# Patient Record
Sex: MALE | Race: WHITE | Employment: OTHER | URBAN - NONMETROPOLITAN AREA
[De-identification: names, ages, dates, MRNs, and addresses within clinical notes are randomized per-mention and may not be internally consistent; named-entity substitution may affect disease eponyms.]

---

## 2017-12-05 LAB — PAIN MANAGEMENT DRUG PANEL INTERP, URINE: NORMAL

## 2018-08-01 ENCOUNTER — APPOINTMENT (OUTPATIENT)
Dept: MRI IMAGING | Age: 63
DRG: 518 | End: 2018-08-01
Payer: MEDICARE

## 2018-08-01 ENCOUNTER — HOSPITAL ENCOUNTER (INPATIENT)
Age: 63
LOS: 21 days | Discharge: NURSING, REHAB OR LONG-TERM CARE | DRG: 518 | End: 2018-08-22
Attending: EMERGENCY MEDICINE | Admitting: INTERNAL MEDICINE
Payer: MEDICARE

## 2018-08-01 ENCOUNTER — HOSPITAL ENCOUNTER (OUTPATIENT)
Dept: CT IMAGING | Age: 63
Discharge: HOME | End: 2018-08-01
Payer: MEDICARE

## 2018-08-01 ENCOUNTER — HOSPITAL ENCOUNTER (OUTPATIENT)
Dept: MRI IMAGING | Age: 63
Discharge: HOME | End: 2018-08-01
Payer: MEDICARE

## 2018-08-01 ENCOUNTER — HOSPITAL ENCOUNTER (OUTPATIENT)
Dept: NUCLEAR MEDICINE | Age: 63
Discharge: HOME | End: 2018-08-01
Payer: MEDICARE

## 2018-08-01 ENCOUNTER — APPOINTMENT (OUTPATIENT)
Dept: CT IMAGING | Age: 63
DRG: 518 | End: 2018-08-01
Payer: MEDICARE

## 2018-08-01 DIAGNOSIS — G95.20 CORD COMPRESSION (HCC): ICD-10-CM

## 2018-08-01 DIAGNOSIS — Z00.6 EXAMINATION FOR NORMAL COMPARISON FOR CLINICAL RESEARCH: ICD-10-CM

## 2018-08-01 DIAGNOSIS — G95.9 ACUTE MYELOPATHY (HCC): Primary | ICD-10-CM

## 2018-08-01 DIAGNOSIS — C61 PROSTATE CANCER, PRIMARY, WITH METASTASIS FROM PROSTATE TO OTHER SITE (HCC): ICD-10-CM

## 2018-08-01 LAB
ALBUMIN SERPL-MCNC: 4.1 G/DL (ref 3.5–5.1)
ALP BLD-CCNC: 205 U/L (ref 38–126)
ALT SERPL-CCNC: 31 U/L (ref 11–66)
ANION GAP SERPL CALCULATED.3IONS-SCNC: 14 MEQ/L (ref 8–16)
APTT: 29 SECONDS (ref 22–38)
AST SERPL-CCNC: 45 U/L (ref 5–40)
BASOPHILS # BLD: 0.9 %
BASOPHILS ABSOLUTE: 0.1 THOU/MM3 (ref 0–0.1)
BILIRUB SERPL-MCNC: 0.3 MG/DL (ref 0.3–1.2)
BUN BLDV-MCNC: 11 MG/DL (ref 7–22)
CALCIUM SERPL-MCNC: 9.4 MG/DL (ref 8.5–10.5)
CHLORIDE BLD-SCNC: 101 MEQ/L (ref 98–111)
CO2: 28 MEQ/L (ref 23–33)
CREAT SERPL-MCNC: 0.8 MG/DL (ref 0.4–1.2)
EOSINOPHIL # BLD: 3.5 %
EOSINOPHILS ABSOLUTE: 0.3 THOU/MM3 (ref 0–0.4)
ERYTHROCYTE [DISTWIDTH] IN BLOOD BY AUTOMATED COUNT: 13.4 % (ref 11.5–14.5)
ERYTHROCYTE [DISTWIDTH] IN BLOOD BY AUTOMATED COUNT: 46.5 FL (ref 35–45)
GFR SERPL CREATININE-BSD FRML MDRD: > 90 ML/MIN/1.73M2
GLUCOSE BLD-MCNC: 120 MG/DL (ref 70–108)
HCT VFR BLD CALC: 44.8 % (ref 42–52)
HEMOGLOBIN: 14.2 GM/DL (ref 14–18)
IMMATURE GRANS (ABS): 0.03 THOU/MM3 (ref 0–0.07)
IMMATURE GRANULOCYTES: 0.4 %
INR BLD: 0.96 (ref 0.85–1.13)
LYMPHOCYTES # BLD: 24.3 %
LYMPHOCYTES ABSOLUTE: 1.8 THOU/MM3 (ref 1–4.8)
MCH RBC QN AUTO: 29.9 PG (ref 26–33)
MCHC RBC AUTO-ENTMCNC: 31.7 GM/DL (ref 32.2–35.5)
MCV RBC AUTO: 94.3 FL (ref 80–94)
MONOCYTES # BLD: 8.8 %
MONOCYTES ABSOLUTE: 0.7 THOU/MM3 (ref 0.4–1.3)
NUCLEATED RED BLOOD CELLS: 0 /100 WBC
OSMOLALITY CALCULATION: 285.6 MOSMOL/KG (ref 275–300)
PLATELET # BLD: 298 THOU/MM3 (ref 130–400)
PMV BLD AUTO: 9.8 FL (ref 9.4–12.4)
POTASSIUM SERPL-SCNC: 3.9 MEQ/L (ref 3.5–5.2)
PROSTATE SPECIFIC ANTIGEN: 289.9 NG/ML (ref 0–1)
RBC # BLD: 4.75 MILL/MM3 (ref 4.7–6.1)
SEG NEUTROPHILS: 62.1 %
SEGMENTED NEUTROPHILS ABSOLUTE COUNT: 4.6 THOU/MM3 (ref 1.8–7.7)
SODIUM BLD-SCNC: 143 MEQ/L (ref 135–145)
TOTAL CK: 45 U/L (ref 55–170)
TOTAL PROTEIN: 7.5 G/DL (ref 6.1–8)
WBC # BLD: 7.4 THOU/MM3 (ref 4.8–10.8)

## 2018-08-01 PROCEDURE — 74178 CT ABD&PLV WO CNTR FLWD CNTR: CPT

## 2018-08-01 PROCEDURE — 3209999900 NM COMPARISON OF OUTSIDE FILMS

## 2018-08-01 PROCEDURE — 96375 TX/PRO/DX INJ NEW DRUG ADDON: CPT

## 2018-08-01 PROCEDURE — 3209999900 MRI COMPARISON OF OUTSIDE FILMS

## 2018-08-01 PROCEDURE — 36415 COLL VENOUS BLD VENIPUNCTURE: CPT

## 2018-08-01 PROCEDURE — 70553 MRI BRAIN STEM W/O & W/DYE: CPT

## 2018-08-01 PROCEDURE — 82550 ASSAY OF CK (CPK): CPT

## 2018-08-01 PROCEDURE — 6360000002 HC RX W HCPCS

## 2018-08-01 PROCEDURE — 2060000000 HC ICU INTERMEDIATE R&B

## 2018-08-01 PROCEDURE — 3209999900 CT COMPARISON OF OUTSIDE FILMS

## 2018-08-01 PROCEDURE — 6370000000 HC RX 637 (ALT 250 FOR IP): Performed by: INTERNAL MEDICINE

## 2018-08-01 PROCEDURE — 85730 THROMBOPLASTIN TIME PARTIAL: CPT

## 2018-08-01 PROCEDURE — 85025 COMPLETE CBC W/AUTO DIFF WBC: CPT

## 2018-08-01 PROCEDURE — 84484 ASSAY OF TROPONIN QUANT: CPT

## 2018-08-01 PROCEDURE — 72158 MRI LUMBAR SPINE W/O & W/DYE: CPT

## 2018-08-01 PROCEDURE — 71270 CT THORAX DX C-/C+: CPT

## 2018-08-01 PROCEDURE — 99231 SBSQ HOSP IP/OBS SF/LOW 25: CPT | Performed by: NEUROLOGICAL SURGERY

## 2018-08-01 PROCEDURE — 6360000004 HC RX CONTRAST MEDICATION: Performed by: EMERGENCY MEDICINE

## 2018-08-01 PROCEDURE — 84153 ASSAY OF PSA TOTAL: CPT

## 2018-08-01 PROCEDURE — 72156 MRI NECK SPINE W/O & W/DYE: CPT

## 2018-08-01 PROCEDURE — 72157 MRI CHEST SPINE W/O & W/DYE: CPT

## 2018-08-01 PROCEDURE — A6250 SKIN SEAL PROTECT MOISTURIZR: HCPCS

## 2018-08-01 PROCEDURE — 6360000004 HC RX CONTRAST MEDICATION: Performed by: INTERNAL MEDICINE

## 2018-08-01 PROCEDURE — 2709999900 HC NON-CHARGEABLE SUPPLY

## 2018-08-01 PROCEDURE — 96374 THER/PROPH/DIAG INJ IV PUSH: CPT

## 2018-08-01 PROCEDURE — 2580000003 HC RX 258: Performed by: NEUROLOGICAL SURGERY

## 2018-08-01 PROCEDURE — 99285 EMERGENCY DEPT VISIT HI MDM: CPT

## 2018-08-01 PROCEDURE — 6360000002 HC RX W HCPCS: Performed by: NEUROLOGICAL SURGERY

## 2018-08-01 PROCEDURE — 85610 PROTHROMBIN TIME: CPT

## 2018-08-01 PROCEDURE — 6360000002 HC RX W HCPCS: Performed by: EMERGENCY MEDICINE

## 2018-08-01 PROCEDURE — 80053 COMPREHEN METABOLIC PANEL: CPT

## 2018-08-01 PROCEDURE — A9579 GAD-BASE MR CONTRAST NOS,1ML: HCPCS | Performed by: EMERGENCY MEDICINE

## 2018-08-01 RX ORDER — DULOXETIN HYDROCHLORIDE 20 MG/1
60 CAPSULE, DELAYED RELEASE ORAL DAILY
Status: ON HOLD | COMMUNITY
End: 2018-08-07

## 2018-08-01 RX ORDER — DEXAMETHASONE SODIUM PHOSPHATE 4 MG/ML
10 INJECTION, SOLUTION INTRA-ARTICULAR; INTRALESIONAL; INTRAMUSCULAR; INTRAVENOUS; SOFT TISSUE EVERY 6 HOURS
Status: DISCONTINUED | OUTPATIENT
Start: 2018-08-02 | End: 2018-08-04

## 2018-08-01 RX ORDER — OXYCODONE HYDROCHLORIDE AND ACETAMINOPHEN 5; 325 MG/1; MG/1
1-2 TABLET ORAL EVERY 8 HOURS PRN
Status: ON HOLD | COMMUNITY
End: 2018-08-21 | Stop reason: HOSPADM

## 2018-08-01 RX ORDER — DEXAMETHASONE SODIUM PHOSPHATE 4 MG/ML
10 INJECTION, SOLUTION INTRA-ARTICULAR; INTRALESIONAL; INTRAMUSCULAR; INTRAVENOUS; SOFT TISSUE ONCE
Status: COMPLETED | OUTPATIENT
Start: 2018-08-01 | End: 2018-08-01

## 2018-08-01 RX ORDER — HYDROXYZINE PAMOATE 25 MG/1
25 CAPSULE ORAL NIGHTLY
Status: ON HOLD | COMMUNITY
End: 2018-08-21 | Stop reason: HOSPADM

## 2018-08-01 RX ORDER — GABAPENTIN 600 MG/1
1200 TABLET ORAL 2 TIMES DAILY
Status: ON HOLD | COMMUNITY
End: 2018-08-21 | Stop reason: HOSPADM

## 2018-08-01 RX ORDER — HYDROCODONE BITARTRATE AND ACETAMINOPHEN 10; 325 MG/1; MG/1
1 TABLET ORAL EVERY 6 HOURS PRN
Status: DISCONTINUED | OUTPATIENT
Start: 2018-08-01 | End: 2018-08-16

## 2018-08-01 RX ORDER — MORPHINE SULFATE 2 MG/ML
2 INJECTION, SOLUTION INTRAMUSCULAR; INTRAVENOUS ONCE
Status: COMPLETED | OUTPATIENT
Start: 2018-08-01 | End: 2018-08-01

## 2018-08-01 RX ORDER — FLUTICASONE FUROATE AND VILANTEROL 200; 25 UG/1; UG/1
1 POWDER RESPIRATORY (INHALATION) DAILY
Status: DISCONTINUED | OUTPATIENT
Start: 2018-08-02 | End: 2018-08-18 | Stop reason: ALTCHOICE

## 2018-08-01 RX ORDER — METHYLPREDNISOLONE SODIUM SUCCINATE 125 MG/2ML
125 INJECTION, POWDER, LYOPHILIZED, FOR SOLUTION INTRAMUSCULAR; INTRAVENOUS ONCE
Status: COMPLETED | OUTPATIENT
Start: 2018-08-01 | End: 2018-08-01

## 2018-08-01 RX ORDER — PANTOPRAZOLE SODIUM 40 MG/1
40 TABLET, DELAYED RELEASE ORAL
Status: DISCONTINUED | OUTPATIENT
Start: 2018-08-02 | End: 2018-08-04

## 2018-08-01 RX ORDER — MORPHINE SULFATE 2 MG/ML
2 INJECTION, SOLUTION INTRAMUSCULAR; INTRAVENOUS EVERY 4 HOURS PRN
Status: DISCONTINUED | OUTPATIENT
Start: 2018-08-01 | End: 2018-08-04 | Stop reason: SDUPTHER

## 2018-08-01 RX ORDER — MORPHINE SULFATE 2 MG/ML
INJECTION, SOLUTION INTRAMUSCULAR; INTRAVENOUS
Status: COMPLETED
Start: 2018-08-01 | End: 2018-08-01

## 2018-08-01 RX ORDER — DULOXETIN HYDROCHLORIDE 20 MG/1
40 CAPSULE, DELAYED RELEASE ORAL DAILY
Status: DISCONTINUED | OUTPATIENT
Start: 2018-08-02 | End: 2018-08-02

## 2018-08-01 RX ORDER — LEVOTHYROXINE SODIUM 0.05 MG/1
50 TABLET ORAL DAILY
Status: DISCONTINUED | OUTPATIENT
Start: 2018-08-02 | End: 2018-08-18 | Stop reason: ALTCHOICE

## 2018-08-01 RX ORDER — ALBUTEROL SULFATE 2.5 MG/3ML
2.5 SOLUTION RESPIRATORY (INHALATION) 4 TIMES DAILY
Status: ON HOLD | COMMUNITY
End: 2018-08-21 | Stop reason: HOSPADM

## 2018-08-01 RX ORDER — GABAPENTIN 600 MG/1
600 TABLET ORAL 3 TIMES DAILY
Status: DISCONTINUED | OUTPATIENT
Start: 2018-08-01 | End: 2018-08-07

## 2018-08-01 RX ORDER — OMEPRAZOLE 20 MG/1
40 CAPSULE, DELAYED RELEASE ORAL DAILY
Status: ON HOLD | COMMUNITY
End: 2018-08-07

## 2018-08-01 RX ORDER — ROPINIROLE 0.5 MG/1
0.5 TABLET, FILM COATED ORAL 2 TIMES DAILY
Status: ON HOLD | COMMUNITY
End: 2018-08-07 | Stop reason: CLARIF

## 2018-08-01 RX ORDER — LEVOTHYROXINE SODIUM 0.05 MG/1
50 TABLET ORAL DAILY
Status: ON HOLD | COMMUNITY
End: 2018-08-21 | Stop reason: HOSPADM

## 2018-08-01 RX ORDER — HYDROCODONE BITARTRATE AND ACETAMINOPHEN 10; 325 MG/1; MG/1
1 TABLET ORAL EVERY 6 HOURS PRN
Status: ON HOLD | COMMUNITY
End: 2018-08-01 | Stop reason: CLARIF

## 2018-08-01 RX ORDER — FLUTICASONE FUROATE AND VILANTEROL 200; 25 UG/1; UG/1
1 POWDER RESPIRATORY (INHALATION) DAILY
Status: ON HOLD | COMMUNITY
End: 2018-08-21 | Stop reason: HOSPADM

## 2018-08-01 RX ORDER — INSULIN GLARGINE 100 [IU]/ML
10 INJECTION, SOLUTION SUBCUTANEOUS DAILY
Status: DISCONTINUED | OUTPATIENT
Start: 2018-08-02 | End: 2018-08-04

## 2018-08-01 RX ADMIN — DEXAMETHASONE SODIUM PHOSPHATE 40 MG: 4 INJECTION, SOLUTION INTRA-ARTICULAR; INTRALESIONAL; INTRAMUSCULAR; INTRAVENOUS; SOFT TISSUE at 23:36

## 2018-08-01 RX ADMIN — DEXAMETHASONE SODIUM PHOSPHATE 10 MG: 4 INJECTION, SOLUTION INTRA-ARTICULAR; INTRALESIONAL; INTRAMUSCULAR; INTRAVENOUS; SOFT TISSUE at 18:37

## 2018-08-01 RX ADMIN — MORPHINE SULFATE 2 MG: 2 INJECTION, SOLUTION INTRAMUSCULAR; INTRAVENOUS at 18:36

## 2018-08-01 RX ADMIN — IOPAMIDOL 85 ML: 755 INJECTION, SOLUTION INTRAVENOUS at 21:24

## 2018-08-01 RX ADMIN — GADOTERIDOL 20 ML: 279.3 INJECTION, SOLUTION INTRAVENOUS at 21:30

## 2018-08-01 RX ADMIN — HYDROCODONE BITARTRATE AND ACETAMINOPHEN 1 TABLET: 10; 325 TABLET ORAL at 23:50

## 2018-08-01 RX ADMIN — METHYLPREDNISOLONE SODIUM SUCCINATE 125 MG: 125 INJECTION, POWDER, FOR SOLUTION INTRAMUSCULAR; INTRAVENOUS at 17:39

## 2018-08-01 ASSESSMENT — PAIN DESCRIPTION - DESCRIPTORS
DESCRIPTORS: SHARP
DESCRIPTORS: SHARP

## 2018-08-01 ASSESSMENT — ENCOUNTER SYMPTOMS
ABDOMINAL PAIN: 0
COUGH: 0
SHORTNESS OF BREATH: 0
NAUSEA: 0
WHEEZING: 0
BACK PAIN: 1
DIARRHEA: 0
VOMITING: 0

## 2018-08-01 ASSESSMENT — PAIN DESCRIPTION - ORIENTATION
ORIENTATION: MID
ORIENTATION: MID

## 2018-08-01 ASSESSMENT — PAIN SCALES - GENERAL
PAINLEVEL_OUTOF10: 8
PAINLEVEL_OUTOF10: 8
PAINLEVEL_OUTOF10: 7
PAINLEVEL_OUTOF10: 7

## 2018-08-01 ASSESSMENT — PAIN DESCRIPTION - FREQUENCY
FREQUENCY: CONTINUOUS
FREQUENCY: CONTINUOUS

## 2018-08-01 ASSESSMENT — PAIN DESCRIPTION - PAIN TYPE
TYPE: ACUTE PAIN
TYPE: ACUTE PAIN

## 2018-08-01 ASSESSMENT — PAIN DESCRIPTION - LOCATION
LOCATION: BACK
LOCATION: BACK

## 2018-08-01 NOTE — ED PROVIDER NOTES
Conjunctivae and EOM are normal.   Neck: Normal range of motion. Neck supple. Cardiovascular: Normal rate, regular rhythm, normal heart sounds, intact distal pulses and normal pulses. Exam reveals no gallop and no friction rub. No murmur heard. Pulmonary/Chest: Effort normal and breath sounds normal. No respiratory distress. He has no decreased breath sounds. He has no wheezes. He has no rhonchi. He has no rales. Abdominal: Soft. Bowel sounds are normal. He exhibits no distension. There is no tenderness. There is no rebound, no guarding and no CVA tenderness. Patient had decreased sensation in mid-abdomen corresponding to the T7 and T8    Genitourinary: Rectum normal.   Genitourinary Comments: Patient has decreased sensation in genitals    Musculoskeletal: Normal range of motion. He exhibits no edema. Patient has symmetric active muscle stress bilaterally of lower extremities with a muscle grade 2 (not able to against gravity). Patient had right sided saddle sensation intact but a decreased left sided saddle sensation. Neurological: He is alert and oriented to person, place, and time. He displays abnormal reflex. He exhibits abnormal muscle tone. Coordination normal.   Patient has symmetric active muscle stress bilaterally of lower extremities with a muscle grade of 1. Patient had right sided saddle sensation intact but a decreased left sided saddle sensation. Decreased sensation started from T7 dermatome at mid upper abdomen. Skin: Skin is warm and dry. No rash noted. He is not diaphoretic. Nursing note and vitals reviewed.         DIFFERENTIAL DIAGNOSIS:   Metastatic prostate cancer, acute myelopathy, paraplegia, acute pathology     DIAGNOSTIC RESULTS     EKG: All EKG's are interpreted by the Emergency Department Physician who either signs or Co-signs this chart in the absence of a cardiologist.  None    RADIOLOGY: non-plain film images(s) such as CT, Ultrasound and MRI are read by the radiologist.  Nm Comparison Of Outside Films    Result Date: 8/1/2018  Radiology exam is complete. No Radiologist dictation. Please follow up with ordering provider. Nm Comparison Of Outside Films    Result Date: 8/1/2018  Radiology exam is complete. No Radiologist dictation. Please follow up with ordering provider. Ct Comparison Of Outside Films    Result Date: 8/1/2018  Radiology exam is complete. No Radiologist dictation. Please follow up with ordering provider. Ct Comparison Of Outside Films    Result Date: 8/1/2018  Radiology exam is complete. No Radiologist dictation. Please follow up with ordering provider.            CT ABDOMEN PELVIS W WO CONTRAST Additional Contrast? Radiologist Recommendation    (Results Pending)   CT CHEST W WO CONTRAST    (Results Pending)   MRI BRAIN W WO CONTRAST    (Results Pending)   MRI CERVICAL SPINE W WO CONTRAST    (Results Pending)   MRI LUMBAR SPINE W WO CONTRAST    (Results Pending)   MRI THORACIC SPINE W WO CONTRAST    (Results Pending)     [] Visualized and interpreted by me   [] Radiologist's Wet Read Report Reviewed   [] Discussed with Radiologist.    Leigha Lozano:   Results for orders placed or performed during the hospital encounter of 08/01/18   CBC Auto Differential   Result Value Ref Range    WBC 7.4 4.8 - 10.8 thou/mm3    RBC 4.75 4.70 - 6.10 mill/mm3    Hemoglobin 14.2 14.0 - 18.0 gm/dl    Hematocrit 44.8 42.0 - 52.0 %    MCV 94.3 (H) 80.0 - 94.0 fL    MCH 29.9 26.0 - 33.0 pg    MCHC 31.7 (L) 32.2 - 35.5 gm/dl    RDW-CV 13.4 11.5 - 14.5 %    RDW-SD 46.5 (H) 35.0 - 45.0 fL    Platelets 144 210 - 565 thou/mm3    MPV 9.8 9.4 - 12.4 fL    Seg Neutrophils 62.1 %    Lymphocytes 24.3 %    Monocytes 8.8 %    Eosinophils 3.5 %    Basophils 0.9 %    Immature Granulocytes 0.4 %    Segs Absolute 4.6 1.8 - 7.7 thou/mm3    Lymphocytes # 1.8 1.0 - 4.8 thou/mm3    Monocytes # 0.7 0.4 - 1.3 thou/mm3    Eosinophils # 0.3 0.0 - 0.4 thou/mm3    Basophils # 0.1 0.0 - 0.1 thou/mm3

## 2018-08-02 ENCOUNTER — APPOINTMENT (OUTPATIENT)
Dept: GENERAL RADIOLOGY | Age: 63
DRG: 518 | End: 2018-08-02
Payer: MEDICARE

## 2018-08-02 ENCOUNTER — ANESTHESIA (OUTPATIENT)
Dept: OPERATING ROOM | Age: 63
DRG: 518 | End: 2018-08-02
Payer: MEDICARE

## 2018-08-02 ENCOUNTER — APPOINTMENT (OUTPATIENT)
Dept: CT IMAGING | Age: 63
DRG: 518 | End: 2018-08-02
Payer: MEDICARE

## 2018-08-02 ENCOUNTER — ANESTHESIA EVENT (OUTPATIENT)
Dept: OPERATING ROOM | Age: 63
DRG: 518 | End: 2018-08-02
Payer: MEDICARE

## 2018-08-02 LAB
ABO: NORMAL
ALBUMIN SERPL-MCNC: 4 G/DL (ref 3.5–5.1)
ALP BLD-CCNC: 197 U/L (ref 38–126)
ALT SERPL-CCNC: 27 U/L (ref 11–66)
ANION GAP SERPL CALCULATED.3IONS-SCNC: 12 MEQ/L (ref 8–16)
ANTIBODY SCREEN: NORMAL
AST SERPL-CCNC: 33 U/L (ref 5–40)
BASOPHILS # BLD: 0.1 %
BASOPHILS ABSOLUTE: 0 THOU/MM3 (ref 0–0.1)
BILIRUB SERPL-MCNC: 0.4 MG/DL (ref 0.3–1.2)
BUN BLDV-MCNC: 12 MG/DL (ref 7–22)
CALCIUM SERPL-MCNC: 9.7 MG/DL (ref 8.5–10.5)
CHLORIDE BLD-SCNC: 102 MEQ/L (ref 98–111)
CHOLESTEROL, TOTAL: 219 MG/DL (ref 100–199)
CO2: 25 MEQ/L (ref 23–33)
CREAT SERPL-MCNC: 0.6 MG/DL (ref 0.4–1.2)
EKG ATRIAL RATE: 94 BPM
EKG P AXIS: 35 DEGREES
EKG P-R INTERVAL: 120 MS
EKG Q-T INTERVAL: 364 MS
EKG QRS DURATION: 92 MS
EKG QTC CALCULATION (BAZETT): 455 MS
EKG R AXIS: 34 DEGREES
EKG T AXIS: 55 DEGREES
EKG VENTRICULAR RATE: 94 BPM
EOSINOPHIL # BLD: 0 %
EOSINOPHILS ABSOLUTE: 0 THOU/MM3 (ref 0–0.4)
ERYTHROCYTE [DISTWIDTH] IN BLOOD BY AUTOMATED COUNT: 13.4 % (ref 11.5–14.5)
ERYTHROCYTE [DISTWIDTH] IN BLOOD BY AUTOMATED COUNT: 45.8 FL (ref 35–45)
GFR SERPL CREATININE-BSD FRML MDRD: > 90 ML/MIN/1.73M2
GLUCOSE BLD-MCNC: 139 MG/DL (ref 70–108)
GLUCOSE BLD-MCNC: 163 MG/DL (ref 70–108)
GLUCOSE BLD-MCNC: 171 MG/DL (ref 70–108)
GLUCOSE BLD-MCNC: 179 MG/DL (ref 70–108)
GLUCOSE BLD-MCNC: 200 MG/DL (ref 70–108)
HCT VFR BLD CALC: 45 % (ref 42–52)
HDLC SERPL-MCNC: 61 MG/DL
HEMOGLOBIN: 14.7 GM/DL (ref 14–18)
IMMATURE GRANS (ABS): 0.08 THOU/MM3 (ref 0–0.07)
IMMATURE GRANULOCYTES: 0.7 %
LDL CHOLESTEROL CALCULATED: 144 MG/DL
LYMPHOCYTES # BLD: 8.2 %
LYMPHOCYTES ABSOLUTE: 1 THOU/MM3 (ref 1–4.8)
MCH RBC QN AUTO: 30.1 PG (ref 26–33)
MCHC RBC AUTO-ENTMCNC: 32.7 GM/DL (ref 32.2–35.5)
MCV RBC AUTO: 92.2 FL (ref 80–94)
MONOCYTES # BLD: 0.5 %
MONOCYTES ABSOLUTE: 0.1 THOU/MM3 (ref 0.4–1.3)
NUCLEATED RED BLOOD CELLS: 0 /100 WBC
OSMOLALITY CALCULATION: 281.8 MOSMOL/KG (ref 275–300)
PLATELET # BLD: 309 THOU/MM3 (ref 130–400)
PMV BLD AUTO: 10 FL (ref 9.4–12.4)
POTASSIUM SERPL-SCNC: 4.2 MEQ/L (ref 3.5–5.2)
RBC # BLD: 4.88 MILL/MM3 (ref 4.7–6.1)
RH FACTOR: NORMAL
SEG NEUTROPHILS: 90.5 %
SEGMENTED NEUTROPHILS ABSOLUTE COUNT: 10.8 THOU/MM3 (ref 1.8–7.7)
SODIUM BLD-SCNC: 139 MEQ/L (ref 135–145)
TOTAL CK: 39 U/L (ref 55–170)
TOTAL CK: 41 U/L (ref 55–170)
TOTAL PROTEIN: 7.4 G/DL (ref 6.1–8)
TRIGL SERPL-MCNC: 71 MG/DL (ref 0–199)
TROPONIN T: < 0.01 NG/ML
TROPONIN T: < 0.01 NG/ML
TSH SERPL DL<=0.05 MIU/L-ACNC: 0.83 UIU/ML (ref 0.4–4.2)
WBC # BLD: 11.9 THOU/MM3 (ref 4.8–10.8)

## 2018-08-02 PROCEDURE — 72125 CT NECK SPINE W/O DYE: CPT

## 2018-08-02 PROCEDURE — 93005 ELECTROCARDIOGRAM TRACING: CPT | Performed by: INTERNAL MEDICINE

## 2018-08-02 PROCEDURE — 2709999900 HC NON-CHARGEABLE SUPPLY

## 2018-08-02 PROCEDURE — 2709999900 HC NON-CHARGEABLE SUPPLY: Performed by: NEUROLOGICAL SURGERY

## 2018-08-02 PROCEDURE — 86923 COMPATIBILITY TEST ELECTRIC: CPT

## 2018-08-02 PROCEDURE — 36415 COLL VENOUS BLD VENIPUNCTURE: CPT

## 2018-08-02 PROCEDURE — 7100000001 HC PACU RECOVERY - ADDTL 15 MIN: Performed by: NEUROLOGICAL SURGERY

## 2018-08-02 PROCEDURE — 72128 CT CHEST SPINE W/O DYE: CPT

## 2018-08-02 PROCEDURE — 95940 IONM IN OPERATNG ROOM 15 MIN: CPT | Performed by: NEUROLOGICAL SURGERY

## 2018-08-02 PROCEDURE — 84443 ASSAY THYROID STIM HORMONE: CPT

## 2018-08-02 PROCEDURE — 2060000000 HC ICU INTERMEDIATE R&B

## 2018-08-02 PROCEDURE — 85025 COMPLETE CBC W/AUTO DIFF WBC: CPT

## 2018-08-02 PROCEDURE — 2780000010 HC IMPLANT OTHER: Performed by: NEUROLOGICAL SURGERY

## 2018-08-02 PROCEDURE — 86900 BLOOD TYPING SEROLOGIC ABO: CPT

## 2018-08-02 PROCEDURE — 82948 REAGENT STRIP/BLOOD GLUCOSE: CPT

## 2018-08-02 PROCEDURE — 6370000000 HC RX 637 (ALT 250 FOR IP): Performed by: INTERNAL MEDICINE

## 2018-08-02 PROCEDURE — 6360000002 HC RX W HCPCS: Performed by: INTERNAL MEDICINE

## 2018-08-02 PROCEDURE — 6370000000 HC RX 637 (ALT 250 FOR IP): Performed by: PHYSICIAN ASSISTANT

## 2018-08-02 PROCEDURE — P9016 RBC LEUKOCYTES REDUCED: HCPCS

## 2018-08-02 PROCEDURE — 3600000015 HC SURGERY LEVEL 5 ADDTL 15MIN: Performed by: NEUROLOGICAL SURGERY

## 2018-08-02 PROCEDURE — 82550 ASSAY OF CK (CPK): CPT

## 2018-08-02 PROCEDURE — C1713 ANCHOR/SCREW BN/BN,TIS/BN: HCPCS | Performed by: NEUROLOGICAL SURGERY

## 2018-08-02 PROCEDURE — 84484 ASSAY OF TROPONIN QUANT: CPT

## 2018-08-02 PROCEDURE — 00B20ZZ EXCISION OF DURA MATER, OPEN APPROACH: ICD-10-PCS | Performed by: NEUROLOGICAL SURGERY

## 2018-08-02 PROCEDURE — 7100000000 HC PACU RECOVERY - FIRST 15 MIN: Performed by: NEUROLOGICAL SURGERY

## 2018-08-02 PROCEDURE — 00NX0ZZ RELEASE THORACIC SPINAL CORD, OPEN APPROACH: ICD-10-PCS | Performed by: NEUROLOGICAL SURGERY

## 2018-08-02 PROCEDURE — 3700000001 HC ADD 15 MINUTES (ANESTHESIA): Performed by: NEUROLOGICAL SURGERY

## 2018-08-02 PROCEDURE — 86901 BLOOD TYPING SEROLOGIC RH(D): CPT

## 2018-08-02 PROCEDURE — 0RB90ZZ EXCISION OF THORACIC VERTEBRAL DISC, OPEN APPROACH: ICD-10-PCS | Performed by: NEUROLOGICAL SURGERY

## 2018-08-02 PROCEDURE — 80053 COMPREHEN METABOLIC PANEL: CPT

## 2018-08-02 PROCEDURE — 86850 RBC ANTIBODY SCREEN: CPT

## 2018-08-02 PROCEDURE — 3600000005 HC SURGERY LEVEL 5 BASE: Performed by: NEUROLOGICAL SURGERY

## 2018-08-02 PROCEDURE — 99222 1ST HOSP IP/OBS MODERATE 55: CPT | Performed by: PHYSICIAN ASSISTANT

## 2018-08-02 PROCEDURE — 2720000010 HC SURG SUPPLY STERILE: Performed by: NEUROLOGICAL SURGERY

## 2018-08-02 PROCEDURE — 3700000000 HC ANESTHESIA ATTENDED CARE: Performed by: NEUROLOGICAL SURGERY

## 2018-08-02 PROCEDURE — C9362 IMPLNT,BON VOID FILLER-STRIP: HCPCS | Performed by: NEUROLOGICAL SURGERY

## 2018-08-02 PROCEDURE — 72131 CT LUMBAR SPINE W/O DYE: CPT

## 2018-08-02 PROCEDURE — 80061 LIPID PANEL: CPT

## 2018-08-02 PROCEDURE — 6360000002 HC RX W HCPCS: Performed by: ANESTHESIOLOGY

## 2018-08-02 PROCEDURE — 0RHA04Z INSERTION OF INTERNAL FIXATION DEVICE INTO THORACOLUMBAR VERTEBRAL JOINT, OPEN APPROACH: ICD-10-PCS | Performed by: NEUROLOGICAL SURGERY

## 2018-08-02 PROCEDURE — 5A1945Z RESPIRATORY VENTILATION, 24-96 CONSECUTIVE HOURS: ICD-10-PCS | Performed by: INTERNAL MEDICINE

## 2018-08-02 DEVICE — TI ALLOY ROD
Type: IMPLANTABLE DEVICE | Status: FUNCTIONAL
Brand: XIA 3

## 2018-08-02 DEVICE — POLYAXIAL SCREW
Type: IMPLANTABLE DEVICE | Status: FUNCTIONAL
Brand: XIA 3 SYSTEM - SERRATO

## 2018-08-02 DEVICE — FLOSEAL HEMOSTATIC MATRIX, 5 ML
Type: IMPLANTABLE DEVICE | Status: FUNCTIONAL
Brand: FLOSEAL

## 2018-08-02 DEVICE — IMPLANTABLE DEVICE
Type: IMPLANTABLE DEVICE | Site: SPINE THORACIC | Status: FUNCTIONAL
Brand: RINGLOC + HIP SYSTEM

## 2018-08-02 DEVICE — POLYAXIAL SCREW
Type: IMPLANTABLE DEVICE | Status: FUNCTIONAL
Brand: XIA 3

## 2018-08-02 DEVICE — SEALANT HEMSTAT 5ML HUM FIBRIN THROM 2 VI APPL DEV EVICEL: Type: IMPLANTABLE DEVICE | Site: SPINE THORACIC | Status: FUNCTIONAL

## 2018-08-02 DEVICE — FOAM STRIP
Type: IMPLANTABLE DEVICE | Site: SPINE THORACIC | Status: FUNCTIONAL
Brand: VITOSS BIMODAL

## 2018-08-02 DEVICE — 30-35 MM MULTI AXIAL CROSS CONNECTOR
Type: IMPLANTABLE DEVICE | Status: FUNCTIONAL
Brand: XIA 3

## 2018-08-02 DEVICE — BLOCKER
Type: IMPLANTABLE DEVICE | Status: FUNCTIONAL
Brand: XIA 3

## 2018-08-02 RX ORDER — HYDRALAZINE HYDROCHLORIDE 20 MG/ML
5 INJECTION INTRAMUSCULAR; INTRAVENOUS EVERY 10 MIN PRN
Status: DISCONTINUED | OUTPATIENT
Start: 2018-08-02 | End: 2018-08-03 | Stop reason: HOSPADM

## 2018-08-02 RX ORDER — DEXTROSE MONOHYDRATE 50 MG/ML
100 INJECTION, SOLUTION INTRAVENOUS PRN
Status: DISCONTINUED | OUTPATIENT
Start: 2018-08-02 | End: 2018-08-18 | Stop reason: ALTCHOICE

## 2018-08-02 RX ORDER — DULOXETIN HYDROCHLORIDE 60 MG/1
60 CAPSULE, DELAYED RELEASE ORAL DAILY
Status: DISCONTINUED | OUTPATIENT
Start: 2018-08-02 | End: 2018-08-18 | Stop reason: ALTCHOICE

## 2018-08-02 RX ORDER — DEXTROSE MONOHYDRATE 25 G/50ML
12.5 INJECTION, SOLUTION INTRAVENOUS PRN
Status: DISCONTINUED | OUTPATIENT
Start: 2018-08-02 | End: 2018-08-18 | Stop reason: ALTCHOICE

## 2018-08-02 RX ORDER — SODIUM CHLORIDE 9 MG/ML
INJECTION, SOLUTION INTRAVENOUS CONTINUOUS
Status: DISCONTINUED | OUTPATIENT
Start: 2018-08-02 | End: 2018-08-03

## 2018-08-02 RX ORDER — BICALUTAMIDE 50 MG/1
50 TABLET, FILM COATED ORAL DAILY
Status: DISCONTINUED | OUTPATIENT
Start: 2018-08-02 | End: 2018-08-18 | Stop reason: ALTCHOICE

## 2018-08-02 RX ORDER — NICOTINE POLACRILEX 4 MG
15 LOZENGE BUCCAL PRN
Status: DISCONTINUED | OUTPATIENT
Start: 2018-08-02 | End: 2018-08-18 | Stop reason: ALTCHOICE

## 2018-08-02 RX ORDER — ONDANSETRON 2 MG/ML
4 INJECTION INTRAMUSCULAR; INTRAVENOUS EVERY 6 HOURS PRN
Status: DISCONTINUED | OUTPATIENT
Start: 2018-08-02 | End: 2018-08-22 | Stop reason: HOSPADM

## 2018-08-02 RX ORDER — FENTANYL CITRATE 50 UG/ML
50 INJECTION, SOLUTION INTRAMUSCULAR; INTRAVENOUS EVERY 5 MIN PRN
Status: DISCONTINUED | OUTPATIENT
Start: 2018-08-02 | End: 2018-08-03 | Stop reason: HOSPADM

## 2018-08-02 RX ORDER — MORPHINE SULFATE 2 MG/ML
2 INJECTION, SOLUTION INTRAMUSCULAR; INTRAVENOUS EVERY 5 MIN PRN
Status: DISCONTINUED | OUTPATIENT
Start: 2018-08-02 | End: 2018-08-03 | Stop reason: HOSPADM

## 2018-08-02 RX ORDER — LABETALOL HYDROCHLORIDE 5 MG/ML
5 INJECTION, SOLUTION INTRAVENOUS EVERY 5 MIN PRN
Status: DISCONTINUED | OUTPATIENT
Start: 2018-08-02 | End: 2018-08-03 | Stop reason: HOSPADM

## 2018-08-02 RX ORDER — MEPERIDINE HYDROCHLORIDE 25 MG/ML
12.5 INJECTION INTRAMUSCULAR; INTRAVENOUS; SUBCUTANEOUS EVERY 5 MIN PRN
Status: DISCONTINUED | OUTPATIENT
Start: 2018-08-02 | End: 2018-08-03 | Stop reason: HOSPADM

## 2018-08-02 RX ORDER — ONDANSETRON 2 MG/ML
4 INJECTION INTRAMUSCULAR; INTRAVENOUS
Status: ACTIVE | OUTPATIENT
Start: 2018-08-02 | End: 2018-08-02

## 2018-08-02 RX ORDER — DIPHENHYDRAMINE HYDROCHLORIDE 50 MG/ML
12.5 INJECTION INTRAMUSCULAR; INTRAVENOUS
Status: ACTIVE | OUTPATIENT
Start: 2018-08-02 | End: 2018-08-02

## 2018-08-02 RX ADMIN — PHENYLEPHRINE HYDROCHLORIDE 200 MCG: 10 INJECTION INTRAVENOUS at 23:45

## 2018-08-02 RX ADMIN — Medication 140 MG: at 23:31

## 2018-08-02 RX ADMIN — GABAPENTIN 600 MG: 600 TABLET, FILM COATED ORAL at 00:56

## 2018-08-02 RX ADMIN — FENTANYL CITRATE 100 MCG: 50 INJECTION INTRAMUSCULAR; INTRAVENOUS at 23:31

## 2018-08-02 RX ADMIN — DEXAMETHASONE SODIUM PHOSPHATE 10 MG: 4 INJECTION, SOLUTION INTRA-ARTICULAR; INTRALESIONAL; INTRAMUSCULAR; INTRAVENOUS; SOFT TISSUE at 18:00

## 2018-08-02 RX ADMIN — PHENYLEPHRINE HYDROCHLORIDE 200 MCG: 10 INJECTION INTRAVENOUS at 23:55

## 2018-08-02 RX ADMIN — PHENYLEPHRINE HYDROCHLORIDE 200 MCG: 10 INJECTION INTRAVENOUS at 23:35

## 2018-08-02 RX ADMIN — DEXAMETHASONE SODIUM PHOSPHATE 10 MG: 4 INJECTION, SOLUTION INTRA-ARTICULAR; INTRALESIONAL; INTRAMUSCULAR; INTRAVENOUS; SOFT TISSUE at 06:38

## 2018-08-02 RX ADMIN — PROPOFOL 150 MG: 10 INJECTION, EMULSION INTRAVENOUS at 23:36

## 2018-08-02 RX ADMIN — GABAPENTIN 600 MG: 600 TABLET, FILM COATED ORAL at 14:02

## 2018-08-02 RX ADMIN — Medication 3 UNITS: at 00:45

## 2018-08-02 RX ADMIN — PANTOPRAZOLE SODIUM 40 MG: 40 TABLET, DELAYED RELEASE ORAL at 08:58

## 2018-08-02 RX ADMIN — INSULIN LISPRO 1 UNITS: 100 INJECTION, SOLUTION INTRAVENOUS; SUBCUTANEOUS at 16:20

## 2018-08-02 RX ADMIN — DEXAMETHASONE SODIUM PHOSPHATE 10 MG: 4 INJECTION, SOLUTION INTRA-ARTICULAR; INTRALESIONAL; INTRAMUSCULAR; INTRAVENOUS; SOFT TISSUE at 12:57

## 2018-08-02 RX ADMIN — HYDROCODONE BITARTRATE AND ACETAMINOPHEN 1 TABLET: 10; 325 TABLET ORAL at 14:02

## 2018-08-02 RX ADMIN — NYSTATIN 500000 UNITS: 100000 SUSPENSION ORAL at 12:55

## 2018-08-02 RX ADMIN — PROPOFOL 150 MCG/KG/MIN: 10 INJECTION, EMULSION INTRAVENOUS at 23:35

## 2018-08-02 RX ADMIN — EPHEDRINE SULFATE 30 MG: 50 INJECTION, SOLUTION INTRAVENOUS at 23:45

## 2018-08-02 RX ADMIN — LEVOTHYROXINE SODIUM 50 MCG: 50 TABLET ORAL at 06:37

## 2018-08-02 RX ADMIN — GABAPENTIN 600 MG: 600 TABLET, FILM COATED ORAL at 08:56

## 2018-08-02 RX ADMIN — MORPHINE SULFATE 2 MG: 2 INJECTION, SOLUTION INTRAMUSCULAR; INTRAVENOUS at 11:02

## 2018-08-02 RX ADMIN — DULOXETINE HYDROCHLORIDE 60 MG: 60 CAPSULE, DELAYED RELEASE ORAL at 08:56

## 2018-08-02 RX ADMIN — HYDROCODONE BITARTRATE AND ACETAMINOPHEN 1 TABLET: 10; 325 TABLET ORAL at 06:39

## 2018-08-02 RX ADMIN — FENTANYL CITRATE 50 MCG: 50 INJECTION INTRAMUSCULAR; INTRAVENOUS at 22:40

## 2018-08-02 RX ADMIN — NYSTATIN 500000 UNITS: 100000 SUSPENSION ORAL at 16:15

## 2018-08-02 RX ADMIN — MORPHINE SULFATE 2 MG: 2 INJECTION, SOLUTION INTRAMUSCULAR; INTRAVENOUS at 18:04

## 2018-08-02 RX ADMIN — MIDAZOLAM HYDROCHLORIDE 2 MG: 1 INJECTION, SOLUTION INTRAMUSCULAR; INTRAVENOUS at 22:40

## 2018-08-02 RX ADMIN — PANTOPRAZOLE SODIUM 40 MG: 40 TABLET, DELAYED RELEASE ORAL at 16:15

## 2018-08-02 RX ADMIN — SODIUM CHLORIDE: 9 INJECTION, SOLUTION INTRAVENOUS at 23:20

## 2018-08-02 RX ADMIN — MORPHINE SULFATE 2 MG: 2 INJECTION, SOLUTION INTRAMUSCULAR; INTRAVENOUS at 01:52

## 2018-08-02 ASSESSMENT — PAIN DESCRIPTION - LOCATION
LOCATION: BACK

## 2018-08-02 ASSESSMENT — PULMONARY FUNCTION TESTS
PIF_VALUE: 27
PIF_VALUE: 33
PIF_VALUE: 26
PIF_VALUE: 17
PIF_VALUE: 26
PIF_VALUE: 22
PIF_VALUE: 5
PIF_VALUE: 25
PIF_VALUE: 25
PIF_VALUE: 26
PIF_VALUE: 32
PIF_VALUE: 24
PIF_VALUE: 28
PIF_VALUE: 22
PIF_VALUE: 26
PIF_VALUE: 22
PIF_VALUE: 22
PIF_VALUE: 27
PIF_VALUE: 29
PIF_VALUE: 22
PIF_VALUE: 33
PIF_VALUE: 28
PIF_VALUE: 33
PIF_VALUE: 23
PIF_VALUE: 22
PIF_VALUE: 25
PIF_VALUE: 23
PIF_VALUE: 22
PIF_VALUE: 27

## 2018-08-02 ASSESSMENT — PAIN DESCRIPTION - FREQUENCY
FREQUENCY: CONTINUOUS

## 2018-08-02 ASSESSMENT — PAIN SCALES - GENERAL
PAINLEVEL_OUTOF10: 8
PAINLEVEL_OUTOF10: 6
PAINLEVEL_OUTOF10: 7
PAINLEVEL_OUTOF10: 7
PAINLEVEL_OUTOF10: 5
PAINLEVEL_OUTOF10: 6
PAINLEVEL_OUTOF10: 5
PAINLEVEL_OUTOF10: 3
PAINLEVEL_OUTOF10: 7
PAINLEVEL_OUTOF10: 7
PAINLEVEL_OUTOF10: 9

## 2018-08-02 ASSESSMENT — PAIN DESCRIPTION - ORIENTATION
ORIENTATION: MID

## 2018-08-02 ASSESSMENT — PAIN DESCRIPTION - DESCRIPTORS
DESCRIPTORS: SHARP

## 2018-08-02 ASSESSMENT — PAIN DESCRIPTION - PAIN TYPE
TYPE: ACUTE PAIN

## 2018-08-02 NOTE — CONSULTS
[facility]    Patient Sandip Mcmahon   MRN -  [id number]   [facility]   - 1955      Date of Admission -  2018  5:04 PM  Date of evaluation -  2018  Room - 4A--A   Hospital Day - 8235 Rosenda MEADOWS MD Primary Care Physician - No primary care provider on file. Reason for Consult    Prostate Cancer   Acute cord compression   Active Hospital Problem List      Active Hospital Problems    Diagnosis Date Noted    Cord compression [facility]) [G95.20] 2018     TY Butler is a 61 y.o. male admitted for severe back pain, acute cord compression. The patient has a history of prostate cancer with prostatectomy completed in  by urology in [place], [place]. He developed back pain several months ago that had been followed and work-up completed by his PCP revealed spinal metastasis. He was referred to Radiation Oncology. The patient was sent to the ED  on 18 from Radiation Oncology, Dr. Vanesa Cedillo, for concern for new leg weakness, severe back pain. He had MRI of the brain, cervical, thoracic and lumbar spine completed in the ED. He had findings of metastasis of the C7 vertebra, multiple thoracic vertebra and lumbar vertebra metastasis. He had diffuse replacement by metastasis of T8 with associated pathologic fracture with abnormal soft tissue density causing mass effect upon the thoracic spinal cord. The patient was admitted for further evaluation. Neurosurgery was consulted and the patient was started on high dose steroids. Oncology consult was requested to follow up on above. The patient is currently resting in bed, his wife and sister-in-law are at the bedside. The patient reports last evening he developed loss of sensation in bilateral lower extremities. He has been able to wiggle toes this am but is unable to perform this during examination.  The patient states last evening while having MRI completed he had urinary incontinence. He has been able to urinate on his own since. He has not had a bowel movement but is passing flatus. He denies bowel incontinence. The patient denies fever, chest pain, shortness of breath, abdominal pain, vomiting. He had one episode of nausea this am that resolved with medications. Patient reports in the last several months he intentionally lost 10 pounds. He affirms intermittent poor appetite. He had never been treated with hormone therapy.      Meds    Current Medications    DULoxetine  60 mg Oral Daily    insulin lispro  0-6 Units Subcutaneous TID WC    insulin lispro  0-3 Units Subcutaneous Nightly    Fluticasone Furoate-Vilanterol  1 applicator Inhalation Daily    gabapentin  600 mg Oral TID    levothyroxine  50 mcg Oral Daily    pantoprazole  40 mg Oral BID AC    insulin lispro  0-9 Units Subcutaneous Nightly    insulin glargine  10 Units Subcutaneous Daily    dexamethasone  10 mg Intravenous Q6H     ondansetron, glucose, dextrose, glucagon (rDNA), dextrose, HYDROcodone-acetaminophen, morphine  IV Drips/Infusions   dextrose      sodium chloride       Past Medical History         Diagnosis Date    Anxiety     Arthritis     Asthma     Cancer ([facility]     prostate    COPD (chronic obstructive pulmonary disease) ([facility]     Depression     Diabetes mellitus ([facility]     GERD (gastroesophageal reflux disease)     Neuropathy (HCC)     Pneumonia       Past Surgical History           Procedure Laterality Date    CARPAL TUNNEL RELEASE Bilateral     CERVICAL DISCECTOMY      COLONOSCOPY      ENDOSCOPY, COLON, DIAGNOSTIC      EYE SURGERY      HERNIA REPAIR      LUMBAR SPINE SURGERY      NASAL SEPTUM SURGERY      PROSTATECTOMY       Diet    Diet NPO Effective Now  Allergies    Crestor [rosuvastatin calcium] and Toradol [ketorolac tromethamine]  Social History     Social History     Social History    Marital status:      Spouse name: N/A    Number of children: N/A    fibrolinear scarring the left lower lobe seen. Calcified granuloma changes are present. There is no acute venous congestion or pneumothorax. Degenerative skeletal changes are present with sclerosis of  the mid lower thoracic vertebral bodies near the T8 level. Sclerotic appearance of C7 and portions of T10 and T11 are also noted correlation with metastatic changes the etiologies such as prostate or renal are There is no aneurysm present. The mediastinum is negative for pathologic adenopathy. There is no pneumothorax seen. 1. Minimal fibrolinear scarring lung bases. 2. Vertebral deformity at T8 with pathologic-appearing compression with superimposed sclerosis, very minimal narrowing of the central canal is not excluded. 3. Additional thoracic and lower cervical vertebra with sclerotic changes concerning for metastatic change to the skeleton. **This report has been created using voice recognition software. It may contain minor errors which are inherent in voice recognition technology. ** Final report electronically signed by Dr. Lula Frank on 8/1/2018 11:33 PM    Ct Cervical Spine Wo Contrast    Result Date: 8/2/2018  PROCEDURE: CT CERVICAL SPINE WO CONTRAST CLINICAL INFORMATION: METASTASES TO SPINE, prostate cancer r/o mets. Metastases. History of prostate cancer. Worsening back pain. COMPARISON: Cervical spine MRI 8/1/2018. TECHNIQUE: 3 mm noncontrast axial images were obtained through the cervical spine with sagittal and coronal reconstructions. All CT scans at this facility use dose modulation, iterative reconstruction, and/or weight-based dosing when appropriate to reduce radiation dose to as low as reasonably achievable. FINDINGS: There is a sclerotic metastasis in the inferior left aspect of C7. No other metastases are noted in the cervical spine. The patient has anterior hardware in the C3-4 level. There is a solid fusion across the discs at C4-C7. The cervical vertebral bodies are normally aligned. pathologic fracture of T8  - Neurosurgery following. Continue high dose dexamethasone, PPI. Will add prophylactic Nystatin. Follow neurosurgery plans. 3. Leukocytosis - WBC 11.9 on 8/2/18, likely steroid induced. Afebrile, no specific infectious symptoms. Other comorbidites: DM-2, hypothyroidism, obesity    Update:  Discussed with pharmacy, Lupron is unavailable as an inpatient. Continue with Casodex, Casodex will be started tomorrow on 8/3/18 as neurosurgery, Dr. Jazzmine Woods, planning surgical intervention today. Case discussed with nurse and patient/family. Questions and concerns addressed.   Plan made in collaboration with Dr. Bill Mcneil    Electronically signed by   Joshua Davis PA-C on 8/2/2018 at 10:33 AM

## 2018-08-02 NOTE — PROGRESS NOTES
Discharge instructions reviewed with patient and patient verbalizes understanding. Telemetry monitor and wires removed from patient and cleansed and placed in room. Patient discharged with personal belongings. Reviewed and educated patient on follow up appointment, current medications and new medications. LACP here to discharge patient.

## 2018-08-02 NOTE — PROGRESS NOTES
Dr. Stephanie Kearns in to see patient. Dr. Stephanie Kearns discussed with patient about having surgery and discussed with him about possible complications and risks that could occur with having surgery. Patient and family agreed to proceed with surgery. Plan is to take patient to surgery later this afternoon. Consent obtained. Patient had bath with chlorhexidine soap today.

## 2018-08-02 NOTE — PROGRESS NOTES
[facility]  Progress Note Dr Tramaine Montgomery covering for Dr Kingsley Ortiz           Patient:  Karna Simmonds  YOB: 1955  Date of Service: 8/2/2018  MRN: [id number]   Acct:  [de-identified]   Primary Care Physician: No primary care provider on file. 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 Home Medications:   No current facility-administered medications on file prior to encounter. No current outpatient prescriptions on file prior to encounter. Scheduled Meds:   DULoxetine  60 mg Oral Daily    Fluticasone Furoate-Vilanterol  1 applicator Inhalation Daily    gabapentin  600 mg Oral TID    levothyroxine  50 mcg Oral Daily    pantoprazole  40 mg Oral BID AC    insulin lispro  0-18 Units Subcutaneous TID WC    insulin lispro  0-9 Units Subcutaneous Nightly    insulin glargine  10 Units Subcutaneous Daily    dexamethasone  10 mg Intravenous Q6H     Continuous Infusions:  PRN Meds:ondansetron, HYDROcodone-acetaminophen, morphine        Allergies:  Crestor [rosuvastatin calcium] and Toradol [ketorolac tromethamine]    OBJECTIVE:    Vitals:   Vitals:    08/02/18 0834   BP: 119/64   Pulse: 92   Resp: 18   Temp: 98.1 °F (36.7 °C)   SpO2: 92%      BMI: Body mass index is 38.91 kg/m². PHYSICAL EXAMINATION:          HEENT:  Pupils are reacting to light. Tongue is moist.  Buccal mucosa is  moist.  NECK:  Thick and supple. HEART:  S1 and S2 heard with a regular rhythm. LUNGS:  Air exchange is diminished bilaterally. ABDOMEN:  Significantly distended, but bowel sounds are still appreciated  with no guarding or tenderness. EXTREMITIES:  No edema noted. Dorsalis pedis and posterior tibial pulses  +1. NEUROLOGIC:  He is oriented to person, place, and time. Hand  is equal  bilaterally. He can now move his feet and raise the right leg off the bed 3/5, left leg is 2/5 motor strength.   Review of Labs and Diagnostic Testing:    Recent Results (from the past 24 hour(s))   CBC Auto Differential    Collection Time: 08/01/18  5:27 PM   Result Value Ref Range    WBC 7.4 4.8 - 10.8 thou/mm3    RBC 4.75 4.70 - 6.10 mill/mm3    Hemoglobin 14.2 14.0 - 18.0 gm/dl    Hematocrit 44.8 42.0 - 52.0 %    MCV 94.3 (H) 80.0 - 94.0 fL    MCH 29.9 26.0 - 33.0 pg    MCHC 31.7 (L) 32.2 - 35.5 gm/dl    RDW-CV 13.4 11.5 - 14.5 %    RDW-SD 46.5 (H) 35.0 - 45.0 fL    Platelets 089 878 - 209 thou/mm3    MPV 9.8 9.4 - 12.4 fL    Seg Neutrophils 62.1 %    Lymphocytes 24.3 %    Monocytes 8.8 %    Eosinophils 3.5 %    Basophils 0.9 %    Immature Granulocytes 0.4 %    Segs Absolute 4.6 1.8 - 7.7 thou/mm3    Lymphocytes # 1.8 1.0 - 4.8 thou/mm3    Monocytes # 0.7 0.4 - 1.3 thou/mm3    Eosinophils # 0.3 0.0 - 0.4 thou/mm3    Basophils # 0.1 0.0 - 0.1 thou/mm3    Immature Grans (Abs) 0.03 0.00 - 0.07 thou/mm3    nRBC 0 /100 wbc   Comprehensive Metabolic Panel    Collection Time: 08/01/18  5:27 PM   Result Value Ref Range    Glucose 120 (H) 70 - 108 mg/dL    CREATININE 0.8 0.4 - 1.2 mg/dL    BUN 11 7 - 22 mg/dL    Sodium 143 135 - 145 meq/L    Potassium 3.9 3.5 - 5.2 meq/L    Chloride 101 98 - 111 meq/L    CO2 28 23 - 33 meq/L    Calcium 9.4 8.5 - 10.5 mg/dL    AST 45 (H) 5 - 40 U/L    Alkaline Phosphatase 205 (H) 38 - 126 U/L    Total Protein 7.5 6.1 - 8.0 g/dL    Alb 4.1 3.5 - 5.1 g/dL    Total Bilirubin 0.3 0.3 - 1.2 mg/dL    ALT 31 11 - 66 U/L   APTT    Collection Time: 08/01/18  5:27 PM   Result Value Ref Range    aPTT 29.0 22.0 - 38.0 seconds   Protime-INR    Collection Time: 08/01/18  5:27 PM   Result Value Ref Range    INR 0.96 0.85 - 1.13   Anion Gap    Collection Time: 08/01/18  5:27 PM   Result Value Ref Range    Anion Gap 14.0 8.0 - 16.0 meq/L   Glomerular Filtration Rate, Estimated Collection Time: 08/01/18  5:27 PM   Result Value Ref Range    Est, Glom Filt Rate >90 ml/min/1.73m2   Osmolality    Collection Time: 08/01/18  5:27 PM   Result Value Ref Range    Osmolality Calc 285.6 275.0 - 300 mOsmol/kg   CK    Collection Time: 08/01/18  5:27 PM   Result Value Ref Range    Total CK 45 (L) 55 - 170 U/L   PSA, prostatic specific antigen    Collection Time: 08/01/18  5:27 PM   Result Value Ref Range    .90 (H) 0.00 - 1.00 ng/mL   Troponin    Collection Time: 08/01/18 11:58 PM   Result Value Ref Range    Troponin T < 0.010 ng/ml   CK    Collection Time: 08/01/18 11:58 PM   Result Value Ref Range    Total CK 41 (L) 55 - 170 U/L   POCT Glucose    Collection Time: 08/02/18 12:03 AM   Result Value Ref Range    POC Glucose 200 (H) 70 - 108 mg/dl   Comprehensive Metabolic Panel    Collection Time: 08/02/18  7:05 AM   Result Value Ref Range    Glucose 179 (H) 70 - 108 mg/dL    CREATININE 0.6 0.4 - 1.2 mg/dL    BUN 12 7 - 22 mg/dL    Sodium 139 135 - 145 meq/L    Potassium 4.2 3.5 - 5.2 meq/L    Chloride 102 98 - 111 meq/L    CO2 25 23 - 33 meq/L    Calcium 9.7 8.5 - 10.5 mg/dL    AST 33 5 - 40 U/L    Alkaline Phosphatase 197 (H) 38 - 126 U/L    Total Protein 7.4 6.1 - 8.0 g/dL    Alb 4.0 3.5 - 5.1 g/dL    Total Bilirubin 0.4 0.3 - 1.2 mg/dL    ALT 27 11 - 66 U/L   CBC Auto Differential    Collection Time: 08/02/18  7:05 AM   Result Value Ref Range    WBC 11.9 (H) 4.8 - 10.8 thou/mm3    RBC 4.88 4.70 - 6.10 mill/mm3    Hemoglobin 14.7 14.0 - 18.0 gm/dl    Hematocrit 45.0 42.0 - 52.0 %    MCV 92.2 80.0 - 94.0 fL    MCH 30.1 26.0 - 33.0 pg    MCHC 32.7 32.2 - 35.5 gm/dl    RDW-CV 13.4 11.5 - 14.5 %    RDW-SD 45.8 (H) 35.0 - 45.0 fL    Platelets 072 534 - 090 thou/mm3    MPV 10.0 9.4 - 12.4 fL    Seg Neutrophils 90.5 %    Lymphocytes 8.2 %    Monocytes 0.5 %    Eosinophils 0.0 %    Basophils 0.1 %    Immature Granulocytes 0.7 %    Segs Absolute 10.8 (H) 1.8 - 7.7 thou/mm3    Lymphocytes # 1.0 1.0 - 4.8 spaces: C2-3: The disc space, central canal and foramen are grossly normal. C3-4: There is postsurgical changes of the C4 level. The disc space shows a 3 mm posterior disc bulge without acute central canal impingement. Foraminal stenosis is moderate severe bilaterally. C4-5, C5-6, C6-7: The disc spaces are fused limiting detail. The central canal remains patent to the cervical segment of surgery. At C4-5 there is bilateral right foramen narrowing. C5-6 mild bilateral foramen narrowing present. C6-7: The foramen are patent. C7-T1: The disc space, central canal and foramen are grossly normal. There is a dominant left vertebral arteries system. 1. Postsurgical changes of the mid lower cervical spine with anterior interbody fusion. 2. Multilevel degenerative changes described above at individual levels. **This report has been created using voice recognition software. It may contain minor errors which are inherent in voice recognition technology. ** Final report electronically signed by Dr. Reina Hilario on 8/2/2018 1:09 AM    Mri Thoracic Spine W Wo Contrast    Result Date: 8/2/2018  PROCEDURE: MRI THORACIC SPINE W WO CONTRAST CLINICAL INFORMATION: prostate cancer vertebral metastases. COMPARISON: No prior study. TECHNIQUE: Sagittal T1, T2 and STIR sequences were obtained through the thoracic spine. Axial T2-weighted images were obtained through the discs. Postcontrast axial and sagittal T1-weighted images were obtained. FINDINGS:  There is mid thoracic infiltrative changes of several vertebra with mild retropulsion of the dorsal endplate combining with pedicular infiltration producing encroachment upon the central canal these changes are seen near T11 T10 and minimally at T12. Cord signal is grossly intact with no acute high-grade impingement. The conus appears intact visualized near L1-2. Infiltrative changes of multiple thoracic vertebra consistent with metastatic change per history.  Mild central canal correlate with chronicity or acuity of recent surgery. 2. Skeletal marrow signal changes concerning for metastases at L2 and T12 partially visualized. 3. Degenerative changes of the lumbar spine segments described above at individual levels without acute high-grade impingement of the central canal structures **This report has been created using voice recognition software. It may contain minor errors which are inherent in voice recognition technology. ** Final report electronically signed by Dr. Fahad Estes on 8/2/2018 1:41 AM    Mri Brain W Wo Contrast    Result Date: 8/2/2018  PROCEDURE: MRI BRAIN W WO CONTRAST CLINICAL INFORMATIONprostate cancer vertebral metastases. COMPARISON: No prior study. TECHNIQUE: Multiplanar and multiple spin echo T1 and T2-weighted images were obtained through the brain before and after the administration of intravenous contrast. FINDINGS: The diffusion-weighted images are normal. The brain volume is compatible with age-related changes. There are no intra-or extra-axial collections. There is no hydrocephalus, midline shift or mass effect. On the FLAIR and T2-weighted sequences, there is normal signal intensity in the brain. On the gradient echo T2-weighted images, there is mineralization in the medial aspects of the basal ganglia. No other areas of susceptibility artifact are present. There is no abnormal enhancement in the brain. The major intracranial vascular flow voids are present. The midline craniocervical junction structures are normal.  The brainstem and pituitary gland are normal.     1. Generalized mild cerebral atrophy. No active pathology present. **This report has been created using voice recognition software. It may contain minor errors which are inherent in voice recognition technology. ** Final report electronically signed by Dr. Fahad Estes on 8/2/2018 12:15 AM    CT C-Spine:      Impression       1. Sclerotic metastasis in the inferior anterior aspect of C7. 2. Solid osseous fusion of C4-C7.            CT - THORACIC SPINE      Impression       1. Pathologic fracture of T8. This is pathologic secondary to underlying sclerotic metastasis. There is known extent in the posterior elements and spinal canal. This is better demonstrated on MR.       2. Additional sclerotic metastases are also present in T7 and T9. Additional metastases are present in the lower thoracic and lumbar lumbar bodies. The metastases are better seen on MRI.       CT LUMBAR SPINE :      Sclerotic metastases are present in T11, T12, L2, L4 and L5 vertebral bodies. No pathologic fractures are noted.         EKG NSR, Low voltage QRS, Non specific ST abnormality. ASSESMENT:      Active Problems:    Cord compression [facility])  Resolved Problems:    * No resolved hospital problems. *     IMPRESSION:  1. Acute spinal cord compression , with paraplegia  2. 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 3.  Insulin-requiring diabetes mellitus. 4.  Hypothyroidism. 5.  COPD  6. History of obstructive sleep apnea. 7.  Obesity. 8.  History of nicotine use.     RECOMMENDATIONS:  MRI and CAT scan findings as noted above. Neurosurgery &  Oncology services following . Functional capacity > 4 METS prior to presentation. Surgery planned for today. EKG as noted above. Patients surgery is emergent post op risk stratification. Continue on High-dose steroids    Monitor his chems with insulin coverage. Resume home medications as appropriate.   Pain control with both morphine and oral medications.         DVT prophylaxis: [] Lovenox                                 [x] SCDs                                 [] SQ Heparin                                 [] Encourage ambulation, low risk for DVT, no chemical or mechanical prophylaxis necessary              [] Already on Anticoagulation                Anticipated Disposition upon discharge: [] Home                                                                         [] Home with

## 2018-08-02 NOTE — H&P
[address] [place] [address]                               HISTORY AND PHYSICAL    PATIENT NAME: Marquise Harvey                 :        1955  MED REC NO:   [id number]                           ROOM:       0008  ACCOUNT NO:   [de-identified]                           ADMIT DATE: 2018  PROVIDER:     Nilam Fu M.D. Seen for the Hospitalist Service. CHIEF COMPLAINT:  Severe back pain, referred from Radiation Oncology for  acute evaluation. HISTORY OF PRESENT ILLNESS:  This is a 80-year-old male with past medical  history of prostate cancer which was initially diagnosed in , underwent  prostatectomy and was followed until . Per notes, apparently, he was  lost in followup from . He had also had back pain with back surgeries  and did have worsening of his back pain. Hence, an MRI in May was ordered,  which showed degenerative disc disease. The patient continued to have  significant pain and did have repeat CT in 2018, which showed some  sclerotic lesions that ended up doing a repeat MRI scan on , which did  show extensive vertebral body T7 triple-enhancing expansile mass lesion  causing mild canal stenosis and the lesions were extending into the  bilateral pedicles and also caused C7-C8 neuroforaminal stenosis with _____  compression of bilateral exiting T7 nerve roots and multiple enhancing  lesions involving T6, T8 through T12. The patient was referred to  Radiation Oncology for palliative symptomatic treatment. He also had a PSA  level that was 292. The patient was apparently referred from Radiation  Oncology office as he was noticed to have increased weakness in his lower  extremities, to the ER. I received a call from Dr. Sofie Link and I felt that  the patient will need to be referred to intensive care and also to call  Neurology and Neurosurgery immediately.   Dr. Antonio Anguiano was also here in the Neurology. In the  meantime, neurologist was also contacted over the phone by Dr. Marko Marques who  recommended steroids and surgical consult. Dr. Homa Valero was also called by  Dr. Marko Marques and informed about the patient. I was later informed that the  patient was not a candidate for an ICU admission and hence, I went ahead  and admitted the patient. Discussing with the patient, he was apparently  walking intil this afternoon, he woke up as usual today and walked to the bathroom. He also  went outside by himself. He went with his wife to Radiation  Oncology and there he did require assistance getting in and out of the car, but later  he had progressive weakness that he  required wheelchair assistance to get out of the car and into the ER. He has received Solu-Medrol earlier  and after discussion with Dr. Cecilia Sherman, he is  receiving a total of 50 mg IV Decadron. The patient now has no mobility at  all in his lower extremities, he is not even able to wiggle his toes. He  has no sensation in his feet to pinprick, to touch. He is able to feel  only above the nipple level. He was able to urinate after he got to the  ER. He did have a bowel movement today. His abdomen is still distended  though. There is no nausea or vomiting. No chest pain or palpitations. He has been diabetic for many years. He denies any complications from  diabetes. He did have renal issues in the past, but not now. His renal  issues were attributed to Toradol. He also is sensitive to Crestor. There  is no trauma. PAST MEDICAL HISTORY:  Significant for history of diabetes, history of  prostate cancer and status post prostatectomy, history of benign prostatic  hypertrophy, chronic back pain, asthma, COPD mentioned, but he denies any  history of hypothyroidism. Also with history of sleep apnea. PAST SURGICAL HISTORY:  He had back surgery twice. He had back surgery  before and radical prostatectomy.     ALLERGIES:  Listed

## 2018-08-03 ENCOUNTER — APPOINTMENT (OUTPATIENT)
Dept: GENERAL RADIOLOGY | Age: 63
DRG: 518 | End: 2018-08-03
Payer: MEDICARE

## 2018-08-03 VITALS
OXYGEN SATURATION: 100 % | DIASTOLIC BLOOD PRESSURE: 54 MMHG | RESPIRATION RATE: 2 BRPM | TEMPERATURE: 97.5 F | SYSTOLIC BLOOD PRESSURE: 98 MMHG

## 2018-08-03 LAB
ALLEN TEST: ABNORMAL
ANION GAP SERPL CALCULATED.3IONS-SCNC: 14 MEQ/L (ref 8–16)
APTT: 20.6 SECONDS (ref 22–38)
BASE EXCESS (CALCULATED): -2.4 MMOL/L (ref -2.5–2.5)
BASE EXCESS (CALCULATED): -2.9 MMOL/L (ref -2.5–2.5)
BASE EXCESS (CALCULATED): -6.1 MMOL/L (ref -2.5–2.5)
BASE EXCESS (CALCULATED): -6.7 MMOL/L (ref -2.5–2.5)
BASE EXCESS (CALCULATED): -7.3 MMOL/L (ref -2.5–2.5)
BASE EXCESS (CALCULATED): -7.9 MMOL/L (ref -2.5–2.5)
BASOPHILS # BLD: 0.1 %
BASOPHILS ABSOLUTE: 0 THOU/MM3 (ref 0–0.1)
BUN BLDV-MCNC: 23 MG/DL (ref 7–22)
CALCIUM IONIZED SERUM: 1.08 MMOL/L (ref 1.12–1.32)
CALCIUM IONIZED SERUM: 1.13 MMOL/L (ref 1.12–1.32)
CALCIUM IONIZED SERUM: 1.15 MMOL/L (ref 1.12–1.32)
CALCIUM IONIZED SERUM: 1.23 MMOL/L (ref 1.12–1.32)
CALCIUM IONIZED: 1.19 MMOL/L (ref 1.12–1.32)
CALCIUM SERPL-MCNC: 8 MG/DL (ref 8.5–10.5)
CHLORIDE BLD-SCNC: 108 MEQ/L (ref 98–111)
CO2: 18 MEQ/L (ref 23–33)
COLLECTED BY:: ABNORMAL
CREAT SERPL-MCNC: 1 MG/DL (ref 0.4–1.2)
DEVICE: ABNORMAL
DEVICE: ABNORMAL
EOSINOPHIL # BLD: 0 %
EOSINOPHILS ABSOLUTE: 0 THOU/MM3 (ref 0–0.4)
ERYTHROCYTE [DISTWIDTH] IN BLOOD BY AUTOMATED COUNT: 13.4 % (ref 11.5–14.5)
ERYTHROCYTE [DISTWIDTH] IN BLOOD BY AUTOMATED COUNT: 45.7 FL (ref 35–45)
GFR SERPL CREATININE-BSD FRML MDRD: 75 ML/MIN/1.73M2
GLUCOSE BLD-MCNC: 157 MG/DL (ref 70–108)
GLUCOSE BLD-MCNC: 182 MG/DL (ref 70–108)
GLUCOSE BLD-MCNC: 187 MG/DL (ref 70–108)
GLUCOSE BLD-MCNC: 198 MG/DL (ref 70–108)
GLUCOSE, WHOLE BLOOD: 212 MG/DL (ref 70–108)
GLUCOSE, WHOLE BLOOD: 215 MG/DL (ref 70–108)
GLUCOSE, WHOLE BLOOD: 219 MG/DL (ref 70–108)
GLUCOSE, WHOLE BLOOD: 232 MG/DL (ref 70–108)
GLUCOSE, WHOLE BLOOD: 234 MG/DL (ref 70–108)
HCO3: 17 MMOL/L (ref 23–28)
HCO3: 17 MMOL/L (ref 23–28)
HCO3: 21 MMOL/L (ref 23–28)
HCO3: 21 MMOL/L (ref 23–28)
HCO3: 22 MMOL/L (ref 23–28)
HCO3: 23 MMOL/L (ref 23–28)
HCT VFR BLD CALC: 37.1 % (ref 42–52)
HEMOGLOBIN FINGERSTICK, POC: 9 G/DL (ref 14–18)
HEMOGLOBIN FINGERSTICK, POC: 9.9 G/DL (ref 14–18)
HEMOGLOBIN: 12.1 GM/DL (ref 14–18)
IFIO2: 15
IFIO2: 50
IMMATURE GRANS (ABS): 0.15 THOU/MM3 (ref 0–0.07)
IMMATURE GRANULOCYTES: 0.8 %
INR BLD: 1.01 (ref 0.85–1.13)
LYMPHOCYTES # BLD: 4.8 %
LYMPHOCYTES ABSOLUTE: 0.9 THOU/MM3 (ref 1–4.8)
MCH RBC QN AUTO: 30.4 PG (ref 26–33)
MCHC RBC AUTO-ENTMCNC: 32.6 GM/DL (ref 32.2–35.5)
MCV RBC AUTO: 93.2 FL (ref 80–94)
MODE: AC
MONOCYTES # BLD: 5.5 %
MONOCYTES ABSOLUTE: 1.1 THOU/MM3 (ref 0.4–1.3)
MRSA SCREEN RT-PCR: NEGATIVE
NUCLEATED RED BLOOD CELLS: 0 /100 WBC
O2 SATURATION: 100 %
O2 SATURATION: 99 %
O2 SATURATION: 99 %
PCO2: 30 MMHG (ref 35–45)
PCO2: 31 MMHG (ref 35–45)
PCO2: 39 MMHG (ref 35–45)
PCO2: 43 MMHG (ref 35–45)
PCO2: 48 MMHG (ref 35–45)
PCO2: 51 MMHG (ref 35–45)
PH BLOOD GAS: 7.22 (ref 7.35–7.45)
PH BLOOD GAS: 7.25 (ref 7.35–7.45)
PH BLOOD GAS: 7.35 (ref 7.35–7.45)
PH BLOOD GAS: 7.35 (ref 7.35–7.45)
PH BLOOD GAS: 7.36 (ref 7.35–7.45)
PH BLOOD GAS: 7.37 (ref 7.35–7.45)
PLATELET # BLD: 309 THOU/MM3 (ref 130–400)
PMV BLD AUTO: 9.9 FL (ref 9.4–12.4)
PO2: 121 MMHG (ref 71–104)
PO2: 137 MMHG (ref 71–104)
PO2: 178 MMHG (ref 71–104)
PO2: 213 MMHG (ref 71–104)
PO2: 237 MMHG (ref 71–104)
PO2: 260 MMHG (ref 71–104)
POTASSIUM SERPL-SCNC: 4.6 MEQ/L (ref 3.5–5.2)
POTASSIUM, WHOLE BLOOD: 3.9 MEQ/L (ref 3.5–4.9)
POTASSIUM, WHOLE BLOOD: 4 MEQ/L (ref 3.5–4.9)
POTASSIUM, WHOLE BLOOD: 4.3 MEQ/L (ref 3.5–4.9)
POTASSIUM, WHOLE BLOOD: 4.4 MEQ/L (ref 3.5–4.9)
RBC # BLD: 3.98 MILL/MM3 (ref 4.7–6.1)
SEG NEUTROPHILS: 88.8 %
SEGMENTED NEUTROPHILS ABSOLUTE COUNT: 17.2 THOU/MM3 (ref 1.8–7.7)
SET PEEP: 5 MMHG
SET RESPIRATORY RATE: 16 BPM
SET TIDAL VOLUME: 700 ML
SODIUM BLD-SCNC: 140 MEQ/L (ref 135–145)
SODIUM, WHOLE BLOOD: 140 MEQ/L (ref 138–146)
SODIUM, WHOLE BLOOD: 141 MEQ/L (ref 138–146)
SODIUM, WHOLE BLOOD: 141 MEQ/L (ref 138–146)
SODIUM, WHOLE BLOOD: 143 MEQ/L (ref 138–146)
SOURCE, BLOOD GAS: ABNORMAL
TOTAL CK: 1188 U/L (ref 55–170)
WBC # BLD: 19.4 THOU/MM3 (ref 4.8–10.8)

## 2018-08-03 PROCEDURE — 82948 REAGENT STRIP/BLOOD GLUCOSE: CPT

## 2018-08-03 PROCEDURE — 88307 TISSUE EXAM BY PATHOLOGIST: CPT

## 2018-08-03 PROCEDURE — 22610 ARTHRD PST TQ 1NTRSPC THRC: CPT | Performed by: NEUROLOGICAL SURGERY

## 2018-08-03 PROCEDURE — 85730 THROMBOPLASTIN TIME PARTIAL: CPT

## 2018-08-03 PROCEDURE — 85025 COMPLETE CBC W/AUTO DIFF WBC: CPT

## 2018-08-03 PROCEDURE — 3209999900 FLUORO FOR SURGICAL PROCEDURES

## 2018-08-03 PROCEDURE — 84295 ASSAY OF SERUM SODIUM: CPT

## 2018-08-03 PROCEDURE — 93010 ELECTROCARDIOGRAM REPORT: CPT | Performed by: INTERNAL MEDICINE

## 2018-08-03 PROCEDURE — 87086 URINE CULTURE/COLONY COUNT: CPT

## 2018-08-03 PROCEDURE — 2500000003 HC RX 250 WO HCPCS: Performed by: NURSE ANESTHETIST, CERTIFIED REGISTERED

## 2018-08-03 PROCEDURE — 82330 ASSAY OF CALCIUM: CPT

## 2018-08-03 PROCEDURE — 2709999900 HC NON-CHARGEABLE SUPPLY

## 2018-08-03 PROCEDURE — 99291 CRITICAL CARE FIRST HOUR: CPT | Performed by: INTERNAL MEDICINE

## 2018-08-03 PROCEDURE — 80048 BASIC METABOLIC PNL TOTAL CA: CPT

## 2018-08-03 PROCEDURE — 87641 MR-STAPH DNA AMP PROBE: CPT

## 2018-08-03 PROCEDURE — 2500000003 HC RX 250 WO HCPCS: Performed by: NEUROLOGICAL SURGERY

## 2018-08-03 PROCEDURE — 2580000003 HC RX 258: Performed by: ANESTHESIOLOGY

## 2018-08-03 PROCEDURE — 6360000002 HC RX W HCPCS

## 2018-08-03 PROCEDURE — 2500000003 HC RX 250 WO HCPCS: Performed by: ANESTHESIOLOGY

## 2018-08-03 PROCEDURE — 63276 BX/EXC XDRL SPINE LESN THRC: CPT | Performed by: NEUROLOGICAL SURGERY

## 2018-08-03 PROCEDURE — 2580000003 HC RX 258: Performed by: INTERNAL MEDICINE

## 2018-08-03 PROCEDURE — 82550 ASSAY OF CK (CPK): CPT

## 2018-08-03 PROCEDURE — 6370000000 HC RX 637 (ALT 250 FOR IP): Performed by: NURSE ANESTHETIST, CERTIFIED REGISTERED

## 2018-08-03 PROCEDURE — 88342 IMHCHEM/IMCYTCHM 1ST ANTB: CPT

## 2018-08-03 PROCEDURE — 22843 INSERT SPINE FIXATION DEVICE: CPT | Performed by: NEUROLOGICAL SURGERY

## 2018-08-03 PROCEDURE — 6360000002 HC RX W HCPCS: Performed by: INTERNAL MEDICINE

## 2018-08-03 PROCEDURE — 6360000002 HC RX W HCPCS: Performed by: NURSE ANESTHETIST, CERTIFIED REGISTERED

## 2018-08-03 PROCEDURE — 61783 SCAN PROC SPINAL: CPT | Performed by: NEUROLOGICAL SURGERY

## 2018-08-03 PROCEDURE — 6370000000 HC RX 637 (ALT 250 FOR IP): Performed by: PHYSICIAN ASSISTANT

## 2018-08-03 PROCEDURE — 71045 X-RAY EXAM CHEST 1 VIEW: CPT

## 2018-08-03 PROCEDURE — 88341 IMHCHEM/IMCYTCHM EA ADD ANTB: CPT

## 2018-08-03 PROCEDURE — 85018 HEMOGLOBIN: CPT

## 2018-08-03 PROCEDURE — 2580000003 HC RX 258: Performed by: NEUROLOGICAL SURGERY

## 2018-08-03 PROCEDURE — 2000000000 HC ICU R&B

## 2018-08-03 PROCEDURE — 6370000000 HC RX 637 (ALT 250 FOR IP): Performed by: NEUROLOGICAL SURGERY

## 2018-08-03 PROCEDURE — 6370000000 HC RX 637 (ALT 250 FOR IP): Performed by: INTERNAL MEDICINE

## 2018-08-03 PROCEDURE — 85610 PROTHROMBIN TIME: CPT

## 2018-08-03 PROCEDURE — 87081 CULTURE SCREEN ONLY: CPT

## 2018-08-03 PROCEDURE — 63101 REMOVE VERT BODY DCMPRN THRC: CPT | Performed by: NEUROLOGICAL SURGERY

## 2018-08-03 PROCEDURE — P9045 ALBUMIN (HUMAN), 5%, 250 ML: HCPCS | Performed by: ANESTHESIOLOGY

## 2018-08-03 PROCEDURE — 84132 ASSAY OF SERUM POTASSIUM: CPT

## 2018-08-03 PROCEDURE — 94002 VENT MGMT INPAT INIT DAY: CPT

## 2018-08-03 PROCEDURE — 2500000003 HC RX 250 WO HCPCS

## 2018-08-03 PROCEDURE — 82947 ASSAY GLUCOSE BLOOD QUANT: CPT

## 2018-08-03 PROCEDURE — 72020 X-RAY EXAM OF SPINE 1 VIEW: CPT

## 2018-08-03 PROCEDURE — 36415 COLL VENOUS BLD VENIPUNCTURE: CPT

## 2018-08-03 PROCEDURE — 6360000002 HC RX W HCPCS: Performed by: ANESTHESIOLOGY

## 2018-08-03 PROCEDURE — 82803 BLOOD GASES ANY COMBINATION: CPT

## 2018-08-03 PROCEDURE — 2580000003 HC RX 258: Performed by: NURSE ANESTHETIST, CERTIFIED REGISTERED

## 2018-08-03 PROCEDURE — 22614 ARTHRD PST TQ 1NTRSPC EA ADD: CPT | Performed by: NEUROLOGICAL SURGERY

## 2018-08-03 PROCEDURE — 37799 UNLISTED PX VASCULAR SURGERY: CPT

## 2018-08-03 DEVICE — IMPLANTABLE DEVICE: Type: IMPLANTABLE DEVICE | Site: BRAIN | Status: FUNCTIONAL

## 2018-08-03 RX ORDER — CEFAZOLIN SODIUM 1 G/3ML
INJECTION, POWDER, FOR SOLUTION INTRAMUSCULAR; INTRAVENOUS PRN
Status: DISCONTINUED | OUTPATIENT
Start: 2018-08-03 | End: 2018-08-03 | Stop reason: SDUPTHER

## 2018-08-03 RX ORDER — ALBUMIN, HUMAN INJ 5% 5 %
SOLUTION INTRAVENOUS PRN
Status: DISCONTINUED | OUTPATIENT
Start: 2018-08-03 | End: 2018-08-03 | Stop reason: SDUPTHER

## 2018-08-03 RX ORDER — PROPOFOL 10 MG/ML
INJECTION, EMULSION INTRAVENOUS PRN
Status: DISCONTINUED | OUTPATIENT
Start: 2018-08-02 | End: 2018-08-03 | Stop reason: SDUPTHER

## 2018-08-03 RX ORDER — EPHEDRINE SULFATE 50 MG/ML
INJECTION INTRAVENOUS PRN
Status: DISCONTINUED | OUTPATIENT
Start: 2018-08-02 | End: 2018-08-03 | Stop reason: SDUPTHER

## 2018-08-03 RX ORDER — SODIUM CHLORIDE 9 MG/ML
INJECTION, SOLUTION INTRAVENOUS CONTINUOUS PRN
Status: DISCONTINUED | OUTPATIENT
Start: 2018-08-02 | End: 2018-08-03 | Stop reason: SDUPTHER

## 2018-08-03 RX ORDER — MIDAZOLAM HYDROCHLORIDE 1 MG/ML
INJECTION INTRAMUSCULAR; INTRAVENOUS PRN
Status: DISCONTINUED | OUTPATIENT
Start: 2018-08-02 | End: 2018-08-03 | Stop reason: SDUPTHER

## 2018-08-03 RX ORDER — SODIUM CHLORIDE, SODIUM LACTATE, POTASSIUM CHLORIDE, CALCIUM CHLORIDE 600; 310; 30; 20 MG/100ML; MG/100ML; MG/100ML; MG/100ML
INJECTION, SOLUTION INTRAVENOUS CONTINUOUS
Status: DISCONTINUED | OUTPATIENT
Start: 2018-08-03 | End: 2018-08-05 | Stop reason: ALTCHOICE

## 2018-08-03 RX ORDER — FENTANYL CITRATE 50 UG/ML
INJECTION, SOLUTION INTRAMUSCULAR; INTRAVENOUS PRN
Status: DISCONTINUED | OUTPATIENT
Start: 2018-08-02 | End: 2018-08-03 | Stop reason: SDUPTHER

## 2018-08-03 RX ORDER — METOPROLOL TARTRATE 5 MG/5ML
2 INJECTION INTRAVENOUS ONCE
Status: COMPLETED | OUTPATIENT
Start: 2018-08-03 | End: 2018-08-03

## 2018-08-03 RX ORDER — MIDAZOLAM HYDROCHLORIDE 5 MG/ML
INJECTION INTRAMUSCULAR; INTRAVENOUS
Status: COMPLETED
Start: 2018-08-03 | End: 2018-08-03

## 2018-08-03 RX ORDER — MIDAZOLAM HYDROCHLORIDE 1 MG/ML
5 INJECTION INTRAMUSCULAR; INTRAVENOUS ONCE
Status: DISCONTINUED | OUTPATIENT
Start: 2018-08-03 | End: 2018-08-03 | Stop reason: HOSPADM

## 2018-08-03 RX ORDER — HYDROMORPHONE HCL 110MG/55ML
PATIENT CONTROLLED ANALGESIA SYRINGE INTRAVENOUS PRN
Status: DISCONTINUED | OUTPATIENT
Start: 2018-08-03 | End: 2018-08-03 | Stop reason: SDUPTHER

## 2018-08-03 RX ORDER — SUCCINYLCHOLINE CHLORIDE 20 MG/ML
INJECTION INTRAMUSCULAR; INTRAVENOUS PRN
Status: DISCONTINUED | OUTPATIENT
Start: 2018-08-02 | End: 2018-08-03 | Stop reason: SDUPTHER

## 2018-08-03 RX ORDER — METOPROLOL TARTRATE 5 MG/5ML
INJECTION INTRAVENOUS
Status: COMPLETED
Start: 2018-08-03 | End: 2018-08-03

## 2018-08-03 RX ORDER — PROPOFOL 10 MG/ML
INJECTION, EMULSION INTRAVENOUS CONTINUOUS PRN
Status: DISCONTINUED | OUTPATIENT
Start: 2018-08-02 | End: 2018-08-03 | Stop reason: SDUPTHER

## 2018-08-03 RX ORDER — GINSENG 100 MG
CAPSULE ORAL PRN
Status: DISCONTINUED | OUTPATIENT
Start: 2018-08-03 | End: 2018-08-03 | Stop reason: HOSPADM

## 2018-08-03 RX ORDER — ROCURONIUM BROMIDE 10 MG/ML
INJECTION, SOLUTION INTRAVENOUS PRN
Status: DISCONTINUED | OUTPATIENT
Start: 2018-08-03 | End: 2018-08-03 | Stop reason: SDUPTHER

## 2018-08-03 RX ORDER — ROCURONIUM BROMIDE 10 MG/ML
30 INJECTION, SOLUTION INTRAVENOUS ONCE
Status: COMPLETED | OUTPATIENT
Start: 2018-08-03 | End: 2018-08-03

## 2018-08-03 RX ORDER — CALCIUM CHLORIDE 100 MG/ML
INJECTION INTRAVENOUS; INTRAVENTRICULAR PRN
Status: DISCONTINUED | OUTPATIENT
Start: 2018-08-03 | End: 2018-08-03 | Stop reason: SDUPTHER

## 2018-08-03 RX ADMIN — CALCIUM CHLORIDE 0.2 G: 100 INJECTION INTRAVENOUS; INTRAVENTRICULAR at 03:47

## 2018-08-03 RX ADMIN — ALBUMIN (HUMAN) 12.5 G: 12.5 INJECTION, SOLUTION INTRAVENOUS at 06:30

## 2018-08-03 RX ADMIN — SODIUM CHLORIDE: 9 INJECTION, SOLUTION INTRAVENOUS at 13:30

## 2018-08-03 RX ADMIN — SUGAMMADEX 253 MG: 100 INJECTION, SOLUTION INTRAVENOUS at 05:45

## 2018-08-03 RX ADMIN — MIDAZOLAM HYDROCHLORIDE 1 MG/HR: 5 INJECTION, SOLUTION INTRAMUSCULAR; INTRAVENOUS at 18:26

## 2018-08-03 RX ADMIN — PHENYLEPHRINE HYDROCHLORIDE 100 MCG: 10 INJECTION INTRAVENOUS at 13:20

## 2018-08-03 RX ADMIN — SODIUM BICARBONATE 50 MEQ: 84 INJECTION, SOLUTION INTRAVENOUS at 10:33

## 2018-08-03 RX ADMIN — SODIUM CHLORIDE, POTASSIUM CHLORIDE, SODIUM LACTATE AND CALCIUM CHLORIDE: 600; 310; 30; 20 INJECTION, SOLUTION INTRAVENOUS at 18:26

## 2018-08-03 RX ADMIN — METOPROLOL TARTRATE 2 MG: 5 INJECTION INTRAVENOUS at 14:54

## 2018-08-03 RX ADMIN — PHENYLEPHRINE HYDROCHLORIDE 200 MCG: 10 INJECTION INTRAVENOUS at 00:15

## 2018-08-03 RX ADMIN — CALCIUM CHLORIDE 0.5 G: 100 INJECTION INTRAVENOUS; INTRAVENTRICULAR at 13:23

## 2018-08-03 RX ADMIN — CALCIUM CHLORIDE 0.2 G: 100 INJECTION INTRAVENOUS; INTRAVENTRICULAR at 04:34

## 2018-08-03 RX ADMIN — Medication 2 UNITS: at 22:36

## 2018-08-03 RX ADMIN — PHENYLEPHRINE HYDROCHLORIDE 200 MCG: 10 INJECTION INTRAVENOUS at 00:50

## 2018-08-03 RX ADMIN — PHENYLEPHRINE HYDROCHLORIDE 300 MCG: 10 INJECTION INTRAVENOUS at 00:30

## 2018-08-03 RX ADMIN — HYDROMORPHONE HYDROCHLORIDE 0.25 MG: 2 INJECTION INTRAMUSCULAR; INTRAVENOUS; SUBCUTANEOUS at 03:08

## 2018-08-03 RX ADMIN — ROCURONIUM BROMIDE 30 MG: 10 INJECTION INTRAVENOUS at 04:12

## 2018-08-03 RX ADMIN — PHENYLEPHRINE HYDROCHLORIDE 20 MCG/MIN: 10 INJECTION INTRAVENOUS at 05:15

## 2018-08-03 RX ADMIN — ONDANSETRON 4 MG: 2 INJECTION INTRAMUSCULAR; INTRAVENOUS at 17:40

## 2018-08-03 RX ADMIN — FENTANYL CITRATE 50 MCG: 50 INJECTION INTRAMUSCULAR; INTRAVENOUS at 01:07

## 2018-08-03 RX ADMIN — HYDROMORPHONE HYDROCHLORIDE 0.25 MG: 2 INJECTION INTRAMUSCULAR; INTRAVENOUS; SUBCUTANEOUS at 05:46

## 2018-08-03 RX ADMIN — HYDROMORPHONE HYDROCHLORIDE 0.25 MG: 2 INJECTION INTRAMUSCULAR; INTRAVENOUS; SUBCUTANEOUS at 09:34

## 2018-08-03 RX ADMIN — ROCURONIUM BROMIDE 20 MG: 10 INJECTION INTRAVENOUS at 05:06

## 2018-08-03 RX ADMIN — PHENYLEPHRINE HYDROCHLORIDE 200 MCG: 10 INJECTION INTRAVENOUS at 00:20

## 2018-08-03 RX ADMIN — MIDAZOLAM 5 MG: 5 INJECTION INTRAMUSCULAR; INTRAVENOUS at 15:48

## 2018-08-03 RX ADMIN — CALCIUM CHLORIDE 0.2 G: 100 INJECTION INTRAVENOUS; INTRAVENTRICULAR at 03:50

## 2018-08-03 RX ADMIN — PHENYLEPHRINE HYDROCHLORIDE 200 MCG: 10 INJECTION INTRAVENOUS at 01:00

## 2018-08-03 RX ADMIN — HYDROMORPHONE HYDROCHLORIDE 0.25 MG: 2 INJECTION INTRAMUSCULAR; INTRAVENOUS; SUBCUTANEOUS at 01:31

## 2018-08-03 RX ADMIN — Medication 6 UNITS: at 09:10

## 2018-08-03 RX ADMIN — SODIUM CHLORIDE: 9 INJECTION, SOLUTION INTRAVENOUS at 07:57

## 2018-08-03 RX ADMIN — CEFAZOLIN 2000 MG: 330 INJECTION, POWDER, FOR SOLUTION INTRAMUSCULAR; INTRAVENOUS at 08:16

## 2018-08-03 RX ADMIN — ROCURONIUM BROMIDE 50 MG: 10 INJECTION INTRAVENOUS at 02:31

## 2018-08-03 RX ADMIN — SODIUM BICARBONATE 25 MEQ: 84 INJECTION, SOLUTION INTRAVENOUS at 13:25

## 2018-08-03 RX ADMIN — HYDROMORPHONE HYDROCHLORIDE 0.25 MG: 2 INJECTION INTRAMUSCULAR; INTRAVENOUS; SUBCUTANEOUS at 11:24

## 2018-08-03 RX ADMIN — SODIUM CHLORIDE: 9 INJECTION, SOLUTION INTRAVENOUS at 12:35

## 2018-08-03 RX ADMIN — HYDROMORPHONE HYDROCHLORIDE 0.5 MG: 2 INJECTION INTRAMUSCULAR; INTRAVENOUS; SUBCUTANEOUS at 07:11

## 2018-08-03 RX ADMIN — ROCURONIUM BROMIDE 20 MG: 10 INJECTION INTRAVENOUS at 03:37

## 2018-08-03 RX ADMIN — ROCURONIUM BROMIDE 30 MG: 10 INJECTION INTRAVENOUS at 15:52

## 2018-08-03 RX ADMIN — METOPROLOL TARTRATE 2 MG: 1 INJECTION, SOLUTION INTRAVENOUS at 14:54

## 2018-08-03 RX ADMIN — CALCIUM CHLORIDE 0.2 G: 100 INJECTION INTRAVENOUS; INTRAVENTRICULAR at 04:02

## 2018-08-03 RX ADMIN — MIDAZOLAM HYDROCHLORIDE 2 MG: 1 INJECTION, SOLUTION INTRAMUSCULAR; INTRAVENOUS at 08:14

## 2018-08-03 RX ADMIN — FENTANYL CITRATE 25 MCG: 50 INJECTION INTRAMUSCULAR; INTRAVENOUS at 02:07

## 2018-08-03 RX ADMIN — PHENYLEPHRINE HYDROCHLORIDE 200 MCG: 10 INJECTION INTRAVENOUS at 00:05

## 2018-08-03 RX ADMIN — FENTANYL CITRATE 25 MCG: 50 INJECTION INTRAMUSCULAR; INTRAVENOUS at 05:49

## 2018-08-03 RX ADMIN — HYDROMORPHONE HYDROCHLORIDE 0.5 MG: 2 INJECTION INTRAMUSCULAR; INTRAVENOUS; SUBCUTANEOUS at 07:58

## 2018-08-03 RX ADMIN — PHENYLEPHRINE HYDROCHLORIDE 300 MCG: 10 INJECTION INTRAVENOUS at 00:40

## 2018-08-03 RX ADMIN — PHENYLEPHRINE HYDROCHLORIDE 200 MCG: 10 INJECTION INTRAVENOUS at 05:20

## 2018-08-03 RX ADMIN — NYSTATIN 500000 UNITS: 100000 SUSPENSION ORAL at 17:14

## 2018-08-03 RX ADMIN — CEFAZOLIN 2000 MG: 330 INJECTION, POWDER, FOR SOLUTION INTRAMUSCULAR; INTRAVENOUS at 04:16

## 2018-08-03 RX ADMIN — SODIUM CHLORIDE: 9 INJECTION, SOLUTION INTRAVENOUS at 09:45

## 2018-08-03 RX ADMIN — HYDROMORPHONE HYDROCHLORIDE 0.25 MG: 2 INJECTION INTRAMUSCULAR; INTRAVENOUS; SUBCUTANEOUS at 06:06

## 2018-08-03 RX ADMIN — EPHEDRINE SULFATE 10 MG: 50 INJECTION, SOLUTION INTRAVENOUS at 03:20

## 2018-08-03 RX ADMIN — NYSTATIN 500000 UNITS: 100000 SUSPENSION ORAL at 22:36

## 2018-08-03 RX ADMIN — HYDROMORPHONE HYDROCHLORIDE 0.25 MG: 2 INJECTION INTRAMUSCULAR; INTRAVENOUS; SUBCUTANEOUS at 09:55

## 2018-08-03 RX ADMIN — CALCIUM CHLORIDE 0.5 G: 100 INJECTION INTRAVENOUS; INTRAVENTRICULAR at 10:23

## 2018-08-03 RX ADMIN — MORPHINE SULFATE 2 MG: 2 INJECTION, SOLUTION INTRAMUSCULAR; INTRAVENOUS at 16:49

## 2018-08-03 RX ADMIN — INSULIN LISPRO 1 UNITS: 100 INJECTION, SOLUTION INTRAVENOUS; SUBCUTANEOUS at 17:15

## 2018-08-03 RX ADMIN — HYDROMORPHONE HYDROCHLORIDE 0.25 MG: 2 INJECTION INTRAMUSCULAR; INTRAVENOUS; SUBCUTANEOUS at 11:21

## 2018-08-03 RX ADMIN — GABAPENTIN 600 MG: 600 TABLET, FILM COATED ORAL at 22:36

## 2018-08-03 RX ADMIN — PHENYLEPHRINE HYDROCHLORIDE 200 MCG: 10 INJECTION INTRAVENOUS at 02:30

## 2018-08-03 RX ADMIN — PHENYLEPHRINE HYDROCHLORIDE 200 MCG: 10 INJECTION INTRAVENOUS at 00:00

## 2018-08-03 RX ADMIN — DEXAMETHASONE SODIUM PHOSPHATE 10 MG: 4 INJECTION, SOLUTION INTRA-ARTICULAR; INTRALESIONAL; INTRAMUSCULAR; INTRAVENOUS; SOFT TISSUE at 17:14

## 2018-08-03 RX ADMIN — CEFAZOLIN 2000 MG: 330 INJECTION, POWDER, FOR SOLUTION INTRAMUSCULAR; INTRAVENOUS at 12:20

## 2018-08-03 RX ADMIN — CEFAZOLIN 2000 MG: 330 INJECTION, POWDER, FOR SOLUTION INTRAMUSCULAR; INTRAVENOUS at 00:13

## 2018-08-03 ASSESSMENT — PULMONARY FUNCTION TESTS
PIF_VALUE: 34
PIF_VALUE: 30
PIF_VALUE: 26
PIF_VALUE: 28
PIF_VALUE: 26
PIF_VALUE: 25
PIF_VALUE: 26
PIF_VALUE: 23
PIF_VALUE: 30
PIF_VALUE: 34
PIF_VALUE: 31
PIF_VALUE: 33
PIF_VALUE: 29
PIF_VALUE: 27
PIF_VALUE: 32
PIF_VALUE: 26
PIF_VALUE: 26
PIF_VALUE: 27
PIF_VALUE: 26
PIF_VALUE: 30
PIF_VALUE: 35
PIF_VALUE: 27
PIF_VALUE: 28
PIF_VALUE: 26
PIF_VALUE: 26
PIF_VALUE: 30
PIF_VALUE: 28
PIF_VALUE: 26
PIF_VALUE: 26
PIF_VALUE: 31
PIF_VALUE: 31
PIF_VALUE: 20
PIF_VALUE: 27
PIF_VALUE: 27
PIF_VALUE: 31
PIF_VALUE: 28
PIF_VALUE: 30
PIF_VALUE: 32
PIF_VALUE: 33
PIF_VALUE: 25
PIF_VALUE: 33
PIF_VALUE: 29
PIF_VALUE: 26
PIF_VALUE: 26
PIF_VALUE: 28
PIF_VALUE: 30
PIF_VALUE: 29
PIF_VALUE: 26
PIF_VALUE: 26
PIF_VALUE: 34
PIF_VALUE: 30
PIF_VALUE: 26
PIF_VALUE: 26
PIF_VALUE: 27
PIF_VALUE: 26
PIF_VALUE: 33
PIF_VALUE: 28
PIF_VALUE: 26
PIF_VALUE: 25
PIF_VALUE: 25
PIF_VALUE: 26
PIF_VALUE: 32
PIF_VALUE: 26
PIF_VALUE: 21
PIF_VALUE: 28
PIF_VALUE: 27
PIF_VALUE: 37
PIF_VALUE: 35
PIF_VALUE: 26
PIF_VALUE: 32
PIF_VALUE: 33
PIF_VALUE: 29
PIF_VALUE: 28
PIF_VALUE: 28
PIF_VALUE: 27
PIF_VALUE: 30
PIF_VALUE: 33
PIF_VALUE: 33
PIF_VALUE: 26
PIF_VALUE: 28
PIF_VALUE: 21
PIF_VALUE: 27
PIF_VALUE: 4
PIF_VALUE: 34
PIF_VALUE: 34
PIF_VALUE: 29
PIF_VALUE: 26
PIF_VALUE: 27
PIF_VALUE: 25
PIF_VALUE: 22
PIF_VALUE: 30
PIF_VALUE: 32
PIF_VALUE: 27
PIF_VALUE: 30
PIF_VALUE: 27
PIF_VALUE: 32
PIF_VALUE: 32
PIF_VALUE: 24
PIF_VALUE: 26
PIF_VALUE: 24
PIF_VALUE: 34
PIF_VALUE: 30
PIF_VALUE: 31
PIF_VALUE: 32
PIF_VALUE: 26
PIF_VALUE: 26
PIF_VALUE: 21
PIF_VALUE: 32
PIF_VALUE: 32
PIF_VALUE: 26
PIF_VALUE: 27
PIF_VALUE: 31
PIF_VALUE: 30
PIF_VALUE: 29
PIF_VALUE: 28
PIF_VALUE: 26
PIF_VALUE: 26
PIF_VALUE: 28
PIF_VALUE: 26
PIF_VALUE: 33
PIF_VALUE: 34
PIF_VALUE: 30
PIF_VALUE: 33
PIF_VALUE: 30
PIF_VALUE: 26
PIF_VALUE: 28
PIF_VALUE: 30
PIF_VALUE: 27
PIF_VALUE: 29
PIF_VALUE: 24
PIF_VALUE: 26
PIF_VALUE: 25
PIF_VALUE: 28
PIF_VALUE: 30
PIF_VALUE: 31
PIF_VALUE: 27
PIF_VALUE: 33
PIF_VALUE: 28
PIF_VALUE: 31
PIF_VALUE: 26
PIF_VALUE: 26
PIF_VALUE: 31
PIF_VALUE: 32
PIF_VALUE: 30
PIF_VALUE: 32
PIF_VALUE: 27
PIF_VALUE: 26
PIF_VALUE: 34
PIF_VALUE: 31
PIF_VALUE: 31
PIF_VALUE: 30
PIF_VALUE: 30
PIF_VALUE: 26
PIF_VALUE: 17
PIF_VALUE: 14
PIF_VALUE: 27
PIF_VALUE: 28
PIF_VALUE: 26
PIF_VALUE: 4
PIF_VALUE: 25
PIF_VALUE: 27
PIF_VALUE: 34
PIF_VALUE: 27
PIF_VALUE: 28
PIF_VALUE: 25
PIF_VALUE: 33
PIF_VALUE: 30
PIF_VALUE: 26
PIF_VALUE: 30
PIF_VALUE: 26
PIF_VALUE: 26
PIF_VALUE: 28
PIF_VALUE: 27
PIF_VALUE: 26
PIF_VALUE: 27
PIF_VALUE: 28
PIF_VALUE: 35
PIF_VALUE: 22
PIF_VALUE: 31
PIF_VALUE: 25
PIF_VALUE: 32
PIF_VALUE: 26
PIF_VALUE: 26
PIF_VALUE: 28
PIF_VALUE: 26
PIF_VALUE: 28
PIF_VALUE: 26
PIF_VALUE: 27
PIF_VALUE: 26
PIF_VALUE: 34
PIF_VALUE: 26
PIF_VALUE: 28
PIF_VALUE: 26
PIF_VALUE: 30
PIF_VALUE: 26
PIF_VALUE: 26
PIF_VALUE: 28
PIF_VALUE: 27
PIF_VALUE: 26
PIF_VALUE: 26
PIF_VALUE: 31
PIF_VALUE: 26
PIF_VALUE: 26
PIF_VALUE: 28
PIF_VALUE: 31
PIF_VALUE: 24
PIF_VALUE: 26
PIF_VALUE: 28
PIF_VALUE: 26
PIF_VALUE: 25
PIF_VALUE: 27
PIF_VALUE: 26
PIF_VALUE: 32
PIF_VALUE: 26
PIF_VALUE: 25
PIF_VALUE: 26
PIF_VALUE: 33
PIF_VALUE: 27
PIF_VALUE: 25
PIF_VALUE: 34
PIF_VALUE: 15
PIF_VALUE: 26
PIF_VALUE: 29
PIF_VALUE: 26
PIF_VALUE: 31
PIF_VALUE: 30
PIF_VALUE: 32
PIF_VALUE: 28
PIF_VALUE: 26
PIF_VALUE: 26
PIF_VALUE: 28
PIF_VALUE: 27
PIF_VALUE: 26
PIF_VALUE: 27
PIF_VALUE: 30
PIF_VALUE: 27
PIF_VALUE: 27
PIF_VALUE: 34
PIF_VALUE: 26
PIF_VALUE: 28
PIF_VALUE: 28
PIF_VALUE: 35
PIF_VALUE: 31
PIF_VALUE: 33
PIF_VALUE: 25
PIF_VALUE: 26
PIF_VALUE: 28
PIF_VALUE: 30
PIF_VALUE: 30
PIF_VALUE: 26
PIF_VALUE: 30
PIF_VALUE: 30
PIF_VALUE: 26
PIF_VALUE: 26
PIF_VALUE: 31
PIF_VALUE: 31
PIF_VALUE: 28
PIF_VALUE: 26
PIF_VALUE: 31
PIF_VALUE: 28
PIF_VALUE: 27
PIF_VALUE: 21
PIF_VALUE: 25
PIF_VALUE: 26
PIF_VALUE: 25
PIF_VALUE: 30
PIF_VALUE: 30
PIF_VALUE: 34
PIF_VALUE: 26
PIF_VALUE: 32
PIF_VALUE: 26
PIF_VALUE: 26
PIF_VALUE: 24
PIF_VALUE: 32
PIF_VALUE: 26
PIF_VALUE: 26
PIF_VALUE: 30
PIF_VALUE: 26
PIF_VALUE: 23
PIF_VALUE: 26
PIF_VALUE: 29
PIF_VALUE: 35
PIF_VALUE: 26
PIF_VALUE: 28
PIF_VALUE: 28
PIF_VALUE: 33
PIF_VALUE: 28
PIF_VALUE: 27
PIF_VALUE: 32
PIF_VALUE: 25
PIF_VALUE: 21
PIF_VALUE: 25
PIF_VALUE: 26
PIF_VALUE: 26
PIF_VALUE: 25
PIF_VALUE: 27
PIF_VALUE: 27
PIF_VALUE: 31
PIF_VALUE: 30
PIF_VALUE: 28
PIF_VALUE: 26
PIF_VALUE: 24
PIF_VALUE: 31
PIF_VALUE: 33
PIF_VALUE: 26
PIF_VALUE: 27
PIF_VALUE: 31
PIF_VALUE: 31
PIF_VALUE: 25
PIF_VALUE: 30
PIF_VALUE: 22
PIF_VALUE: 28
PIF_VALUE: 30
PIF_VALUE: 27
PIF_VALUE: 32
PIF_VALUE: 26
PIF_VALUE: 32
PIF_VALUE: 30
PIF_VALUE: 26
PIF_VALUE: 30
PIF_VALUE: 27
PIF_VALUE: 24
PIF_VALUE: 21
PIF_VALUE: 27
PIF_VALUE: 30
PIF_VALUE: 26
PIF_VALUE: 26
PIF_VALUE: 32
PIF_VALUE: 26
PIF_VALUE: 31
PIF_VALUE: 32
PIF_VALUE: 26
PIF_VALUE: 30
PIF_VALUE: 26
PIF_VALUE: 33
PIF_VALUE: 28
PIF_VALUE: 33
PIF_VALUE: 29
PIF_VALUE: 30
PIF_VALUE: 31
PIF_VALUE: 8
PIF_VALUE: 26
PIF_VALUE: 28
PIF_VALUE: 31
PIF_VALUE: 30
PIF_VALUE: 31
PIF_VALUE: 28
PIF_VALUE: 26
PIF_VALUE: 26
PIF_VALUE: 28
PIF_VALUE: 32
PIF_VALUE: 3
PIF_VALUE: 27
PIF_VALUE: 27
PIF_VALUE: 31
PIF_VALUE: 26
PIF_VALUE: 30
PIF_VALUE: 29
PIF_VALUE: 23
PIF_VALUE: 27
PIF_VALUE: 32
PIF_VALUE: 26
PIF_VALUE: 26
PIF_VALUE: 28
PIF_VALUE: 23
PIF_VALUE: 29
PIF_VALUE: 32
PIF_VALUE: 26
PIF_VALUE: 32
PIF_VALUE: 30
PIF_VALUE: 33
PIF_VALUE: 26
PIF_VALUE: 32
PIF_VALUE: 26
PIF_VALUE: 31
PIF_VALUE: 27
PIF_VALUE: 35
PIF_VALUE: 31
PIF_VALUE: 30
PIF_VALUE: 34
PIF_VALUE: 25
PIF_VALUE: 29
PIF_VALUE: 29
PIF_VALUE: 28
PIF_VALUE: 26
PIF_VALUE: 26
PIF_VALUE: 28
PIF_VALUE: 28
PIF_VALUE: 26
PIF_VALUE: 31
PIF_VALUE: 27
PIF_VALUE: 32
PIF_VALUE: 31
PIF_VALUE: 34
PIF_VALUE: 27
PIF_VALUE: 26
PIF_VALUE: 21
PIF_VALUE: 32
PIF_VALUE: 13
PIF_VALUE: 33
PIF_VALUE: 26
PIF_VALUE: 27
PIF_VALUE: 30
PIF_VALUE: 34
PIF_VALUE: 27
PIF_VALUE: 25
PIF_VALUE: 25
PIF_VALUE: 26
PIF_VALUE: 33
PIF_VALUE: 27
PIF_VALUE: 22
PIF_VALUE: 26
PIF_VALUE: 30
PIF_VALUE: 29
PIF_VALUE: 26
PIF_VALUE: 28
PIF_VALUE: 28
PIF_VALUE: 31
PIF_VALUE: 26
PIF_VALUE: 27
PIF_VALUE: 27
PIF_VALUE: 30
PIF_VALUE: 26
PIF_VALUE: 26
PIF_VALUE: 23
PIF_VALUE: 26
PIF_VALUE: 26
PIF_VALUE: 27
PIF_VALUE: 35
PIF_VALUE: 31
PIF_VALUE: 26
PIF_VALUE: 27
PIF_VALUE: 30
PIF_VALUE: 26
PIF_VALUE: 27
PIF_VALUE: 25
PIF_VALUE: 30
PIF_VALUE: 24
PIF_VALUE: 26
PIF_VALUE: 31
PIF_VALUE: 27
PIF_VALUE: 26
PIF_VALUE: 18
PIF_VALUE: 31
PIF_VALUE: 28
PIF_VALUE: 33
PIF_VALUE: 35
PIF_VALUE: 26
PIF_VALUE: 31
PIF_VALUE: 26
PIF_VALUE: 26
PIF_VALUE: 31
PIF_VALUE: 33
PIF_VALUE: 27
PIF_VALUE: 26
PIF_VALUE: 29
PIF_VALUE: 27
PIF_VALUE: 26
PIF_VALUE: 31
PIF_VALUE: 32
PIF_VALUE: 28
PIF_VALUE: 28
PIF_VALUE: 27
PIF_VALUE: 26
PIF_VALUE: 28
PIF_VALUE: 25
PIF_VALUE: 26
PIF_VALUE: 32
PIF_VALUE: 26
PIF_VALUE: 24
PIF_VALUE: 26
PIF_VALUE: 28
PIF_VALUE: 21
PIF_VALUE: 29
PIF_VALUE: 34
PIF_VALUE: 33
PIF_VALUE: 26
PIF_VALUE: 28
PIF_VALUE: 25
PIF_VALUE: 32
PIF_VALUE: 34
PIF_VALUE: 24
PIF_VALUE: 25
PIF_VALUE: 28
PIF_VALUE: 30
PIF_VALUE: 35
PIF_VALUE: 31
PIF_VALUE: 26
PIF_VALUE: 28
PIF_VALUE: 26
PIF_VALUE: 33
PIF_VALUE: 30
PIF_VALUE: 26
PIF_VALUE: 18
PIF_VALUE: 28
PIF_VALUE: 26
PIF_VALUE: 27
PIF_VALUE: 27
PIF_VALUE: 33
PIF_VALUE: 34
PIF_VALUE: 28
PIF_VALUE: 26
PIF_VALUE: 27
PIF_VALUE: 25
PIF_VALUE: 30
PIF_VALUE: 26
PIF_VALUE: 26
PIF_VALUE: 27
PIF_VALUE: 35
PIF_VALUE: 30
PIF_VALUE: 28
PIF_VALUE: 31
PIF_VALUE: 26
PIF_VALUE: 29
PIF_VALUE: 26
PIF_VALUE: 33
PIF_VALUE: 26
PIF_VALUE: 30
PIF_VALUE: 30
PIF_VALUE: 0
PIF_VALUE: 33
PIF_VALUE: 26
PIF_VALUE: 26
PIF_VALUE: 33
PIF_VALUE: 29
PIF_VALUE: 21
PIF_VALUE: 31
PIF_VALUE: 31
PIF_VALUE: 26
PIF_VALUE: 32
PIF_VALUE: 26
PIF_VALUE: 26
PIF_VALUE: 11
PIF_VALUE: 30
PIF_VALUE: 26
PIF_VALUE: 26
PIF_VALUE: 23
PIF_VALUE: 32
PIF_VALUE: 25
PIF_VALUE: 26
PIF_VALUE: 27
PIF_VALUE: 26
PIF_VALUE: 29
PIF_VALUE: 27
PIF_VALUE: 28
PIF_VALUE: 27
PIF_VALUE: 31
PIF_VALUE: 24
PIF_VALUE: 35
PIF_VALUE: 26
PIF_VALUE: 30
PIF_VALUE: 27
PIF_VALUE: 30
PIF_VALUE: 5
PIF_VALUE: 28
PIF_VALUE: 27
PIF_VALUE: 26
PIF_VALUE: 24
PIF_VALUE: 26
PIF_VALUE: 27
PIF_VALUE: 29
PIF_VALUE: 27
PIF_VALUE: 6
PIF_VALUE: 30
PIF_VALUE: 27
PIF_VALUE: 29
PIF_VALUE: 29
PIF_VALUE: 26
PIF_VALUE: 26
PIF_VALUE: 35
PIF_VALUE: 21
PIF_VALUE: 25
PIF_VALUE: 27
PIF_VALUE: 33
PIF_VALUE: 26
PIF_VALUE: 34
PIF_VALUE: 26
PIF_VALUE: 29
PIF_VALUE: 26
PIF_VALUE: 25
PIF_VALUE: 30
PIF_VALUE: 28
PIF_VALUE: 27
PIF_VALUE: 31
PIF_VALUE: 29
PIF_VALUE: 33
PIF_VALUE: 28
PIF_VALUE: 27
PIF_VALUE: 31
PIF_VALUE: 31
PIF_VALUE: 27
PIF_VALUE: 26
PIF_VALUE: 30
PIF_VALUE: 31
PIF_VALUE: 27
PIF_VALUE: 26
PIF_VALUE: 34
PIF_VALUE: 29
PIF_VALUE: 31
PIF_VALUE: 31
PIF_VALUE: 26
PIF_VALUE: 31
PIF_VALUE: 35
PIF_VALUE: 26
PIF_VALUE: 31
PIF_VALUE: 25
PIF_VALUE: 26
PIF_VALUE: 26
PIF_VALUE: 29
PIF_VALUE: 4
PIF_VALUE: 26
PIF_VALUE: 27
PIF_VALUE: 30
PIF_VALUE: 30
PIF_VALUE: 29
PIF_VALUE: 26
PIF_VALUE: 26
PIF_VALUE: 30
PIF_VALUE: 25
PIF_VALUE: 26
PIF_VALUE: 28
PIF_VALUE: 26
PIF_VALUE: 22
PIF_VALUE: 26
PIF_VALUE: 32
PIF_VALUE: 32
PIF_VALUE: 26
PIF_VALUE: 19
PIF_VALUE: 27
PIF_VALUE: 28
PIF_VALUE: 26
PIF_VALUE: 30
PIF_VALUE: 26
PIF_VALUE: 27
PIF_VALUE: 31
PIF_VALUE: 26
PIF_VALUE: 27
PIF_VALUE: 33
PIF_VALUE: 27
PIF_VALUE: 26
PIF_VALUE: 24
PIF_VALUE: 29
PIF_VALUE: 33
PIF_VALUE: 26
PIF_VALUE: 35
PIF_VALUE: 21
PIF_VALUE: 24
PIF_VALUE: 26
PIF_VALUE: 26
PIF_VALUE: 30
PIF_VALUE: 26
PIF_VALUE: 23
PIF_VALUE: 26
PIF_VALUE: 31
PIF_VALUE: 33
PIF_VALUE: 34
PIF_VALUE: 31
PIF_VALUE: 27
PIF_VALUE: 26
PIF_VALUE: 24
PIF_VALUE: 26
PIF_VALUE: 30
PIF_VALUE: 26
PIF_VALUE: 12
PIF_VALUE: 26
PIF_VALUE: 32
PIF_VALUE: 34
PIF_VALUE: 26
PIF_VALUE: 26
PIF_VALUE: 28
PIF_VALUE: 26
PIF_VALUE: 31
PIF_VALUE: 25
PIF_VALUE: 22
PIF_VALUE: 27
PIF_VALUE: 32
PIF_VALUE: 24
PIF_VALUE: 28
PIF_VALUE: 30
PIF_VALUE: 26
PIF_VALUE: 26
PIF_VALUE: 25
PIF_VALUE: 27
PIF_VALUE: 26
PIF_VALUE: 26
PIF_VALUE: 35
PIF_VALUE: 28
PIF_VALUE: 33
PIF_VALUE: 26
PIF_VALUE: 22
PIF_VALUE: 27
PIF_VALUE: 22
PIF_VALUE: 25
PIF_VALUE: 27
PIF_VALUE: 26
PIF_VALUE: 31
PIF_VALUE: 31
PIF_VALUE: 33
PIF_VALUE: 26
PIF_VALUE: 28
PIF_VALUE: 27
PIF_VALUE: 35
PIF_VALUE: 26
PIF_VALUE: 31
PIF_VALUE: 26
PIF_VALUE: 24
PIF_VALUE: 29
PIF_VALUE: 28
PIF_VALUE: 28
PIF_VALUE: 31
PIF_VALUE: 22
PIF_VALUE: 29
PIF_VALUE: 26
PIF_VALUE: 32
PIF_VALUE: 27
PIF_VALUE: 26
PIF_VALUE: 33
PIF_VALUE: 31
PIF_VALUE: 26
PIF_VALUE: 26
PIF_VALUE: 27
PIF_VALUE: 26
PIF_VALUE: 26
PIF_VALUE: 25
PIF_VALUE: 26
PIF_VALUE: 30
PIF_VALUE: 22
PIF_VALUE: 27
PIF_VALUE: 26
PIF_VALUE: 32
PIF_VALUE: 29
PIF_VALUE: 12
PIF_VALUE: 30
PIF_VALUE: 32
PIF_VALUE: 25
PIF_VALUE: 28
PIF_VALUE: 26
PIF_VALUE: 33
PIF_VALUE: 26
PIF_VALUE: 26
PIF_VALUE: 23
PIF_VALUE: 30
PIF_VALUE: 33
PIF_VALUE: 32
PIF_VALUE: 24
PIF_VALUE: 26
PIF_VALUE: 30
PIF_VALUE: 26
PIF_VALUE: 29
PIF_VALUE: 30
PIF_VALUE: 33
PIF_VALUE: 33
PIF_VALUE: 28
PIF_VALUE: 27
PIF_VALUE: 29
PIF_VALUE: 23
PIF_VALUE: 26
PIF_VALUE: 26
PIF_VALUE: 27
PIF_VALUE: 30
PIF_VALUE: 30
PIF_VALUE: 26
PIF_VALUE: 30
PIF_VALUE: 27
PIF_VALUE: 31
PIF_VALUE: 27
PIF_VALUE: 26
PIF_VALUE: 33
PIF_VALUE: 30
PIF_VALUE: 25
PIF_VALUE: 26
PIF_VALUE: 24
PIF_VALUE: 31
PIF_VALUE: 26
PIF_VALUE: 33
PIF_VALUE: 31
PIF_VALUE: 25
PIF_VALUE: 30
PIF_VALUE: 34
PIF_VALUE: 14
PIF_VALUE: 21
PIF_VALUE: 31
PIF_VALUE: 26
PIF_VALUE: 25
PIF_VALUE: 26
PIF_VALUE: 14
PIF_VALUE: 25
PIF_VALUE: 31
PIF_VALUE: 26
PIF_VALUE: 4
PIF_VALUE: 26
PIF_VALUE: 24
PIF_VALUE: 34
PIF_VALUE: 31
PIF_VALUE: 26
PIF_VALUE: 26
PIF_VALUE: 28
PIF_VALUE: 26
PIF_VALUE: 31
PIF_VALUE: 30
PIF_VALUE: 26
PIF_VALUE: 27
PIF_VALUE: 26
PIF_VALUE: 31
PIF_VALUE: 26
PIF_VALUE: 31
PIF_VALUE: 30
PIF_VALUE: 27
PIF_VALUE: 25
PIF_VALUE: 26
PIF_VALUE: 26
PIF_VALUE: 30
PIF_VALUE: 28
PIF_VALUE: 34
PIF_VALUE: 26
PIF_VALUE: 26
PIF_VALUE: 23
PIF_VALUE: 26
PIF_VALUE: 33
PIF_VALUE: 27
PIF_VALUE: 25
PIF_VALUE: 26
PIF_VALUE: 26
PIF_VALUE: 29
PIF_VALUE: 35
PIF_VALUE: 24
PIF_VALUE: 29
PIF_VALUE: 29
PIF_VALUE: 28
PIF_VALUE: 31
PIF_VALUE: 27
PIF_VALUE: 32
PIF_VALUE: 27
PIF_VALUE: 26
PIF_VALUE: 26
PIF_VALUE: 32
PIF_VALUE: 24
PIF_VALUE: 27
PIF_VALUE: 27
PIF_VALUE: 29
PIF_VALUE: 28
PIF_VALUE: 30
PIF_VALUE: 25
PIF_VALUE: 26
PIF_VALUE: 32
PIF_VALUE: 33
PIF_VALUE: 29
PIF_VALUE: 26
PIF_VALUE: 29
PIF_VALUE: 26
PIF_VALUE: 26
PIF_VALUE: 30
PIF_VALUE: 31
PIF_VALUE: 29
PIF_VALUE: 33
PIF_VALUE: 25
PIF_VALUE: 30
PIF_VALUE: 26
PIF_VALUE: 27
PIF_VALUE: 26

## 2018-08-03 ASSESSMENT — PAIN SCALES - GENERAL: PAINLEVEL_OUTOF10: 10

## 2018-08-03 NOTE — PLAN OF CARE
Problem: RESPIRATORY  Goal: Provide mechanical and oxygen support to facilitate gas exchange  Outcome: Ongoing  Patient brought back from surgery on vent tolerating well.   Will continue to monitor patient

## 2018-08-03 NOTE — ANESTHESIA PRE PROCEDURE
Department of Anesthesiology  Preprocedure Note       Name:  Ramesh Gaona   Age:  61 y.o.  :  1955                                          MRN:  [id number]         Date:  2018      Surgeon: Ariane Astorga):  Leslie Porter MD    Procedure: Procedure(s):  LUMBAR LAMINECTOMY DECOMPRESSION POSTERIOR    Medications prior to admission:   Prior to Admission medications    Medication Sig Start Date End Date Taking? Authorizing Provider   DULoxetine (CYMBALTA) 20 MG extended release capsule Take 60 mg by mouth daily    Yes Historical Provider, MD   Fluticasone Furoate-Vilanterol (BREO ELLIPTA) 200-25 MCG/INH AEPB Inhale 1 applicator into the lungs daily    Yes Historical Provider, MD   gabapentin (NEURONTIN) 600 MG tablet Take 600 mg by mouth 3 times daily. .   Yes Historical Provider, MD   hydrOXYzine (VISTARIL) 25 MG capsule Take 25 mg by mouth nightly    Yes Historical Provider, MD   levothyroxine (SYNTHROID) 50 MCG tablet Take 50 mcg by mouth Daily   Yes Historical Provider, MD   omeprazole (PRILOSEC) 20 MG delayed release capsule Take 40 mg by mouth daily   Yes Historical Provider, MD   rOPINIRole (REQUIP) 0.5 MG tablet Take 0.5 mg by mouth 2 times daily    Yes Historical Provider, MD   oxyCODONE-acetaminophen (PERCOCET) 5-325 MG per tablet Take 1 tablet by mouth every 8 hours as needed for Pain. .   Yes Historical Provider, MD   albuterol (PROVENTIL) (2.5 MG/3ML) 0.083% nebulizer solution Take 2.5 mg by nebulization 4 times daily   Yes Historical Provider, MD   metFORMIN (GLUCOPHAGE) 500 MG tablet Take 500 mg by mouth nightly   Yes Historical Provider, MD       Current medications:    Current Facility-Administered Medications   Medication Dose Route Frequency Provider Last Rate Last Dose    DULoxetine (CYMBALTA) extended release capsule 60 mg  60 mg Oral Daily Edie José MD   60 mg at 18 0856    ondansetron (ZOFRAN) injection 4 mg  4 mg Intravenous Q6H PRN Edie José MD Phosphate injection 10 mg  10 mg Intravenous Q6H Graciela Brody MD   10 mg at 08/02/18 1800       Allergies: Allergies   Allergen Reactions    Crestor [Rosuvastatin Calcium]     Toradol [Ketorolac Tromethamine]        Problem List:    Patient Active Problem List   Diagnosis Code    Cord compression (HCC) G95.20    Acute myelopathy ([facility] [address] G95.9    Prostate cancer, primary, with metastasis from prostate to other site [facility]) C61       Past Medical History:        Diagnosis Date    Anxiety     Arthritis     Asthma     Cancer ([facility] [address]     prostate    COPD (chronic obstructive pulmonary disease) ([facility] [address]     Depression     Diabetes mellitus ([facility] [address]     GERD (gastroesophageal reflux disease)     Neuropathy ([facility] [address]     Pneumonia        Past Surgical History:        Procedure Laterality Date    CARPAL TUNNEL RELEASE Bilateral     CERVICAL DISCECTOMY      COLONOSCOPY      ENDOSCOPY, COLON, DIAGNOSTIC      EYE SURGERY      HERNIA REPAIR      LUMBAR SPINE SURGERY      NASAL SEPTUM SURGERY      PROSTATECTOMY         Social History:    Social History   Substance Use Topics    Smoking status: Former Smoker    Smokeless tobacco: Never Used    Alcohol use Yes      Comment: rarely                                Counseling given: Not Answered      Vital Signs (Current):   Vitals:    08/02/18 0501 08/02/18 0834 08/02/18 1139 08/02/18 1605   BP: (!) 132/58 119/64 120/63 121/75   Pulse: 94 92 88 100   Resp: 16 18 18 17   Temp: 98 °F (36.7 °C) 98.1 °F (36.7 °C) 97.7 °F (36.5 °C) 98 °F (36.7 °C)   TempSrc: Oral Oral Oral Oral   SpO2: 90% 92% 90% 91%   Weight:       Height:                                                  BP Readings from Last 3 Encounters:   08/02/18 121/75       NPO Status:                                                                                 BMI:   Wt Readings from Last 3 Encounters:   08/01/18 279 lb (126.6 kg)     Body mass index is 38.91 kg/m².     CBC:   Lab Results   Component Value Date    WBC 11.9 08/02/2018    RBC 4.88 08/02/2018    HGB 14.7 08/02/2018    HCT 45.0 08/02/2018    MCV 92.2 08/02/2018     08/02/2018       CMP:   Lab Results   Component Value Date     08/02/2018    K 4.2 08/02/2018     08/02/2018    CO2 25 08/02/2018    BUN 12 08/02/2018    CREATININE 0.6 08/02/2018    LABGLOM >90 08/02/2018    GLUCOSE 179 08/02/2018    PROT 7.4 08/02/2018    CALCIUM 9.7 08/02/2018    BILITOT 0.4 08/02/2018    ALKPHOS 197 08/02/2018    AST 33 08/02/2018    ALT 27 08/02/2018       POC Tests:   Recent Labs      08/02/18 2021   POCGLU  171*       Coags:   Lab Results   Component Value Date    INR 0.96 08/01/2018    APTT 29.0 08/01/2018       HCG (If Applicable): No results found for: PREGTESTUR, PREGSERUM, HCG, HCGQUANT     ABGs: No results found for: PHART, PO2ART, YMU1QKL, DFC3OEK, BEART, P1GRJWXK     Type & Screen (If Applicable):  Lab Results   Component Value Date    LABRH POS 08/02/2018       Anesthesia Evaluation    Airway: Mallampati: II       Mouth opening: > = 3 FB Dental:          Pulmonary:   (+) COPD:  sleep apnea:                             Cardiovascular:          ECG reviewed  Rhythm: regular                      Neuro/Psych:               GI/Hepatic/Renal:   (+) GERD:,           Endo/Other:    (+) Diabetes, . Abdominal:   (+) obese,         Vascular:                                        Anesthesia Plan      general     ASA 3       Induction: intravenous. Anesthetic plan and risks discussed with patient.                       Abimael Naidu MD   8/2/2018

## 2018-08-03 NOTE — PROGRESS NOTES
IM Progress Note  Dr. Nivia Burns  8/3/2018 3:49 PM      Patient name Terra White  [id number]  PCP: No primary care provider on file. Admit Date: 8/1/2018  Acct No. [de-identified]    Subjective: Interval History:   Seen at PACU  Pt still intubated but off vent   Very sleepy   Received *L of fluids and 1 unit of PRBC  Informed Had surgery from 11.30 pm last night until 14.30pm today      Diet: Diet NPO Effective Now    I/O last 3 completed shifts: In: 8368 [I.V.:8058; Blood:310]  Out: 2025 [Urine:775; Blood:1250]  I/O this shift: In: 500 [I.V.:500]  Out: -         Admission weight: 279 lb (126.6 kg) as of 8/1/2018  5:04 PM  Wt Readings from Last 3 Encounters:   08/01/18 279 lb (126.6 kg)     Body mass index is 38.91 kg/m².     ROS   Limited as still sedated      Medications:   Scheduled Meds:   midazolam HCl        midazolam  5 mg Intravenous Once    DULoxetine  60 mg Oral Daily    insulin lispro  0-6 Units Subcutaneous TID WC    insulin lispro  0-3 Units Subcutaneous Nightly    nystatin  5 mL Oral 4x Daily    bicalutamide  50 mg Oral Daily    Fluticasone Furoate-Vilanterol  1 applicator Inhalation Daily    gabapentin  600 mg Oral TID    levothyroxine  50 mcg Oral Daily    pantoprazole  40 mg Oral BID AC    insulin lispro  0-9 Units Subcutaneous Nightly    insulin glargine  10 Units Subcutaneous Daily    dexamethasone  10 mg Intravenous Q6H     Continuous Infusions:   dextrose      sodium chloride 75 mL/hr at 08/02/18 1100       Labs :     CBC:   Recent Labs      08/01/18   1727  08/02/18   0705  08/03/18   0421  08/03/18   1007  08/03/18   1311   WBC  7.4  11.9*  19.4*   --    --    HGB  14.2  14.7  12.1*  9.0*  9.9*   PLT  298  309  309   --    --      BMP:    Recent Labs      08/01/18   1727  08/02/18   0705   08/03/18   0602  08/03/18   1013  08/03/18   1311   NA  143  139   < >  143  141  141   K  3.9  4.2   < >  4.3  4.0  4.4   CL  101  102   --    --    --    --    CO2  28  25   -- --    --    --    BUN  11  12   --    --    --    --    CREATININE  0.8  0.6   --    --    --    --    GLUCOSE  120*  179*   --    --    --    --     < > = values in this interval not displayed. Hepatic:   Recent Labs      08/01/18   1727  08/02/18   0705   AST  45*  33   ALT  31  27   BILITOT  0.3  0.4   ALKPHOS  205*  197*     Troponin: No results for input(s): TROPONINI in the last 72 hours. BNP: No results for input(s): BNP in the last 72 hours.   Lipids:   Recent Labs      08/02/18   0705   CHOL  219*   HDL  61     INR:   Recent Labs      08/01/18   1727 08/03/18   0421   INR  0.96  1.01       Radiology    Objective:   Vitals: BP (!) 145/81   Pulse 108   Temp 99.2 °F (37.3 °C) (Temporal)   Resp 14   Ht 5' 11\" (1.803 m)   Wt 279 lb (126.6 kg)   SpO2 100%   BMI 38.91 kg/m²   HEENT: Head:pupils react  Neck: supple thick  Lungs: air exchange appreciated bilat  Heart: regular  rhythm tachycardic  Abdomen: Obese BS sluggish bruise on both sides of abd  Extremities: warm trace  edema  Neurologic:  sedated    Impression:   :   S/p spinal surgery for metastatic prostate cancer to the spine with acute cord compression and paraplegia  Prolonged effect of anesthesia  IRDM  Hypothyroidism  ED  COPD      Plan:    Close monitoring at ICU  D/w anesthesia and plan to leave him intubated in view of prolonged surgery received 8L fluids   Check CPK and renal function and f.u om Hb  Monitor chems   Strict I/O   Replace electrolytes   Once more awake PT OT    Jens Delgado MD

## 2018-08-03 NOTE — PROGRESS NOTES
1449- Pt to PACU. Hooked up to monitor. Report from Dr. Kecia Martin. NPT in place. Pt intubated, on 10 L O2. Noted blanchable redness/skin tears bilateral sides of abdomen and blanchable redness bilateral anterior shoulders. OR staff aware of skin issues. Pt unresponsive, vitals stable. +3 periorbital edema noted. Turned to assess back dressing, clean, dry, and intact. 1454- Medicated with 2 mg Lopressor per Dr. Kecia Martin for elevated BP.  1503- Pt not responding to commands, remains intubated, vitals stable. 1512- Dr. Kecia Martin back at bedside, ordered ABG's.  1520- Situated patient in bed. CHANDRIKA drains emptied. 1525- Respiratory at bedside for ABG's.  1530- Blood sugar 187. Informed Dr. Kecia Martin of blood sugar and ABG results. [id number]- Dr. Lawanna Cabot at bedside. 1541- BNP and CK drawn per Dr. Lawanna Cabot and sent to lab. 1545- Pt connected to ventilator. Respiratory at bedside. Pt opening eyes at times, at ease. 1548- Medicated with 5 mg Versed per Dr. Kecia Martin. 1552- Medicated with 30 mg Rocuronium per Dr Kecia Martin, who was at bedside. 1555- Called report to 4D RN, Manny Marrero. 1604- Pt unresponsive, on ventilator. Transported to 4D 14 via 2 RN's and Respiratory therapist on monitor in stable condition. Family not present in waiting room to notify. Noted yellow blister forming left lower chest, upper abdomen.

## 2018-08-03 NOTE — CONSULTS
Assessment and Plan:        1. Acute respiratory failure: Patient maintained on mechanical ventilator postoperatively. Fairly extensive volume resuscitation. Continue with mechanical ventilator overnight and look to extubate in the morning. 2. Paraplegia: Secondary to cord compression from tumor involving T7/T8 with fracture. Status post surgical debulking and repair 8/3/18. On high-dose steroids. 3. Metastatic prostate cancer: Previous surgical resection in 2014. 4. Diabetes mellitus: Subcutaneous insulin with sliding scale insulin. 5. COPD: Asthmatic component. On systemic steroids. Treat with as needed albuterol. 6. Hypothyroidism: On Synthroid. 7. Morbid obesity: Aware. 8. Obstructive sleep apnea: Recommend BiPAP at night postoperatively. 9. Leukocytosis: Likely related to high-dose steroids. CC:  Metastatic prostate cancer  HPI: The patient is a 60-year-old white male reformed smoker. He has a history of diabetes mellitus, COPD with asthmatic component, and hypothyroidism. He carries a diagnosis of sleep apnea. He has a history of prostate cancer that was diagnosed in 2014. He underwent prostatectomy in 2016. He had progressive back pain and had an MRI completed in May 2018 which showed degenerative disc disease. Pain continued to worsen and CT scan completed July 2018 showed sclerotic lesions and an MRI completed 7/26/18 showed extensive vertebral enhancing masses in the body of T7. This was associated with neuroforaminal stenosis compressing the nerve roots. Other enhancing lesions were noted at T6, and T8 through T12. Follow-up PSA was 292. He was started on systemic steroids. He was admitted on 8/1/18 for evaluation of possible surgery. He developed lower extremity weakness when he presented to the emergency room for admission. He had rapid neurologic decline with subsequent paralysis to the lower extremities bilaterally and had no pin prick sensation below the nipple. However, he had no incontinence. Patient had paraplegia secondary to cord compression secondary to tumor involvement of the spine at T8 with fracture. Patient underwent extensive surgical debridement and repair on 8/3/18. He was brought to the intensive care unit on mechanical ventilator support postoperatively. ROS: Sedated on mechanical ventilator. PMH:  Per HPI  SHX:  Reformed smoker  FHX: Positive for coronary artery disease, and diabetes mellitus. Allergies: Crestor and Toradol  Medications:  Decadron 10 mg IV every 6 hours. Cymbalta 60 mg a day. Flonase. Gabapentin 600 mg 3 times a day. Lantus 10 units subcutaneously once a day. Synthroid 50 µg a day. Protonix 40 mg a day. Vital Signs: T 98.2, pulse 91, respirations 20, blood pressure 103/57. Oxygen saturation is 100%. General:   Morbidly obese white male. Sedated on mechanical ventilator. HEENT:  normocephalic and atraumatic. No scleral icterus. Mild facial edema. Neck: supple. No JVD. Lungs: clear to auscultation. No retractions  Cardiac: RRR  Abdomen: soft. Nontender. Obese. Extremities:  No clubbing, cyanosis, or edema x 4. Vasculature: capillary refill < 3 seconds. Skin:  warm and dry. Psych:  Sedated on mechanical ventilator  Lymph:  No supraclavicular adenopathy. Neurologic:  Paraplegia    Data: (All radiographs, tracings, PFTs, and imaging are personally viewed and interpreted unless otherwise noted)    EKG shows normal sinus rhythm.  Chest x-ray shows poor inspiratory volume with basilar atelectasis. Spinal hardware noted.  Sodium 140, potassium 4.6, chloride 108, bicarb 18, BUN 23, creatinine one, glucose 198. CPK 1188. TSH 0.832. White blood cell count 19.4, hemoglobin 12.1, platelets 625. INR 1.01    CC time 40 minutes. Case discussed with Dr. Johann Clark and Dr. Linn Guerrero. Electronically signed by Demarcus Gatica M.D.

## 2018-08-04 ENCOUNTER — APPOINTMENT (OUTPATIENT)
Dept: GENERAL RADIOLOGY | Age: 63
DRG: 518 | End: 2018-08-04
Payer: MEDICARE

## 2018-08-04 LAB
ANION GAP SERPL CALCULATED.3IONS-SCNC: 12 MEQ/L (ref 8–16)
BASOPHILS # BLD: 0.1 %
BASOPHILS ABSOLUTE: 0 THOU/MM3 (ref 0–0.1)
BUN BLDV-MCNC: 31 MG/DL (ref 7–22)
CALCIUM SERPL-MCNC: 7.9 MG/DL (ref 8.5–10.5)
CHLORIDE BLD-SCNC: 111 MEQ/L (ref 98–111)
CO2: 18 MEQ/L (ref 23–33)
CREAT SERPL-MCNC: 0.9 MG/DL (ref 0.4–1.2)
EOSINOPHIL # BLD: 0 %
EOSINOPHILS ABSOLUTE: 0 THOU/MM3 (ref 0–0.4)
ERYTHROCYTE [DISTWIDTH] IN BLOOD BY AUTOMATED COUNT: 13.8 % (ref 11.5–14.5)
ERYTHROCYTE [DISTWIDTH] IN BLOOD BY AUTOMATED COUNT: 48.6 FL (ref 35–45)
GFR SERPL CREATININE-BSD FRML MDRD: 85 ML/MIN/1.73M2
GLUCOSE BLD-MCNC: 206 MG/DL (ref 70–108)
GLUCOSE BLD-MCNC: 207 MG/DL (ref 70–108)
GLUCOSE BLD-MCNC: 230 MG/DL (ref 70–108)
GLUCOSE BLD-MCNC: 320 MG/DL (ref 70–108)
HCT VFR BLD CALC: 28.6 % (ref 42–52)
HEMOGLOBIN: 9 GM/DL (ref 14–18)
IMMATURE GRANS (ABS): 0.06 THOU/MM3 (ref 0–0.07)
IMMATURE GRANULOCYTES: 0.5 %
LYMPHOCYTES # BLD: 6.3 %
LYMPHOCYTES ABSOLUTE: 0.7 THOU/MM3 (ref 1–4.8)
MCH RBC QN AUTO: 30.2 PG (ref 26–33)
MCHC RBC AUTO-ENTMCNC: 31.5 GM/DL (ref 32.2–35.5)
MCV RBC AUTO: 96 FL (ref 80–94)
MONOCYTES # BLD: 10.1 %
MONOCYTES ABSOLUTE: 1.2 THOU/MM3 (ref 0.4–1.3)
NUCLEATED RED BLOOD CELLS: 0 /100 WBC
PLATELET # BLD: 188 THOU/MM3 (ref 130–400)
PMV BLD AUTO: 10.9 FL (ref 9.4–12.4)
POTASSIUM SERPL-SCNC: 4.1 MEQ/L (ref 3.5–5.2)
RBC # BLD: 2.98 MILL/MM3 (ref 4.7–6.1)
SEG NEUTROPHILS: 83 %
SEGMENTED NEUTROPHILS ABSOLUTE COUNT: 9.6 THOU/MM3 (ref 1.8–7.7)
SODIUM BLD-SCNC: 141 MEQ/L (ref 135–145)
TOTAL CK: 1075 U/L (ref 55–170)
WBC # BLD: 11.6 THOU/MM3 (ref 4.8–10.8)

## 2018-08-04 PROCEDURE — 6370000000 HC RX 637 (ALT 250 FOR IP): Performed by: INTERNAL MEDICINE

## 2018-08-04 PROCEDURE — 2000000000 HC ICU R&B

## 2018-08-04 PROCEDURE — 82948 REAGENT STRIP/BLOOD GLUCOSE: CPT

## 2018-08-04 PROCEDURE — 2500000003 HC RX 250 WO HCPCS: Performed by: INTERNAL MEDICINE

## 2018-08-04 PROCEDURE — C9113 INJ PANTOPRAZOLE SODIUM, VIA: HCPCS | Performed by: INTERNAL MEDICINE

## 2018-08-04 PROCEDURE — C1751 CATH, INF, PER/CENT/MIDLINE: HCPCS

## 2018-08-04 PROCEDURE — 82550 ASSAY OF CK (CPK): CPT

## 2018-08-04 PROCEDURE — 36415 COLL VENOUS BLD VENIPUNCTURE: CPT

## 2018-08-04 PROCEDURE — 2700000000 HC OXYGEN THERAPY PER DAY

## 2018-08-04 PROCEDURE — 6360000002 HC RX W HCPCS

## 2018-08-04 PROCEDURE — 99024 POSTOP FOLLOW-UP VISIT: CPT | Performed by: NEUROLOGICAL SURGERY

## 2018-08-04 PROCEDURE — 6360000002 HC RX W HCPCS: Performed by: INTERNAL MEDICINE

## 2018-08-04 PROCEDURE — 94640 AIRWAY INHALATION TREATMENT: CPT

## 2018-08-04 PROCEDURE — 2580000003 HC RX 258: Performed by: INTERNAL MEDICINE

## 2018-08-04 PROCEDURE — 6370000000 HC RX 637 (ALT 250 FOR IP): Performed by: PHYSICIAN ASSISTANT

## 2018-08-04 PROCEDURE — 2709999900 HC NON-CHARGEABLE SUPPLY

## 2018-08-04 PROCEDURE — 36556 INSERT NON-TUNNEL CV CATH: CPT

## 2018-08-04 PROCEDURE — 80048 BASIC METABOLIC PNL TOTAL CA: CPT

## 2018-08-04 PROCEDURE — 71045 X-RAY EXAM CHEST 1 VIEW: CPT

## 2018-08-04 PROCEDURE — 02HV33Z INSERTION OF INFUSION DEVICE INTO SUPERIOR VENA CAVA, PERCUTANEOUS APPROACH: ICD-10-PCS | Performed by: INTERNAL MEDICINE

## 2018-08-04 PROCEDURE — 2500000003 HC RX 250 WO HCPCS

## 2018-08-04 PROCEDURE — 85025 COMPLETE CBC W/AUTO DIFF WBC: CPT

## 2018-08-04 PROCEDURE — 94003 VENT MGMT INPAT SUBQ DAY: CPT

## 2018-08-04 RX ORDER — PANTOPRAZOLE SODIUM 40 MG/10ML
40 INJECTION, POWDER, LYOPHILIZED, FOR SOLUTION INTRAVENOUS DAILY
Status: DISCONTINUED | OUTPATIENT
Start: 2018-08-04 | End: 2018-08-06

## 2018-08-04 RX ORDER — INSULIN GLARGINE 100 [IU]/ML
15 INJECTION, SOLUTION SUBCUTANEOUS DAILY
Status: DISCONTINUED | OUTPATIENT
Start: 2018-08-05 | End: 2018-08-05

## 2018-08-04 RX ORDER — FENTANYL CITRATE 50 UG/ML
50 INJECTION, SOLUTION INTRAMUSCULAR; INTRAVENOUS
Status: DISCONTINUED | OUTPATIENT
Start: 2018-08-04 | End: 2018-08-16 | Stop reason: SDUPTHER

## 2018-08-04 RX ORDER — DEXAMETHASONE SODIUM PHOSPHATE 4 MG/ML
4 INJECTION, SOLUTION INTRA-ARTICULAR; INTRALESIONAL; INTRAMUSCULAR; INTRAVENOUS; SOFT TISSUE EVERY 6 HOURS
Status: DISCONTINUED | OUTPATIENT
Start: 2018-08-04 | End: 2018-08-05

## 2018-08-04 RX ORDER — IPRATROPIUM BROMIDE AND ALBUTEROL SULFATE 2.5; .5 MG/3ML; MG/3ML
1 SOLUTION RESPIRATORY (INHALATION)
Status: DISCONTINUED | OUTPATIENT
Start: 2018-08-04 | End: 2018-08-18 | Stop reason: ALTCHOICE

## 2018-08-04 RX ORDER — MIDAZOLAM HYDROCHLORIDE 1 MG/ML
INJECTION INTRAMUSCULAR; INTRAVENOUS
Status: COMPLETED
Start: 2018-08-04 | End: 2018-08-04

## 2018-08-04 RX ORDER — FENTANYL CITRATE 50 UG/ML
100 INJECTION, SOLUTION INTRAMUSCULAR; INTRAVENOUS ONCE
Status: COMPLETED | OUTPATIENT
Start: 2018-08-04 | End: 2018-08-04

## 2018-08-04 RX ORDER — SODIUM CHLORIDE, SODIUM LACTATE, POTASSIUM CHLORIDE, CALCIUM CHLORIDE 600; 310; 30; 20 MG/100ML; MG/100ML; MG/100ML; MG/100ML
1000 INJECTION, SOLUTION INTRAVENOUS ONCE
Status: COMPLETED | OUTPATIENT
Start: 2018-08-04 | End: 2018-08-04

## 2018-08-04 RX ORDER — INSULIN GLARGINE 100 [IU]/ML
5 INJECTION, SOLUTION SUBCUTANEOUS ONCE
Status: COMPLETED | OUTPATIENT
Start: 2018-08-04 | End: 2018-08-04

## 2018-08-04 RX ORDER — MIDAZOLAM HYDROCHLORIDE 1 MG/ML
5 INJECTION INTRAMUSCULAR; INTRAVENOUS ONCE
Status: COMPLETED | OUTPATIENT
Start: 2018-08-04 | End: 2018-08-04

## 2018-08-04 RX ADMIN — DEXMEDETOMIDINE HYDROCHLORIDE 0.4 MCG/KG/HR: 100 INJECTION, SOLUTION INTRAVENOUS at 09:58

## 2018-08-04 RX ADMIN — IPRATROPIUM BROMIDE AND ALBUTEROL SULFATE 1 AMPULE: .5; 3 SOLUTION RESPIRATORY (INHALATION) at 13:01

## 2018-08-04 RX ADMIN — MIDAZOLAM HYDROCHLORIDE 5 MG: 1 INJECTION INTRAMUSCULAR; INTRAVENOUS at 09:40

## 2018-08-04 RX ADMIN — NYSTATIN 500000 UNITS: 100000 SUSPENSION ORAL at 16:47

## 2018-08-04 RX ADMIN — PANTOPRAZOLE SODIUM 40 MG: 40 INJECTION, POWDER, FOR SOLUTION INTRAVENOUS at 10:18

## 2018-08-04 RX ADMIN — SODIUM CHLORIDE, POTASSIUM CHLORIDE, SODIUM LACTATE AND CALCIUM CHLORIDE: 600; 310; 30; 20 INJECTION, SOLUTION INTRAVENOUS at 20:09

## 2018-08-04 RX ADMIN — MORPHINE SULFATE 2 MG: 2 INJECTION, SOLUTION INTRAMUSCULAR; INTRAVENOUS at 01:53

## 2018-08-04 RX ADMIN — DEXAMETHASONE SODIUM PHOSPHATE 10 MG: 4 INJECTION, SOLUTION INTRA-ARTICULAR; INTRALESIONAL; INTRAMUSCULAR; INTRAVENOUS; SOFT TISSUE at 01:26

## 2018-08-04 RX ADMIN — INSULIN GLARGINE 5 UNITS: 100 INJECTION, SOLUTION SUBCUTANEOUS at 10:45

## 2018-08-04 RX ADMIN — HYDROCODONE BITARTRATE AND ACETAMINOPHEN 1 TABLET: 10; 325 TABLET ORAL at 03:11

## 2018-08-04 RX ADMIN — INSULIN LISPRO 4 UNITS: 100 INJECTION, SOLUTION INTRAVENOUS; SUBCUTANEOUS at 16:58

## 2018-08-04 RX ADMIN — FENTANYL CITRATE 50 MCG: 50 INJECTION INTRAMUSCULAR; INTRAVENOUS at 12:44

## 2018-08-04 RX ADMIN — DEXMEDETOMIDINE HYDROCHLORIDE 0.5 MCG/KG/HR: 100 INJECTION, SOLUTION INTRAVENOUS at 15:53

## 2018-08-04 RX ADMIN — SODIUM CHLORIDE, POTASSIUM CHLORIDE, SODIUM LACTATE AND CALCIUM CHLORIDE: 600; 310; 30; 20 INJECTION, SOLUTION INTRAVENOUS at 01:25

## 2018-08-04 RX ADMIN — GABAPENTIN 600 MG: 600 TABLET, FILM COATED ORAL at 10:33

## 2018-08-04 RX ADMIN — INSULIN GLARGINE 10 UNITS: 100 INJECTION, SOLUTION SUBCUTANEOUS at 10:10

## 2018-08-04 RX ADMIN — SODIUM CHLORIDE, POTASSIUM CHLORIDE, SODIUM LACTATE AND CALCIUM CHLORIDE: 600; 310; 30; 20 INJECTION, SOLUTION INTRAVENOUS at 13:10

## 2018-08-04 RX ADMIN — FENTANYL CITRATE 50 MCG: 50 INJECTION INTRAMUSCULAR; INTRAVENOUS at 07:29

## 2018-08-04 RX ADMIN — NYSTATIN 500000 UNITS: 100000 SUSPENSION ORAL at 12:51

## 2018-08-04 RX ADMIN — FENTANYL CITRATE 50 MCG: 50 INJECTION INTRAMUSCULAR; INTRAVENOUS at 14:56

## 2018-08-04 RX ADMIN — Medication 2 MCG/MIN: at 10:38

## 2018-08-04 RX ADMIN — LEVOTHYROXINE SODIUM 50 MCG: 50 TABLET ORAL at 10:02

## 2018-08-04 RX ADMIN — SODIUM CHLORIDE, POTASSIUM CHLORIDE, SODIUM LACTATE AND CALCIUM CHLORIDE 1000 ML: 600; 310; 30; 20 INJECTION, SOLUTION INTRAVENOUS at 03:46

## 2018-08-04 RX ADMIN — NYSTATIN 500000 UNITS: 100000 SUSPENSION ORAL at 10:27

## 2018-08-04 RX ADMIN — FENTANYL CITRATE 100 MCG: 50 INJECTION INTRAMUSCULAR; INTRAVENOUS at 09:40

## 2018-08-04 RX ADMIN — INSULIN LISPRO 2 UNITS: 100 INJECTION, SOLUTION INTRAVENOUS; SUBCUTANEOUS at 13:00

## 2018-08-04 RX ADMIN — NYSTATIN 500000 UNITS: 100000 SUSPENSION ORAL at 22:10

## 2018-08-04 RX ADMIN — GABAPENTIN 600 MG: 600 TABLET, FILM COATED ORAL at 22:10

## 2018-08-04 RX ADMIN — GABAPENTIN 600 MG: 600 TABLET, FILM COATED ORAL at 14:58

## 2018-08-04 RX ADMIN — DEXAMETHASONE SODIUM PHOSPHATE 4 MG: 4 INJECTION, SOLUTION INTRA-ARTICULAR; INTRALESIONAL; INTRAMUSCULAR; INTRAVENOUS; SOFT TISSUE at 12:49

## 2018-08-04 RX ADMIN — BICALUTAMIDE 50 MG: 50 TABLET, FILM COATED ORAL at 10:27

## 2018-08-04 RX ADMIN — DEXAMETHASONE SODIUM PHOSPHATE 10 MG: 4 INJECTION, SOLUTION INTRA-ARTICULAR; INTRALESIONAL; INTRAMUSCULAR; INTRAVENOUS; SOFT TISSUE at 08:45

## 2018-08-04 RX ADMIN — FENTANYL CITRATE 50 MCG: 50 INJECTION INTRAMUSCULAR; INTRAVENOUS at 16:45

## 2018-08-04 RX ADMIN — DEXAMETHASONE SODIUM PHOSPHATE 4 MG: 4 INJECTION, SOLUTION INTRA-ARTICULAR; INTRALESIONAL; INTRAMUSCULAR; INTRAVENOUS; SOFT TISSUE at 17:06

## 2018-08-04 RX ADMIN — IPRATROPIUM BROMIDE AND ALBUTEROL SULFATE 1 AMPULE: .5; 3 SOLUTION RESPIRATORY (INHALATION) at 16:47

## 2018-08-04 RX ADMIN — INSULIN LISPRO 2 UNITS: 100 INJECTION, SOLUTION INTRAVENOUS; SUBCUTANEOUS at 10:11

## 2018-08-04 RX ADMIN — IPRATROPIUM BROMIDE AND ALBUTEROL SULFATE 1 AMPULE: .5; 3 SOLUTION RESPIRATORY (INHALATION) at 19:54

## 2018-08-04 RX ADMIN — DEXMEDETOMIDINE HYDROCHLORIDE 0.5 MCG/KG/HR: 100 INJECTION, SOLUTION INTRAVENOUS at 20:09

## 2018-08-04 RX ADMIN — MIDAZOLAM 5 MG: 1 INJECTION INTRAMUSCULAR; INTRAVENOUS at 09:40

## 2018-08-04 ASSESSMENT — PULMONARY FUNCTION TESTS
PIF_VALUE: 21
PIF_VALUE: 20
PIF_VALUE: 22
PIF_VALUE: 21

## 2018-08-04 ASSESSMENT — PAIN SCALES - GENERAL
PAINLEVEL_OUTOF10: 6
PAINLEVEL_OUTOF10: 7
PAINLEVEL_OUTOF10: 0
PAINLEVEL_OUTOF10: 10
PAINLEVEL_OUTOF10: 5
PAINLEVEL_OUTOF10: 2

## 2018-08-04 NOTE — PROGRESS NOTES
08/03/18  5:01 PM   Result Value Ref Range    Glucose, Whole Blood 212 (H) 70 - 108 mg/dl   Blood Gas, Arterial    Collection Time: 08/03/18  5:01 PM   Result Value Ref Range    pH, Blood Gas 7.36 7.35 - 7.45    PCO2 31 (L) 35 - 45 mmhg    PO2 178 (H) 71 - 104 mmhg    HCO3 17 (L) 23 - 28 mmol/l    Base Excess (Calculated) -7.3 (L) -2.5 - 2.5 mmol/l    O2 Sat 100 %    IFIO2 50     DEVICE Adult Vent     Mode AC     SET RESPIRATORY RATE 16 bpm    SET TIDAL VOLUME 700 ml    SET PEEP 5.0 mmhg    Tho Test N/A     Source:  Art Line     COLLECTED BY: [id number]    POCT Glucose    Collection Time: 08/03/18  5:03 PM   Result Value Ref Range    POC Glucose 182 (H) 70 - 108 mg/dl   Basic Metabolic Panel    Collection Time: 08/04/18  3:44 AM   Result Value Ref Range    Sodium 141 135 - 145 meq/L    Potassium 4.1 3.5 - 5.2 meq/L    Chloride 111 98 - 111 meq/L    CO2 18 (L) 23 - 33 meq/L    Glucose 207 (H) 70 - 108 mg/dL    BUN 31 (H) 7 - 22 mg/dL    CREATININE 0.9 0.4 - 1.2 mg/dL    Calcium 7.9 (L) 8.5 - 10.5 mg/dL   CBC auto differential    Collection Time: 08/04/18  3:44 AM   Result Value Ref Range    WBC 11.6 (H) 4.8 - 10.8 thou/mm3    RBC 2.98 (L) 4.70 - 6.10 mill/mm3    Hemoglobin 9.0 (L) 14.0 - 18.0 gm/dl    Hematocrit 28.6 (L) 42.0 - 52.0 %    MCV 96.0 (H) 80.0 - 94.0 fL    MCH 30.2 26.0 - 33.0 pg    MCHC 31.5 (L) 32.2 - 35.5 gm/dl    RDW-CV 13.8 11.5 - 14.5 %    RDW-SD 48.6 (H) 35.0 - 45.0 fL    Platelets 915 576 - 238 thou/mm3    MPV 10.9 9.4 - 12.4 fL    Seg Neutrophils 83.0 %    Lymphocytes 6.3 %    Monocytes 10.1 %    Eosinophils 0.0 %    Basophils 0.1 %    Immature Granulocytes 0.5 %    Segs Absolute 9.6 (H) 1.8 - 7.7 thou/mm3    Lymphocytes # 0.7 (L) 1.0 - 4.8 thou/mm3    Monocytes # 1.2 0.4 - 1.3 thou/mm3    Eosinophils # 0.0 0.0 - 0.4 thou/mm3    Basophils # 0.0 0.0 - 0.1 thou/mm3    Immature Grans (Abs) 0.06 0.00 - 0.07 thou/mm3    nRBC 0 /100 wbc   CK    Collection Time: 08/04/18  3:44 AM   Result Value Ref Range Total CK 1,075 (H) 55 - 170 U/L   Anion Gap    Collection Time: 08/04/18  3:44 AM   Result Value Ref Range    Anion Gap 12.0 8.0 - 16.0 meq/L   Glomerular Filtration Rate, Estimated    Collection Time: 08/04/18  3:44 AM   Result Value Ref Range    Est, Glom Filt Rate 85 (A) ml/min/1.73m2       Radiology:     Ct Abdomen Pelvis W Wo Contrast Additional Contrast? Radiologist Recommendation    Result Date: 8/1/2018  PROCEDURE: CT ABDOMEN PELVIS W WO CONTRAST CLINICAL INFORMATION: acute cord compression r/o mets . COMPARISON: None. TECHNIQUE: 5 mm axial CT images were obtained through the abdomen and pelvis before and after the administration of intravenous and oral contrast. Coronal and sagittal reconstructions were obtained. All CT scans at this facility use dose modulation, iterative reconstruction, and/or weight-based dosing when appropriate to reduce radiation dose to as low as reasonably achievable. FINDINGS:  There is basilar fibrolinear scarring left greater than right. The noncontrast appearance of the cardiac chambers, gallbladder, pancreas and spleen are negative for active pathology with mild fatty replacement of the liver as well as pancreas noted. There is no biliary obstruction. Left and right kidney demonstrate nonobstructing punctate calculi. There is trace contrast retained from previous images, correlate with mild perinephric stranding associated with nephritis. The small bowel and colon are negative for gross obstruction or inflammatory wall changes. A few scattered colonic diverticula changes are present. The ventral abdominal wall image 50 postsurgical mesh from hernia repair near the umbilicus. There is contrast within the urinary bladder. Postsurgical instrumentation of the lumbar sacral spine are present with disc graft fusion at L3, L4-5, and L5-S1. The T8 sclerotic lesion with compression deformities are again noted. Metastatic changes again are suspected.  Additional sclerotic foci within several vertebral of the lower thoracic and lumbar spine persists considered with metastatic change to the skeleton. 1. T8 vertebral pathologic appearing fracture with sclerosis suggesting metastatic changes of the skeleton with additional lesions within the lower thoracic and lumbar segments. No obvious high-grade stenosis of the central canal visualized. Chronic degenerative changes of lumbar spine are visualized. Postsurgical changes of the ventral abdominal wall with visualized periumbilical mesh. Minimal colonic diverticulosis. **This report has been created using voice recognition software. It may contain minor errors which are inherent in voice recognition technology. ** Final report electronically signed by Dr. Cris Alvarado on 8/1/2018 11:41 PM    Ct Chest W Wo Contrast    Result Date: 8/1/2018  PROCEDURE: CT CHEST W WO CONTRAST CLINICAL INFORMATION: prostate cancer r/o mets. COMPARISON: No prior study. TECHNIQUE: 5 mm axial images were obtained through the chest before and after the administration of intravenous contrast. Sagittal and coronal reconstructions were obtained. All CT scans at this facility use dose modulation, iterative reconstruction, and/or weight-based dosing when appropriate to reduce radiation dose to as low as reasonably achievable. FINDINGS: Coarse markings are visualized at the lung bases with fibrolinear scarring the left lower lobe seen. Calcified granuloma changes are present. There is no acute venous congestion or pneumothorax. Degenerative skeletal changes are present with sclerosis of  the mid lower thoracic vertebral bodies near the T8 level. Sclerotic appearance of C7 and portions of T10 and T11 are also noted correlation with metastatic changes the etiologies such as prostate or renal are There is no aneurysm present. The mediastinum is negative for pathologic adenopathy. There is no pneumothorax seen. 1. Minimal fibrolinear scarring lung bases.  2. Vertebral deformity the posterior paraspinal region correlate with chronicity or acuity of surgical procedure. There is no acute central canal  impingement or vertebral abnormality of the lumbar segment. There is normal tapering of the conus at T12-L1 Marrow signal at L2 as visualized raises concern for lesions related to metastases without pathologic fracture. Changes of T12 and T11 are also considered Disc levels: L1-2: The disc space is narrow. The central canal and foramen remain normal. L2-3: There is desiccation thinning of the disc with minimal posterior disc bulge without acute central canal impingement. The foramen are negative for impingement L3-4: There is an intervertebral disc graft. The central canal is intact. There is mild lateral recess narrowing without significant foraminal impingement L4-5: There is intervertebral disc graft without acute central canal impingement. The foramen are negative for significant stenosis L5-S1: There is an intervertebral disc graft without central canal narrowing. The foramen are patent. 1. Postsurgical instrumentation and changes of L3-S1 with posterior paraspinal soft tissue changes correlate with chronicity or acuity of recent surgery. 2. Skeletal marrow signal changes concerning for metastases at L2 and T12 partially visualized. 3. Degenerative changes of the lumbar spine segments described above at individual levels without acute high-grade impingement of the central canal structures **This report has been created using voice recognition software. It may contain minor errors which are inherent in voice recognition technology. ** Final report electronically signed by Dr. Joaquin Andres on 8/2/2018 1:41 AM    Mri Brain W Wo Contrast    Result Date: 8/2/2018  PROCEDURE: MRI BRAIN W WO CONTRAST CLINICAL INFORMATIONprostate cancer vertebral metastases. COMPARISON: No prior study.  TECHNIQUE: Multiplanar and multiple spin echo T1 and T2-weighted images were obtained through the brain before and after the administration of intravenous contrast. FINDINGS: The diffusion-weighted images are normal. The brain volume is compatible with age-related changes. There are no intra-or extra-axial collections. There is no hydrocephalus, midline shift or mass effect. On the FLAIR and T2-weighted sequences, there is normal signal intensity in the brain. On the gradient echo T2-weighted images, there is mineralization in the medial aspects of the basal ganglia. No other areas of susceptibility artifact are present. There is no abnormal enhancement in the brain. The major intracranial vascular flow voids are present. The midline craniocervical junction structures are normal.  The brainstem and pituitary gland are normal.     1. Generalized mild cerebral atrophy. No active pathology present. **This report has been created using voice recognition software. It may contain minor errors which are inherent in voice recognition technology. ** Final report electronically signed by Dr. Lee Everett on 8/2/2018 12:15 AM    CT C-Spine:      Impression       1. Sclerotic metastasis in the inferior anterior aspect of C7.   2. Solid osseous fusion of C4-C7.            CT - THORACIC SPINE      Impression       1. Pathologic fracture of T8. This is pathologic secondary to underlying sclerotic metastasis. There is known extent in the posterior elements and spinal canal. This is better demonstrated on MR.       2. Additional sclerotic metastases are also present in T7 and T9. Additional metastases are present in the lower thoracic and lumbar lumbar bodies. The metastases are better seen on MRI.       CT LUMBAR SPINE :      Sclerotic metastases are present in T11, T12, L2, L4 and L5 vertebral bodies. No pathologic fractures are noted.         EKG NSR, Low voltage QRS, Non specific ST abnormality.      ASSESMENT:      Active Problems:    Cord compression (HCC)    Acute myelopathy (HCC)    Prostate cancer, primary, with metastasis from prostate to other site [facility])  Resolved Problems:    * No resolved hospital problems. *     IMPRESSION:  1. Acute spinal cord compression , with paraplegia  2. 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 3.  Insulin-requiring diabetes mellitus. 4.  Hypothyroidism. 5.  COPD  6. History of obstructive sleep apnea. 7.  Obesity. 8.  History of nicotine use.     RECOMMENDATIONS:  Continue ICU monitoring,  in view of prolonged surgery / intubation, he had received 8L fluids during surgery  CPK elevated trend cpk, check urine myoglobin, continue IVF,  monitor renal function and     Hb , Strict I/O , replace electrolytes per protocol   Once more awake PT OT   Monitor his chems with insulin coverage. Resumeed home medications as appropriate.   Pain control           DVT prophylaxis: [] Lovenox                                 [x] SCDs                                 [] SQ Heparin                                 [] Encourage ambulation, low risk for DVT, no chemical or mechanical prophylaxis necessary              [] Already on Anticoagulation                Anticipated Disposition upon discharge: [] Home                                                                         [] Home with Home Health                                                                         [] [facility]                                                                         [] [address]          Electronically signed by Ismael Aragon MD on 8/4/2018 at 7:47 AM

## 2018-08-04 NOTE — FLOWSHEET NOTE
08/03/18 2120   Encounter Summary   Services provided to: Patient   Referral/Consult From: [address] [facility] Family members;Spouse   Continue Visiting Yes  (8/3 continue support)   Complexity of Encounter Low   Length of Encounter 15 minutes   Routine   Type Initial   Spiritual/Jain   Type Spiritual support   Assessment Unable to respond   Intervention Nurtured hope   Outcome Coping     Pt not able to respond. Nurse was with pt and shared that spouse had just left. Nurse shared pt had been sedated most of day. Per nurse, plans are to remove tube. Informed nurse that [facility] is available if needed.

## 2018-08-04 NOTE — PROGRESS NOTES
0930 Pt. agitated thrashing in bed with versed gtt infusing. Pt pulled out ID.  [id number] Dr. Terence Jacobs to bedside medicated per order via IM injection, CVC placed. 1000  Placement verified via XRAY  1030 gtts infusing per MD order. Pt resting comfortably.

## 2018-08-04 NOTE — OP NOTE
to the vertebral body, first on the right  and then on the left. We then identified the segmental nerve root that was sectioned bilaterally  after placing hemoclip on its dural take off. In this way we had good exposure of the techal sac. It was evident that there was tumor involving the vertebral body also extending laterally from the body. We proceeded to identify the superior and inferior disk space; to enter themt using a 15 bladed knife and to remove the disk using a combination of curettes, pituitary and spreaders, removing the disk and the cartilagineous end plate. At that stage we concentrated our attention on removing the vertebral body and the anterior epidural tumor. The epidural tumor was removed using the CUSA accessing it bilaterally while the vertebral body was removed using high speed drill, small curved osteotome and various size bony curettes. The dural sac was slightly supported using Toano elevator and at times  using nerve root retractors. While removing  the midline anterior compressing tumor that was tightly adherent to the dura there was a small egress of CSF coming from a midline anterior dural tear. A cottonoid was placed over it and tumor removal continued. Once we were satisfied that we had removed all the compressing tumor and that the remaining vertebral body appeared solid we evaluated the cavity created for the insertion of an expandable cage; we came to the conclusion that the cage could not be fitted in properly in that space. We were satisfied that there was an excellent anterior decompression of the spinal cord. We then harvested a piece of fat from the exposed wound and loosely wrapped it around the dura making sure that it made good contact with the dural tear anteriorly. We secured it in place with Tisseel. We then obtained a BodyTom scan that confirmed the decompression and good placement of the pedicle screws.   We then applied a small piece of Duragen held in place by Tisseel on the exposed dura. Next we removed the small bony fiducials and placed  two 20 cm rods  on the screw heads and tightly secured the construct. Two 30-35 mm cross links were applied to the construct. After roughening up the exposed posterior elements of the spine we inserted Osteoinductive-Osteoconductive (Vitoss) material over the exposed elements of the spine, placed  two 7 mm J-P drain in the subfascial plane and proceeded to close the wound as per routine. EBL was about 600 cc  Counts were correct. SSEP/MEP remained stable in the upper extremities while decreased and disappeared in the lower extremities during the procedure, not surprisingly considering the profound neurological deficit ( functional paraplegia) exhibited by the patient. Brijesh Quiet EMG was quiet.

## 2018-08-05 ENCOUNTER — APPOINTMENT (OUTPATIENT)
Dept: GENERAL RADIOLOGY | Age: 63
DRG: 518 | End: 2018-08-05
Payer: MEDICARE

## 2018-08-05 LAB
ALBUMIN SERPL-MCNC: 2.8 G/DL (ref 3.5–5.1)
ALLEN TEST: POSITIVE
ALP BLD-CCNC: 141 U/L (ref 38–126)
ALT SERPL-CCNC: 12 U/L (ref 11–66)
ANION GAP SERPL CALCULATED.3IONS-SCNC: 11 MEQ/L (ref 8–16)
AST SERPL-CCNC: 20 U/L (ref 5–40)
BASE EXCESS (CALCULATED): 0.1 MMOL/L (ref -2.5–2.5)
BASOPHILS # BLD: 0.1 %
BASOPHILS ABSOLUTE: 0 THOU/MM3 (ref 0–0.1)
BILIRUB SERPL-MCNC: 0.4 MG/DL (ref 0.3–1.2)
BUN BLDV-MCNC: 33 MG/DL (ref 7–22)
CALCIUM SERPL-MCNC: 8.1 MG/DL (ref 8.5–10.5)
CHLORIDE BLD-SCNC: 107 MEQ/L (ref 98–111)
CO2: 23 MEQ/L (ref 23–33)
COLLECTED BY:: 4648
CREAT SERPL-MCNC: 0.7 MG/DL (ref 0.4–1.2)
DEVICE: NORMAL
EOSINOPHIL # BLD: 0 %
EOSINOPHILS ABSOLUTE: 0 THOU/MM3 (ref 0–0.4)
ERYTHROCYTE [DISTWIDTH] IN BLOOD BY AUTOMATED COUNT: 13.3 % (ref 11.5–14.5)
ERYTHROCYTE [DISTWIDTH] IN BLOOD BY AUTOMATED COUNT: 44.4 FL (ref 35–45)
GFR SERPL CREATININE-BSD FRML MDRD: > 90 ML/MIN/1.73M2
GLUCOSE BLD-MCNC: 264 MG/DL (ref 70–108)
GLUCOSE BLD-MCNC: 267 MG/DL (ref 70–108)
GLUCOSE BLD-MCNC: 289 MG/DL (ref 70–108)
GLUCOSE BLD-MCNC: 290 MG/DL (ref 70–108)
GLUCOSE, WHOLE BLOOD: 208 MG/DL (ref 70–108)
GLUCOSE, WHOLE BLOOD: 231 MG/DL (ref 70–108)
GLUCOSE, WHOLE BLOOD: 247 MG/DL (ref 70–108)
GLUCOSE, WHOLE BLOOD: 276 MG/DL (ref 70–108)
HCO3: 24 MMOL/L (ref 23–28)
HCT VFR BLD CALC: 23 % (ref 42–52)
HCT VFR BLD CALC: 23.6 % (ref 42–52)
HEMOGLOBIN: 7.7 GM/DL (ref 14–18)
HEMOGLOBIN: 7.7 GM/DL (ref 14–18)
IFIO2: 40
IMMATURE GRANS (ABS): 0.21 THOU/MM3 (ref 0–0.07)
IMMATURE GRANULOCYTES: 1.5 %
LYMPHOCYTES # BLD: 9 %
LYMPHOCYTES ABSOLUTE: 1.3 THOU/MM3 (ref 1–4.8)
MCH RBC QN AUTO: 30.2 PG (ref 26–33)
MCHC RBC AUTO-ENTMCNC: 32.6 GM/DL (ref 32.2–35.5)
MCV RBC AUTO: 92.5 FL (ref 80–94)
MODE: NORMAL
MONOCYTES # BLD: 10.7 %
MONOCYTES ABSOLUTE: 1.5 THOU/MM3 (ref 0.4–1.3)
MRSA SCREEN: NORMAL
MYOGLOBIN URINE: NEGATIVE
NUCLEATED RED BLOOD CELLS: 1 /100 WBC
O2 SATURATION: 96 %
PCO2: 35 MMHG (ref 35–45)
PH BLOOD GAS: 7.45 (ref 7.35–7.45)
PLATELET # BLD: 174 THOU/MM3 (ref 130–400)
PMV BLD AUTO: 10.6 FL (ref 9.4–12.4)
PO2: 76 MMHG (ref 71–104)
POTASSIUM SERPL-SCNC: 3.9 MEQ/L (ref 3.5–5.2)
RBC # BLD: 2.55 MILL/MM3 (ref 4.7–6.1)
SEG NEUTROPHILS: 78.7 %
SEGMENTED NEUTROPHILS ABSOLUTE COUNT: 11.1 THOU/MM3 (ref 1.8–7.7)
SET PEEP: 8 MMHG
SET PRESS SUPP: 5 CMH2O
SODIUM BLD-SCNC: 141 MEQ/L (ref 135–145)
SOURCE, BLOOD GAS: NORMAL
TOTAL CK: 342 U/L (ref 55–170)
TOTAL PROTEIN: 4.9 G/DL (ref 6.1–8)
URINE CULTURE, ROUTINE: NORMAL
WBC # BLD: 14.1 THOU/MM3 (ref 4.8–10.8)

## 2018-08-05 PROCEDURE — 83874 ASSAY OF MYOGLOBIN: CPT

## 2018-08-05 PROCEDURE — 2580000003 HC RX 258: Performed by: INTERNAL MEDICINE

## 2018-08-05 PROCEDURE — 85025 COMPLETE CBC W/AUTO DIFF WBC: CPT

## 2018-08-05 PROCEDURE — 6370000000 HC RX 637 (ALT 250 FOR IP): Performed by: INTERNAL MEDICINE

## 2018-08-05 PROCEDURE — 85014 HEMATOCRIT: CPT

## 2018-08-05 PROCEDURE — 80053 COMPREHEN METABOLIC PANEL: CPT

## 2018-08-05 PROCEDURE — 85018 HEMOGLOBIN: CPT

## 2018-08-05 PROCEDURE — 94003 VENT MGMT INPAT SUBQ DAY: CPT

## 2018-08-05 PROCEDURE — 99024 POSTOP FOLLOW-UP VISIT: CPT | Performed by: NEUROLOGICAL SURGERY

## 2018-08-05 PROCEDURE — 6370000000 HC RX 637 (ALT 250 FOR IP): Performed by: NEUROLOGICAL SURGERY

## 2018-08-05 PROCEDURE — 6370000000 HC RX 637 (ALT 250 FOR IP): Performed by: PHYSICIAN ASSISTANT

## 2018-08-05 PROCEDURE — 6360000002 HC RX W HCPCS: Performed by: NEUROLOGICAL SURGERY

## 2018-08-05 PROCEDURE — 2709999900 HC NON-CHARGEABLE SUPPLY

## 2018-08-05 PROCEDURE — 82947 ASSAY GLUCOSE BLOOD QUANT: CPT

## 2018-08-05 PROCEDURE — 2500000003 HC RX 250 WO HCPCS: Performed by: INTERNAL MEDICINE

## 2018-08-05 PROCEDURE — 94640 AIRWAY INHALATION TREATMENT: CPT

## 2018-08-05 PROCEDURE — 82803 BLOOD GASES ANY COMBINATION: CPT

## 2018-08-05 PROCEDURE — 2700000000 HC OXYGEN THERAPY PER DAY

## 2018-08-05 PROCEDURE — C9113 INJ PANTOPRAZOLE SODIUM, VIA: HCPCS | Performed by: INTERNAL MEDICINE

## 2018-08-05 PROCEDURE — 82550 ASSAY OF CK (CPK): CPT

## 2018-08-05 PROCEDURE — 2000000000 HC ICU R&B

## 2018-08-05 PROCEDURE — 36415 COLL VENOUS BLD VENIPUNCTURE: CPT

## 2018-08-05 PROCEDURE — 6360000002 HC RX W HCPCS: Performed by: INTERNAL MEDICINE

## 2018-08-05 PROCEDURE — 82948 REAGENT STRIP/BLOOD GLUCOSE: CPT

## 2018-08-05 PROCEDURE — 36600 WITHDRAWAL OF ARTERIAL BLOOD: CPT

## 2018-08-05 PROCEDURE — 71045 X-RAY EXAM CHEST 1 VIEW: CPT

## 2018-08-05 PROCEDURE — 36592 COLLECT BLOOD FROM PICC: CPT

## 2018-08-05 PROCEDURE — 93005 ELECTROCARDIOGRAM TRACING: CPT | Performed by: INTERNAL MEDICINE

## 2018-08-05 RX ORDER — POTASSIUM CHLORIDE 20MEQ/15ML
20 LIQUID (ML) ORAL ONCE
Status: COMPLETED | OUTPATIENT
Start: 2018-08-05 | End: 2018-08-05

## 2018-08-05 RX ORDER — INSULIN GLARGINE 100 [IU]/ML
10 INJECTION, SOLUTION SUBCUTANEOUS ONCE
Status: COMPLETED | OUTPATIENT
Start: 2018-08-05 | End: 2018-08-05

## 2018-08-05 RX ORDER — DEXTROSE MONOHYDRATE 25 G/50ML
12.5 INJECTION, SOLUTION INTRAVENOUS PRN
Status: DISCONTINUED | OUTPATIENT
Start: 2018-08-05 | End: 2018-08-18 | Stop reason: ALTCHOICE

## 2018-08-05 RX ORDER — INSULIN GLARGINE 100 [IU]/ML
25 INJECTION, SOLUTION SUBCUTANEOUS DAILY
Status: DISCONTINUED | OUTPATIENT
Start: 2018-08-06 | End: 2018-08-06

## 2018-08-05 RX ORDER — DEXAMETHASONE SODIUM PHOSPHATE 4 MG/ML
3 INJECTION, SOLUTION INTRA-ARTICULAR; INTRALESIONAL; INTRAMUSCULAR; INTRAVENOUS; SOFT TISSUE EVERY 6 HOURS
Status: DISCONTINUED | OUTPATIENT
Start: 2018-08-05 | End: 2018-08-07

## 2018-08-05 RX ADMIN — GABAPENTIN 600 MG: 600 TABLET, FILM COATED ORAL at 20:08

## 2018-08-05 RX ADMIN — HYDROCODONE BITARTRATE AND ACETAMINOPHEN 1 TABLET: 10; 325 TABLET ORAL at 10:12

## 2018-08-05 RX ADMIN — FENTANYL CITRATE 50 MCG: 50 INJECTION INTRAMUSCULAR; INTRAVENOUS at 21:50

## 2018-08-05 RX ADMIN — POTASSIUM CHLORIDE 20 MEQ: 40 SOLUTION ORAL at 09:09

## 2018-08-05 RX ADMIN — DEXAMETHASONE SODIUM PHOSPHATE 3 MG: 4 INJECTION, SOLUTION INTRAMUSCULAR; INTRAVENOUS at 18:07

## 2018-08-05 RX ADMIN — GABAPENTIN 600 MG: 600 TABLET, FILM COATED ORAL at 09:09

## 2018-08-05 RX ADMIN — IPRATROPIUM BROMIDE AND ALBUTEROL SULFATE 1 AMPULE: .5; 3 SOLUTION RESPIRATORY (INHALATION) at 11:35

## 2018-08-05 RX ADMIN — LEVOTHYROXINE SODIUM 50 MCG: 50 TABLET ORAL at 07:53

## 2018-08-05 RX ADMIN — INSULIN GLARGINE 10 UNITS: 100 INJECTION, SOLUTION SUBCUTANEOUS at 18:24

## 2018-08-05 RX ADMIN — DEXAMETHASONE SODIUM PHOSPHATE 4 MG: 4 INJECTION, SOLUTION INTRA-ARTICULAR; INTRALESIONAL; INTRAMUSCULAR; INTRAVENOUS; SOFT TISSUE at 00:46

## 2018-08-05 RX ADMIN — IPRATROPIUM BROMIDE AND ALBUTEROL SULFATE 1 AMPULE: .5; 3 SOLUTION RESPIRATORY (INHALATION) at 07:52

## 2018-08-05 RX ADMIN — IPRATROPIUM BROMIDE AND ALBUTEROL SULFATE 1 AMPULE: .5; 3 SOLUTION RESPIRATORY (INHALATION) at 17:17

## 2018-08-05 RX ADMIN — DEXMEDETOMIDINE HYDROCHLORIDE 0.4 MCG/KG/HR: 100 INJECTION, SOLUTION INTRAVENOUS at 09:09

## 2018-08-05 RX ADMIN — INSULIN GLARGINE 15 UNITS: 100 INJECTION, SOLUTION SUBCUTANEOUS at 09:00

## 2018-08-05 RX ADMIN — Medication 9 UNITS: at 12:49

## 2018-08-05 RX ADMIN — NYSTATIN 500000 UNITS: 100000 SUSPENSION ORAL at 09:09

## 2018-08-05 RX ADMIN — SODIUM CHLORIDE, POTASSIUM CHLORIDE, SODIUM LACTATE AND CALCIUM CHLORIDE: 600; 310; 30; 20 INJECTION, SOLUTION INTRAVENOUS at 04:01

## 2018-08-05 RX ADMIN — IPRATROPIUM BROMIDE AND ALBUTEROL SULFATE 1 AMPULE: .5; 3 SOLUTION RESPIRATORY (INHALATION) at 22:25

## 2018-08-05 RX ADMIN — GABAPENTIN 600 MG: 600 TABLET, FILM COATED ORAL at 14:44

## 2018-08-05 RX ADMIN — HYDROCODONE BITARTRATE AND ACETAMINOPHEN 1 TABLET: 10; 325 TABLET ORAL at 20:08

## 2018-08-05 RX ADMIN — PANTOPRAZOLE SODIUM 40 MG: 40 INJECTION, POWDER, FOR SOLUTION INTRAVENOUS at 09:08

## 2018-08-05 RX ADMIN — BICALUTAMIDE 50 MG: 50 TABLET, FILM COATED ORAL at 09:09

## 2018-08-05 RX ADMIN — DEXAMETHASONE SODIUM PHOSPHATE 4 MG: 4 INJECTION, SOLUTION INTRA-ARTICULAR; INTRALESIONAL; INTRAMUSCULAR; INTRAVENOUS; SOFT TISSUE at 07:01

## 2018-08-05 RX ADMIN — DEXMEDETOMIDINE HYDROCHLORIDE 0.5 MCG/KG/HR: 100 INJECTION, SOLUTION INTRAVENOUS at 02:31

## 2018-08-05 RX ADMIN — DEXAMETHASONE SODIUM PHOSPHATE 3 MG: 4 INJECTION, SOLUTION INTRAMUSCULAR; INTRAVENOUS at 12:49

## 2018-08-05 RX ADMIN — FENTANYL CITRATE 50 MCG: 50 INJECTION INTRAMUSCULAR; INTRAVENOUS at 09:25

## 2018-08-05 RX ADMIN — SODIUM CHLORIDE 4.3 UNITS/HR: 9 INJECTION, SOLUTION INTRAVENOUS at 20:03

## 2018-08-05 ASSESSMENT — PULMONARY FUNCTION TESTS
PIF_VALUE: 22
PIF_VALUE: 24
PIF_VALUE: 22

## 2018-08-05 ASSESSMENT — PAIN SCALES - GENERAL
PAINLEVEL_OUTOF10: 7
PAINLEVEL_OUTOF10: 8
PAINLEVEL_OUTOF10: 7
PAINLEVEL_OUTOF10: 0
PAINLEVEL_OUTOF10: 7

## 2018-08-05 NOTE — PROGRESS NOTES
Pt was extubated by Valentin De Leon 10:45, placed on 6 lm O2, pt then changed to high flow 25 liters, 30% at 11:00

## 2018-08-05 NOTE — PROGRESS NOTES
. Patient extubated and placed on 6 liters/min via nasal cannula. Post extubation SpO2 is 96% with HR  78 bpm and RR 15 breaths/min. Patient had strong cough that was non-productive. Extubation Well tolerated by patient. Yenny Hanna

## 2018-08-05 NOTE — PROGRESS NOTES
Covering Dr. Bennett [facility] day 2    S/P  bilateral pedicle screws in T5, T6, T7, T10 and T11 + posterior decompression at T8-T9    Dx: metastatic prostate cancer + spinal compression and paraplegia     No acute events overnight. Vitals stable today   Patient is still intubated ( on CPAP, plan for extubation today). GCS 11T  Open his eyes spontaneously   FC in his upper extremities with good strength. I did not see any movement  in his lower extremities ( but his nurse told me that she saw some movements for painful stimulation sometimes in both feet, perhaps the is due to reflex ). No able to feel my touch below the knees level. Has babinski's sign in bothsides    Dressing: areas of bloody discharge. Drain 1 : 130 ml     Drain  2:   150 ml         A/P:    Post Op day 2    S/P  bilateral pedicle screws in T5, T6, T7, T10 and T11 + posterior decompression at T8-T9    Dx: metastatic prostate cancer + spinal compression and paraplegia     -  Decrease the steroids to 3 mg q  6.  - Strict blood sugar control.   -  Keep the MAP at 85 .  -  Patient can be extubated from neurological prospective. - Drains to gravity( no suction).

## 2018-08-05 NOTE — PROGRESS NOTES
2200 - Assessment completed at this time. See flowsheets for assessment details. Patient initially unresponsive to verbal stimuli and would only respond to painful stimuli in bilateral upper extremities. Patient accepts hygiene care and linen change at this time. When patient laid flat to reposition and change linens, patient alert and responsive at that time. Patient able to follow commands and appropriately squeeze bilateral hands, with strong  strength noted. Patient slightly agitated, however is able to be calmed easily. Patient able to nod/gesture appropriately at this time. Patient unable to move lower extremities to commands and remains unable to move extremities to painful stimuli. Palpation and painful stimuli performed on lower extremities up to abdomen. Patient instructed to nod head \"yes\" when sensation felt. Patient nods head \"yes\" that sensation was felt at level of abdomen, however shakes head \"no\" that no sensation was felt in bilateral lower extremities. Patient remains calm and ventilator at this time, following reassurance and 1:1. OG tube placement verified per auscultation with 30mL air bolus and 230mL gastric residual noted. OG tube flushed with water prior to and following administration of routine medications at this time. Continuous tube feed held at this time d/t high residual volume. 2331 - Patient continues to rest quietly in bed with eyes closed. No signs of distress or discomfort noted. Continuing to monitor.

## 2018-08-05 NOTE — PROGRESS NOTES
regular rhythm. LUNGS:  diminished air entry bilaterally. ABDOMEN: Obese, bowel sounds are still appreciated  with no guarding or tenderness. EXTREMITIES:  No edema noted. Dorsalis pedis and posterior tibial pulses  +1.   NEUROLOGIC:  Patient is  Awake on the vent, folows commands, able to squeeze my hand with both hands, no movement in legs to commands or to pain    Review of Labs and Diagnostic Testing:    Recent Results (from the past 24 hour(s))   POCT glucose    Collection Time: 08/04/18  9:31 AM   Result Value Ref Range    POC Glucose 206 (H) 70 - 108 mg/dl   POCT Glucose    Collection Time: 08/04/18 12:57 PM   Result Value Ref Range    POC Glucose 230 (H) 70 - 108 mg/dl   POCT glucose    Collection Time: 08/04/18  4:56 PM   Result Value Ref Range    POC Glucose 320 (H) 70 - 108 mg/dl   POCT glucose    Collection Time: 08/05/18 12:36 AM   Result Value Ref Range    POC Glucose 267 (H) 70 - 108 mg/dl   Myoglobin, urine    Collection Time: 08/05/18  5:40 AM   Result Value Ref Range    Myoglobin, Ur NEGATIVE NEGATIVE   Comprehensive metabolic panel    Collection Time: 08/05/18  5:40 AM   Result Value Ref Range    Glucose 290 (H) 70 - 108 mg/dL    CREATININE 0.7 0.4 - 1.2 mg/dL    BUN 33 (H) 7 - 22 mg/dL    Sodium 141 135 - 145 meq/L    Potassium 3.9 3.5 - 5.2 meq/L    Chloride 107 98 - 111 meq/L    CO2 23 23 - 33 meq/L    Calcium 8.1 (L) 8.5 - 10.5 mg/dL    AST 20 5 - 40 U/L    Alkaline Phosphatase 141 (H) 38 - 126 U/L    Total Protein 4.9 (L) 6.1 - 8.0 g/dL    Alb 2.8 (L) 3.5 - 5.1 g/dL    Total Bilirubin 0.4 0.3 - 1.2 mg/dL    ALT 12 11 - 66 U/L   CK    Collection Time: 08/05/18  5:40 AM   Result Value Ref Range    Total  (H) 55 - 170 U/L   CBC auto differential    Collection Time: 08/05/18  5:40 AM   Result Value Ref Range    WBC 14.1 (H) 4.8 - 10.8 thou/mm3    RBC 2.55 (L) 4.70 - 6.10 mill/mm3    Hemoglobin 7.7 (L) 14.0 - 18.0 gm/dl    Hematocrit 23.6 (L) 42.0 - 52.0 %    MCV 92.5 80.0 - 94.0 fL MCH 30.2 26.0 - 33.0 pg    MCHC 32.6 32.2 - 35.5 gm/dl    RDW-CV 13.3 11.5 - 14.5 %    RDW-SD 44.4 35.0 - 45.0 fL    Platelets 108 800 - 810 thou/mm3    MPV 10.6 9.4 - 12.4 fL    Seg Neutrophils 78.7 %    Lymphocytes 9.0 %    Monocytes 10.7 %    Eosinophils 0.0 %    Basophils 0.1 %    Immature Granulocytes 1.5 %    Segs Absolute 11.1 (H) 1.8 - 7.7 thou/mm3    Lymphocytes # 1.3 1.0 - 4.8 thou/mm3    Monocytes # 1.5 (H) 0.4 - 1.3 thou/mm3    Eosinophils # 0.0 0.0 - 0.4 thou/mm3    Basophils # 0.0 0.0 - 0.1 thou/mm3    Immature Grans (Abs) 0.21 (H) 0.00 - 0.07 thou/mm3    nRBC 1 /100 wbc   Anion Gap    Collection Time: 08/05/18  5:40 AM   Result Value Ref Range    Anion Gap 11.0 8.0 - 16.0 meq/L   Glomerular Filtration Rate, Estimated    Collection Time: 08/05/18  5:40 AM   Result Value Ref Range    Est, Glom Filt Rate >90 ml/min/1.73m2       Radiology:     Ct Abdomen Pelvis W Wo Contrast Additional Contrast? Radiologist Recommendation    Result Date: 8/1/2018  PROCEDURE: CT ABDOMEN PELVIS W WO CONTRAST CLINICAL INFORMATION: acute cord compression r/o mets . COMPARISON: None. TECHNIQUE: 5 mm axial CT images were obtained through the abdomen and pelvis before and after the administration of intravenous and oral contrast. Coronal and sagittal reconstructions were obtained. All CT scans at this facility use dose modulation, iterative reconstruction, and/or weight-based dosing when appropriate to reduce radiation dose to as low as reasonably achievable. FINDINGS:  There is basilar fibrolinear scarring left greater than right. The noncontrast appearance of the cardiac chambers, gallbladder, pancreas and spleen are negative for active pathology with mild fatty replacement of the liver as well as pancreas noted. There is no biliary obstruction. Left and right kidney demonstrate nonobstructing punctate calculi.  There is trace contrast retained from previous images, correlate with mild perinephric stranding associated with severe bilaterally. C4-5, C5-6, C6-7: The disc spaces are fused limiting detail. The central canal remains patent to the cervical segment of surgery. At C4-5 there is bilateral right foramen narrowing. C5-6 mild bilateral foramen narrowing present. C6-7: The foramen are patent. C7-T1: The disc space, central canal and foramen are grossly normal. There is a dominant left vertebral arteries system. 1. Postsurgical changes of the mid lower cervical spine with anterior interbody fusion. 2. Multilevel degenerative changes described above at individual levels. **This report has been created using voice recognition software. It may contain minor errors which are inherent in voice recognition technology. ** Final report electronically signed by Dr. Chris Singer on 8/2/2018 1:09 AM    Mri Thoracic Spine W Wo Contrast    Result Date: 8/2/2018  PROCEDURE: MRI THORACIC SPINE W WO CONTRAST CLINICAL INFORMATION: prostate cancer vertebral metastases. COMPARISON: No prior study. TECHNIQUE: Sagittal T1, T2 and STIR sequences were obtained through the thoracic spine. Axial T2-weighted images were obtained through the discs. Postcontrast axial and sagittal T1-weighted images were obtained. FINDINGS:  There is mid thoracic infiltrative changes of several vertebra with mild retropulsion of the dorsal endplate combining with pedicular infiltration producing encroachment upon the central canal these changes are seen near T11 T10 and minimally at T12. Cord signal is grossly intact with no acute high-grade impingement. The conus appears intact visualized near L1-2. Infiltrative changes of multiple thoracic vertebra consistent with metastatic change per history. Mild central canal narrowing due to encroachment by pedicular and vertebral body infiltration. **This report has been created using voice recognition software. It may contain minor errors which are inherent in voice recognition technology. ** Final report electronically [facility]          Electronically signed by Lia Beltran MD on 8/5/2018 at 7:46 AM

## 2018-08-05 NOTE — PROGRESS NOTES
pulse 69, temperature 98.4 °F (36.9 °C), temperature source Oral, resp. rate 17, height 5' 11\" (1.803 m), weight 294 lb 5 oz (133.5 kg), SpO2 95 %. No fever. Output: Producing urine and no stool output. HEENT: Normal HEENT exam.    Lungs:  Normal effort and normal respiratory rate. Breath sounds clear to auscultation. There are rhonchi. Heart: Normal rate. Regular rhythm. S1 normal and S2 normal.    Abdomen: Abdomen is soft and distended. Bowel sounds are normal.   There is no abdominal tenderness. Pulses: Distal pulses are intact. Neurological: Patient is alert and oriented to person, place and time. GCS score is 15. Patient has abnormal muscle tone. (NO sensation in both lower extremities and 0/5 motor strength). Pupils:  Pupils are equal, round, and reactive to light. Skin:  Warm, dry and pale. Drains: Driaing blood tinged fluid in both JPs, off suction today  Assessment:    Condition: In stable condition. Improving (From respiratory standpoint). (Acute respiratory failure: Extubated today and doing well  2. Paraplegia: Secondary to cord compression from tumor involving T7/T8 with fracture. Status post surgical debulking and repair 8/3/18. On high-dose steroids - cut down to 3mg q6h, on harmonal therapy as well, no motor or sensory strength knees below  3. Metastatic prostate cancer: Previous surgical resection in 2014. 4. Diabetes mellitus: Subcutaneous insulin with sliding scale insulin, Will increase lantus to 20 units from tomorrow if he resumes feeding well   5. COPD: NOT on home O2 at baseline  6. Hypothyroidism: On Synthroid. 7. Morbid obesity: Aware. 8.Obstructive sleep apnea: BiPAP during sleep  9. Leukocytosis: Likely related to high-dose steroids  10. Shock -On levo to maintain MAP over 85 for spinal perfusion  11. Delirium - Resolved today    Wife updated at bedside    Case discussed with neurosurgery). Plan:   Per physical therapy.   Continue respiratory

## 2018-08-06 LAB
ALBUMIN SERPL-MCNC: 2.9 G/DL (ref 3.5–5.1)
ALP BLD-CCNC: 139 U/L (ref 38–126)
ALT SERPL-CCNC: 11 U/L (ref 11–66)
ANION GAP SERPL CALCULATED.3IONS-SCNC: 13 MEQ/L (ref 8–16)
AST SERPL-CCNC: 17 U/L (ref 5–40)
BASOPHILS # BLD: 0.1 %
BASOPHILS ABSOLUTE: 0 THOU/MM3 (ref 0–0.1)
BILIRUB SERPL-MCNC: 0.4 MG/DL (ref 0.3–1.2)
BUN BLDV-MCNC: 25 MG/DL (ref 7–22)
CALCIUM SERPL-MCNC: 8.1 MG/DL (ref 8.5–10.5)
CHLORIDE BLD-SCNC: 106 MEQ/L (ref 98–111)
CO2: 25 MEQ/L (ref 23–33)
CREAT SERPL-MCNC: 0.5 MG/DL (ref 0.4–1.2)
EKG ATRIAL RATE: 76 BPM
EKG ATRIAL RATE: 85 BPM
EKG P AXIS: 44 DEGREES
EKG P AXIS: 62 DEGREES
EKG P-R INTERVAL: 100 MS
EKG P-R INTERVAL: 96 MS
EKG Q-T INTERVAL: 372 MS
EKG Q-T INTERVAL: 378 MS
EKG QRS DURATION: 90 MS
EKG QRS DURATION: 92 MS
EKG QTC CALCULATION (BAZETT): 425 MS
EKG QTC CALCULATION (BAZETT): 442 MS
EKG R AXIS: 48 DEGREES
EKG R AXIS: 61 DEGREES
EKG T AXIS: 17 DEGREES
EKG T AXIS: 40 DEGREES
EKG VENTRICULAR RATE: 76 BPM
EKG VENTRICULAR RATE: 85 BPM
EOSINOPHIL # BLD: 0 %
EOSINOPHILS ABSOLUTE: 0 THOU/MM3 (ref 0–0.4)
ERYTHROCYTE [DISTWIDTH] IN BLOOD BY AUTOMATED COUNT: 13.3 % (ref 11.5–14.5)
ERYTHROCYTE [DISTWIDTH] IN BLOOD BY AUTOMATED COUNT: 44.1 FL (ref 35–45)
GFR SERPL CREATININE-BSD FRML MDRD: > 90 ML/MIN/1.73M2
GLUCOSE BLD-MCNC: 118 MG/DL (ref 70–108)
GLUCOSE BLD-MCNC: 134 MG/DL (ref 70–108)
GLUCOSE BLD-MCNC: 164 MG/DL (ref 70–108)
GLUCOSE BLD-MCNC: 183 MG/DL (ref 70–108)
GLUCOSE BLD-MCNC: 196 MG/DL (ref 70–108)
GLUCOSE BLD-MCNC: 211 MG/DL (ref 70–108)
GLUCOSE, WHOLE BLOOD: 153 MG/DL (ref 70–108)
GLUCOSE, WHOLE BLOOD: 154 MG/DL (ref 70–108)
GLUCOSE, WHOLE BLOOD: 156 MG/DL (ref 70–108)
GLUCOSE, WHOLE BLOOD: 159 MG/DL (ref 70–108)
GLUCOSE, WHOLE BLOOD: 169 MG/DL (ref 70–108)
GLUCOSE, WHOLE BLOOD: 172 MG/DL (ref 70–108)
GLUCOSE, WHOLE BLOOD: 178 MG/DL (ref 70–108)
GLUCOSE, WHOLE BLOOD: 189 MG/DL (ref 70–108)
HCT VFR BLD CALC: 22.6 % (ref 42–52)
HEMOGLOBIN: 7.6 GM/DL (ref 14–18)
IMMATURE GRANS (ABS): 0.34 THOU/MM3 (ref 0–0.07)
IMMATURE GRANULOCYTES: 2.1 %
LYMPHOCYTES # BLD: 6.7 %
LYMPHOCYTES ABSOLUTE: 1.1 THOU/MM3 (ref 1–4.8)
MCH RBC QN AUTO: 30.8 PG (ref 26–33)
MCHC RBC AUTO-ENTMCNC: 33.6 GM/DL (ref 32.2–35.5)
MCV RBC AUTO: 91.5 FL (ref 80–94)
MONOCYTES # BLD: 11.8 %
MONOCYTES ABSOLUTE: 1.9 THOU/MM3 (ref 0.4–1.3)
NUCLEATED RED BLOOD CELLS: 3 /100 WBC
PLATELET # BLD: 172 THOU/MM3 (ref 130–400)
PMV BLD AUTO: 10.6 FL (ref 9.4–12.4)
POTASSIUM SERPL-SCNC: 3.4 MEQ/L (ref 3.5–5.2)
RBC # BLD: 2.47 MILL/MM3 (ref 4.7–6.1)
SEG NEUTROPHILS: 79.3 %
SEGMENTED NEUTROPHILS ABSOLUTE COUNT: 12.6 THOU/MM3 (ref 1.8–7.7)
SODIUM BLD-SCNC: 144 MEQ/L (ref 135–145)
TOTAL CK: 279 U/L (ref 55–170)
TOTAL PROTEIN: 5 G/DL (ref 6.1–8)
TROPONIN T: < 0.01 NG/ML
TROPONIN T: < 0.01 NG/ML
WBC # BLD: 15.9 THOU/MM3 (ref 4.8–10.8)

## 2018-08-06 PROCEDURE — 6360000002 HC RX W HCPCS: Performed by: INTERNAL MEDICINE

## 2018-08-06 PROCEDURE — 2060000000 HC ICU INTERMEDIATE R&B

## 2018-08-06 PROCEDURE — 85025 COMPLETE CBC W/AUTO DIFF WBC: CPT

## 2018-08-06 PROCEDURE — 2580000003 HC RX 258: Performed by: INTERNAL MEDICINE

## 2018-08-06 PROCEDURE — 99024 POSTOP FOLLOW-UP VISIT: CPT | Performed by: NEUROLOGICAL SURGERY

## 2018-08-06 PROCEDURE — 93010 ELECTROCARDIOGRAM REPORT: CPT | Performed by: INTERNAL MEDICINE

## 2018-08-06 PROCEDURE — 99233 SBSQ HOSP IP/OBS HIGH 50: CPT | Performed by: INTERNAL MEDICINE

## 2018-08-06 PROCEDURE — 80053 COMPREHEN METABOLIC PANEL: CPT

## 2018-08-06 PROCEDURE — 82948 REAGENT STRIP/BLOOD GLUCOSE: CPT

## 2018-08-06 PROCEDURE — 84484 ASSAY OF TROPONIN QUANT: CPT

## 2018-08-06 PROCEDURE — 82947 ASSAY GLUCOSE BLOOD QUANT: CPT

## 2018-08-06 PROCEDURE — 2709999900 HC NON-CHARGEABLE SUPPLY

## 2018-08-06 PROCEDURE — 6370000000 HC RX 637 (ALT 250 FOR IP): Performed by: PHYSICIAN ASSISTANT

## 2018-08-06 PROCEDURE — 82550 ASSAY OF CK (CPK): CPT

## 2018-08-06 PROCEDURE — 6370000000 HC RX 637 (ALT 250 FOR IP): Performed by: INTERNAL MEDICINE

## 2018-08-06 PROCEDURE — 36415 COLL VENOUS BLD VENIPUNCTURE: CPT

## 2018-08-06 PROCEDURE — 2500000003 HC RX 250 WO HCPCS: Performed by: INTERNAL MEDICINE

## 2018-08-06 PROCEDURE — 1200000000 HC SEMI PRIVATE

## 2018-08-06 PROCEDURE — 6360000002 HC RX W HCPCS: Performed by: NEUROLOGICAL SURGERY

## 2018-08-06 PROCEDURE — 99232 SBSQ HOSP IP/OBS MODERATE 35: CPT | Performed by: PHYSICIAN ASSISTANT

## 2018-08-06 PROCEDURE — 94640 AIRWAY INHALATION TREATMENT: CPT

## 2018-08-06 PROCEDURE — C9113 INJ PANTOPRAZOLE SODIUM, VIA: HCPCS | Performed by: INTERNAL MEDICINE

## 2018-08-06 PROCEDURE — 93005 ELECTROCARDIOGRAM TRACING: CPT | Performed by: INTERNAL MEDICINE

## 2018-08-06 PROCEDURE — 2700000000 HC OXYGEN THERAPY PER DAY

## 2018-08-06 PROCEDURE — 1200000003 HC TELEMETRY R&B

## 2018-08-06 PROCEDURE — 36592 COLLECT BLOOD FROM PICC: CPT

## 2018-08-06 RX ORDER — DOCUSATE SODIUM 100 MG/1
100 CAPSULE, LIQUID FILLED ORAL DAILY
Status: DISCONTINUED | OUTPATIENT
Start: 2018-08-06 | End: 2018-08-08

## 2018-08-06 RX ORDER — INSULIN GLARGINE 100 [IU]/ML
30 INJECTION, SOLUTION SUBCUTANEOUS DAILY
Status: DISCONTINUED | OUTPATIENT
Start: 2018-08-07 | End: 2018-08-10

## 2018-08-06 RX ORDER — POTASSIUM CHLORIDE 20 MEQ/1
40 TABLET, EXTENDED RELEASE ORAL ONCE
Status: COMPLETED | OUTPATIENT
Start: 2018-08-06 | End: 2018-08-06

## 2018-08-06 RX ORDER — OXYCODONE HYDROCHLORIDE AND ACETAMINOPHEN 5; 325 MG/1; MG/1
1 TABLET ORAL EVERY 6 HOURS PRN
Status: DISCONTINUED | OUTPATIENT
Start: 2018-08-06 | End: 2018-08-20

## 2018-08-06 RX ORDER — PANTOPRAZOLE SODIUM 40 MG/1
40 TABLET, DELAYED RELEASE ORAL
Status: DISCONTINUED | OUTPATIENT
Start: 2018-08-07 | End: 2018-08-07 | Stop reason: SDUPTHER

## 2018-08-06 RX ORDER — SENNA PLUS 8.6 MG/1
1 TABLET ORAL NIGHTLY
Status: DISCONTINUED | OUTPATIENT
Start: 2018-08-06 | End: 2018-08-08

## 2018-08-06 RX ORDER — POLYETHYLENE GLYCOL 3350 17 G/17G
17 POWDER, FOR SOLUTION ORAL DAILY
Status: DISCONTINUED | OUTPATIENT
Start: 2018-08-06 | End: 2018-08-18 | Stop reason: ALTCHOICE

## 2018-08-06 RX ADMIN — NYSTATIN 500000 UNITS: 100000 SUSPENSION ORAL at 20:23

## 2018-08-06 RX ADMIN — INSULIN LISPRO 1 UNITS: 100 INJECTION, SOLUTION INTRAVENOUS; SUBCUTANEOUS at 22:55

## 2018-08-06 RX ADMIN — GABAPENTIN 600 MG: 600 TABLET, FILM COATED ORAL at 13:37

## 2018-08-06 RX ADMIN — DULOXETINE HYDROCHLORIDE 60 MG: 60 CAPSULE, DELAYED RELEASE ORAL at 08:22

## 2018-08-06 RX ADMIN — DEXAMETHASONE SODIUM PHOSPHATE 3 MG: 4 INJECTION, SOLUTION INTRAMUSCULAR; INTRAVENOUS at 06:10

## 2018-08-06 RX ADMIN — OXYCODONE HYDROCHLORIDE AND ACETAMINOPHEN 1 TABLET: 5; 325 TABLET ORAL at 16:40

## 2018-08-06 RX ADMIN — PANTOPRAZOLE SODIUM 40 MG: 40 INJECTION, POWDER, FOR SOLUTION INTRAVENOUS at 08:22

## 2018-08-06 RX ADMIN — GABAPENTIN 600 MG: 600 TABLET, FILM COATED ORAL at 20:23

## 2018-08-06 RX ADMIN — IPRATROPIUM BROMIDE AND ALBUTEROL SULFATE 1 AMPULE: .5; 3 SOLUTION RESPIRATORY (INHALATION) at 09:29

## 2018-08-06 RX ADMIN — IPRATROPIUM BROMIDE AND ALBUTEROL SULFATE 1 AMPULE: .5; 3 SOLUTION RESPIRATORY (INHALATION) at 13:50

## 2018-08-06 RX ADMIN — HYDROCODONE BITARTRATE AND ACETAMINOPHEN 1 TABLET: 10; 325 TABLET ORAL at 11:48

## 2018-08-06 RX ADMIN — DEXAMETHASONE SODIUM PHOSPHATE 3 MG: 4 INJECTION, SOLUTION INTRAMUSCULAR; INTRAVENOUS at 11:48

## 2018-08-06 RX ADMIN — FENTANYL CITRATE 50 MCG: 50 INJECTION INTRAMUSCULAR; INTRAVENOUS at 03:00

## 2018-08-06 RX ADMIN — NYSTATIN 500000 UNITS: 100000 SUSPENSION ORAL at 18:56

## 2018-08-06 RX ADMIN — NYSTATIN 500000 UNITS: 100000 SUSPENSION ORAL at 14:47

## 2018-08-06 RX ADMIN — SODIUM CHLORIDE 4.4 UNITS/HR: 9 INJECTION, SOLUTION INTRAVENOUS at 11:06

## 2018-08-06 RX ADMIN — HYDROCODONE BITARTRATE AND ACETAMINOPHEN 1 TABLET: 10; 325 TABLET ORAL at 20:23

## 2018-08-06 RX ADMIN — NYSTATIN 500000 UNITS: 100000 SUSPENSION ORAL at 08:23

## 2018-08-06 RX ADMIN — LEVOTHYROXINE SODIUM 50 MCG: 50 TABLET ORAL at 06:10

## 2018-08-06 RX ADMIN — IPRATROPIUM BROMIDE AND ALBUTEROL SULFATE 1 AMPULE: .5; 3 SOLUTION RESPIRATORY (INHALATION) at 21:36

## 2018-08-06 RX ADMIN — METHYLNALTREXONE BROMIDE 20 MG: 12 INJECTION, SOLUTION SUBCUTANEOUS at 16:37

## 2018-08-06 RX ADMIN — Medication 7 MCG/MIN: at 04:24

## 2018-08-06 RX ADMIN — POLYETHYLENE GLYCOL 3350 17 G: 17 POWDER, FOR SOLUTION ORAL at 10:14

## 2018-08-06 RX ADMIN — ONDANSETRON 4 MG: 2 INJECTION INTRAMUSCULAR; INTRAVENOUS at 07:12

## 2018-08-06 RX ADMIN — DEXAMETHASONE SODIUM PHOSPHATE 3 MG: 4 INJECTION, SOLUTION INTRAMUSCULAR; INTRAVENOUS at 01:16

## 2018-08-06 RX ADMIN — OXYCODONE HYDROCHLORIDE AND ACETAMINOPHEN 1 TABLET: 5; 325 TABLET ORAL at 23:01

## 2018-08-06 RX ADMIN — BICALUTAMIDE 50 MG: 50 TABLET, FILM COATED ORAL at 08:22

## 2018-08-06 RX ADMIN — INSULIN GLARGINE 25 UNITS: 100 INJECTION, SOLUTION SUBCUTANEOUS at 08:23

## 2018-08-06 RX ADMIN — FENTANYL CITRATE 50 MCG: 50 INJECTION INTRAMUSCULAR; INTRAVENOUS at 00:26

## 2018-08-06 RX ADMIN — DEXAMETHASONE SODIUM PHOSPHATE 3 MG: 4 INJECTION, SOLUTION INTRAMUSCULAR; INTRAVENOUS at 18:56

## 2018-08-06 RX ADMIN — GABAPENTIN 600 MG: 600 TABLET, FILM COATED ORAL at 08:22

## 2018-08-06 RX ADMIN — POTASSIUM CHLORIDE 40 MEQ: 1500 TABLET, EXTENDED RELEASE ORAL at 06:10

## 2018-08-06 RX ADMIN — SENNOSIDES 8.6 MG: 8.6 TABLET, FILM COATED ORAL at 20:23

## 2018-08-06 RX ADMIN — Medication 1 UNITS: at 18:57

## 2018-08-06 RX ADMIN — OXYCODONE HYDROCHLORIDE AND ACETAMINOPHEN 1 TABLET: 5; 325 TABLET ORAL at 10:14

## 2018-08-06 RX ADMIN — DOCUSATE SODIUM 100 MG: 100 CAPSULE, LIQUID FILLED ORAL at 10:14

## 2018-08-06 ASSESSMENT — PAIN DESCRIPTION - ORIENTATION
ORIENTATION: MID

## 2018-08-06 ASSESSMENT — PAIN DESCRIPTION - FREQUENCY
FREQUENCY: CONTINUOUS

## 2018-08-06 ASSESSMENT — PAIN DESCRIPTION - DESCRIPTORS
DESCRIPTORS: ACHING
DESCRIPTORS: DULL;ACHING
DESCRIPTORS: ACHING
DESCRIPTORS: ACHING

## 2018-08-06 ASSESSMENT — PAIN SCALES - GENERAL
PAINLEVEL_OUTOF10: 7
PAINLEVEL_OUTOF10: 5
PAINLEVEL_OUTOF10: 7
PAINLEVEL_OUTOF10: 6
PAINLEVEL_OUTOF10: 8
PAINLEVEL_OUTOF10: 7
PAINLEVEL_OUTOF10: 6
PAINLEVEL_OUTOF10: 8
PAINLEVEL_OUTOF10: 6
PAINLEVEL_OUTOF10: 8
PAINLEVEL_OUTOF10: 5
PAINLEVEL_OUTOF10: 8

## 2018-08-06 ASSESSMENT — PAIN DESCRIPTION - LOCATION
LOCATION: BACK
LOCATION: BACK;CHEST

## 2018-08-06 ASSESSMENT — PAIN DESCRIPTION - PAIN TYPE
TYPE: SURGICAL PAIN

## 2018-08-06 NOTE — PROGRESS NOTES
Covering Dr. Idalmis Manzanares day 3    S/P  bilateral pedicle screws in T5, T6, T7, T10 and T11 + posterior decompression at T8-T9    Dx: metastatic prostate cancer + spinal compression and paraplegia     Issue of abdomenal camping overnight No acute events overnight. Extubated yesterday   Vitals stable today   Open his eyes spontaneously   FC x4 with good strength in his upper extremities, in lower extremities ( strength around 1/5 grossly). No able to feel my touch below the knees level. Has babinski's sign in bothsides    Dressing: D/C/I      Drains minimal.    A/P:    Post Op day 3    S/P  bilateral pedicle screws in T5, T6, T7, T10 and T11 + posterior decompression at T8-T9    Dx: metastatic prostate cancer + spinal compression and paraplegia     -  Decrease the steroids to 3 mg q  6.  - Strict blood sugar control.   -  Keep the MAP at 80 from today .  -  PT and OT   - Up to the chair.  - D/C the right drain, the other drain to gravity( no suction). - OK for Levonex for DVT prophylaxis.   - aggressive bowel protocol.  - The case was discussed with ICU team.

## 2018-08-06 NOTE — PROGRESS NOTES
Assessment and Plan:        1. Paraplegia: Secondary to cord compression from tumor involving T7/T8 with fracture. Status post surgical debulking and repair 8/3/18. On high-dose steroids. Neurosurgery desires to keep a mean arterial pressure at 80 today. This will decrease to 75 tomorrow. 2. Metastatic prostate cancer: Previous surgical resection in 2014. 3. Diabetes mellitus: Subcutaneous insulin with sliding scale insulin. Discontinue insulin drip. 4. COPD: Asthmatic component. On systemic steroids. Treat with as needed albuterol. 5. Hypothyroidism: On Synthroid. 6. Morbid obesity: Aware. 7. Obstructive sleep apnea: Recommend BiPAP at night postoperatively. 8. Leukocytosis: Likely related to high-dose steroids. 9. Acute respiratory failure: Patient extubated 8/5/18. Resolved. CC:  Metastatic prostate cancer  HPI: The patient is a 26-year-old white male reformed smoker. He has a history of diabetes mellitus, COPD with asthmatic component, and hypothyroidism. He carries a diagnosis of sleep apnea. He has a history of prostate cancer that was diagnosed in 2014. He underwent prostatectomy in 2016. He had progressive back pain and had an MRI completed in May 2018 which showed degenerative disc disease. Pain continued to worsen and CT scan completed July 2018 showed sclerotic lesions and an MRI completed 7/26/18 showed extensive vertebral enhancing masses in the body of T7. This was associated with neuroforaminal stenosis compressing the nerve roots. Other enhancing lesions were noted at T6, and T8 through T12. Follow-up PSA was 292. He was started on systemic steroids. He was admitted on 8/1/18 for evaluation of possible surgery. He developed lower extremity weakness when he presented to the emergency room for admission. He had rapid neurologic decline with subsequent paralysis to the lower extremities bilaterally and had no pin prick sensation below the nipple.   However, he had no

## 2018-08-06 NOTE — PROGRESS NOTES
posterolaterally T8-9, transpedicular removal of T8 vertebral body involvement by tumor, bilateral sectioning of segmental nerve roots at T8, spine navigation, use of operating microscopy, interpretation of intraoperative CT using body TIARA by Dr. Lamonte Gama on 8/3/18. Over the last 24 hours: Patient is awake, laying in bed watching television. He was extubated on 8/5/18. He reports fatigue, shortness of breath, abdominal bloating. He denies lower extremity sensation, no movement noted. He denies chills, chest pain, nausea, vomiting. He is not passing flatus and has not had a bowel movement since 7/31/18. Khan catheter is in place.        Meds    Current Medications    senna  1 tablet Oral Nightly    docusate sodium  100 mg Oral Daily    polyethylene glycol  17 g Oral Daily    [START ON 8/7/2018] pantoprazole  40 mg Oral QAM AC    [START ON 8/7/2018] insulin glargine  30 Units Subcutaneous Daily    methylnaltrexone  8 mg Subcutaneous Q48H    dexamethasone  3 mg Intravenous Q6H    ipratropium-albuterol  1 ampule Inhalation Q4H WA    calcium replacement protocol   Other RX Placeholder    potassium (CARDIAC) replacement protocol   Other RX Placeholder    magnesium replacement protocol   Other RX Placeholder    DULoxetine  60 mg Oral Daily    nystatin  5 mL Oral 4x Daily    bicalutamide  50 mg Oral Daily    Fluticasone Furoate-Vilanterol  1 applicator Inhalation Daily    gabapentin  600 mg Oral TID    levothyroxine  50 mcg Oral Daily     oxyCODONE-acetaminophen, insulin regular, dextrose, fentanNYL, ondansetron, glucose, dextrose, glucagon (rDNA), dextrose, HYDROcodone-acetaminophen  IV Drips/Infusions   dextrose       Past Medical History         Diagnosis Date    Anxiety     Arthritis     Asthma     Cancer ([facility]     prostate    COPD (chronic obstructive pulmonary disease) ([facility]     Depression     [facility]     GERD (gastroesophageal reflux disease)     Neuropathy     and sagittal reconstructions were obtained. All CT scans at this facility use dose modulation, iterative reconstruction, and/or weight-based dosing when appropriate to reduce radiation dose to as low as reasonably achievable. FINDINGS:  There is basilar fibrolinear scarring left greater than right. The noncontrast appearance of the cardiac chambers, gallbladder, pancreas and spleen are negative for active pathology with mild fatty replacement of the liver as well as pancreas noted. There is no biliary obstruction. Left and right kidney demonstrate nonobstructing punctate calculi. There is trace contrast retained from previous images, correlate with mild perinephric stranding associated with nephritis. The small bowel and colon are negative for gross obstruction or inflammatory wall changes. A few scattered colonic diverticula changes are present. The ventral abdominal wall image 50 postsurgical mesh from hernia repair near the umbilicus. There is contrast within the urinary bladder. Postsurgical instrumentation of the lumbar sacral spine are present with disc graft fusion at L3, L4-5, and L5-S1. The T8 sclerotic lesion with compression deformities are again noted. Metastatic changes again are suspected. Additional sclerotic foci within several vertebral of the lower thoracic and lumbar spine persists considered with metastatic change to the skeleton. 1. T8 vertebral pathologic appearing fracture with sclerosis suggesting metastatic changes of the skeleton with additional lesions within the lower thoracic and lumbar segments. No obvious high-grade stenosis of the central canal visualized. Chronic degenerative changes of lumbar spine are visualized. Postsurgical changes of the ventral abdominal wall with visualized periumbilical mesh. Minimal colonic diverticulosis. **This report has been created using voice recognition software. It may contain minor errors which are inherent in voice recognition technology. ** achievable. FINDINGS: 3 Sets of axial images are submitted. There is posterior decompression at the T8 level. The overlying skin is open on these images. The posterior elements have been removed from T8 and T9. There are bilateral pedicle screws in T5, T6, T7, T10 and T11. The pedicle screws appear well-positioned. Intraoperative CT for surgical guidance. **This report has been created using voice recognition software. It may contain minor errors which are inherent in voice recognition technology. ** Final report electronically signed by Dr. Dorothe Lennox on 8/3/2018 2:25 PM    Ct Thoracic Spine Wo Contrast    Result Date: 8/2/2018  PROCEDURE: CT THORACIC SPINE WO CONTRAST CLINICAL INFORMATION: METASTASES TO SPINE, prostate cancer r/o mets. Worsening back pain. COMPARISON: Thoracic spine MRI 8/1/2018. TECHNIQUE: 3 mm axial noncontrast CT images were obtained to the thoracic spine. Sagittal and coronal reconstructions were obtained. All CT scans at this facility use dose modulation, iterative reconstruction, and/or weight-based dosing when appropriate to reduce radiation dose to as low as reasonably achievable. FINDINGS: There is a pathologic fracture through the T8 vertebral body. This is completely replaced by sclerotic metastasis. The fracture lines vertically oriented. The thoracic vertebral bodies are normally aligned. There is no tumor within the spinal canal at the T8 level. This is much better seen on MRI. This is not well demonstrated on CT. There is some dependent atelectasis in the lungs. There are nonobstructing right renal calculi. 1. Pathologic fracture of T8. This is pathologic secondary to underlying sclerotic metastasis. There is known extent in the posterior elements and spinal canal. This is better demonstrated on MR. 2. Additional sclerotic metastases are also present in T7 and T9. Additional metastases are present in the lower thoracic and lumbar lumbar bodies.  The metastases are better pathologic fractures are noted. **This report has been created using voice recognition software. It may contain minor errors which are inherent in voice recognition technology. ** Final report electronically signed by Dr. Torito Bee on 8/2/2018 10:09 AM    Mri Cervical Spine W Brittney Wolfgang Contrast    Addendum Date: 8/2/2018    ** ADDENDUM #1 ** Addendum:. Upon review of images, there is abnormal signal in the inferior left aspect of the C7 vertebral body. There is low signal on the T1 and T2-weighted images. This is consistent with a focal metastasis related to the patient's prostate cancer. The patient has a solid osseous fusion of C4-C7. On the axial images, at C2-3, there are bilateral facet degenerative changes. There is no spinal canal stenosis. There is mild left foraminal stenosis. At C3-4, there is a moderate-sized posterior disc osteophyte complex. There is moderate severity spinal canal and bilateral foraminal stenosis. At C4-C7, there has been fusion. There is no spinal canal stenosis. There is mild left-sided foraminal stenosis at the C5-6 level related to residual osteophytic ridging. At C7-T1, there is no spinal canal or foraminal stenosis. Final report electronically signed by Dr. Torito Bee on 8/2/2018 10:02 AM ** ORIGINAL REPORT ** PROCEDURE: MRI CERVICAL SPINE W WO CONTRAST CLINICAL INFORMATION: prostate cancer vertebral metastases . COMPARISON: No prior study. TECHNIQUE: Sagittal T1, T2 and STIR sequences were obtained through the cervical spine. Axial fast and echo and gradient echo T2-weighted images were obtained. Postcontrast axial and sagittal T1-weighted images were obtained. FINDINGS: The patient has had prior anterior interbody fusion from the levels of C4-C6. The cervical medullary junction appears intact. There is no acute compression deformities.  The regional soft tissues are grossly normal. Axial disc spaces: C2-3: The disc space, central canal and foramen are grossly normal. C3-4: There degenerative changes described above at individual levels. **This report has been created using voice recognition software. It may contain minor errors which are inherent in voice recognition technology. ** Final report electronically signed by Dr. Andres Carver on 8/2/2018 1:09 AM    Mri Thoracic Spine W Ana Fat Contrast    Addendum Date: 8/2/2018    ** ADDENDUM #1 ** PROCEDURE: MRI THORACIC SPINE W WO CONTRAST CLINICAL INFORMATION: prostate cancer vertebral metastases. Dull headache but is been constant for 2 weeks. Mid and lower back pain. Recently diagnosed with spinal metastases. Bilateral leg weakness. COMPARISON: Thoracic spine MRI 7/26/2018. TECHNIQUE: Sagittal T1, T2 and STIR sequences were obtained through the thoracic spine. Axial T2-weighted images were obtained through the discs. FINDINGS: There is diffuse abnormal signal throughout the T8 vertebral body. This is consistent with diffuse replacement by a metastasis. There is an associated pathologic fracture through the T8 vertebral body. Fracture lines are visible. There is associated soft  tissue mass/tumor extending into the prevertebral paraspinal soft tissues. There is also abnormal soft tissue density/tumor extending into the spinal canal and surrounding the thecal sac and thoracic spinal cord. On the postcontrast images, tumor is seen within the spinal canal. This surrounds and exhibits circumferential mass effect upon the thoracic spinal cord. Tumor extends into the neural foramen on the right at the T7-8 and T8-9 levels. Multiple additional metastases are present. There is near complete replacement of the T7 vertebral body. There is also tumor within the superior aspect of the T9 vertebral body. There is tumor in the posterior elements as well. Tumor is present in the spinous process of T8. There is also tumor the spinous process of T11. Small metastases are present in the T10-L2 vertebral bodies. There is a metastasis in the right second rib.  There CONTRAST CLINICAL INFORMATION: prostate cancer vertebral metastases. Dull headache. Mid and lower back pain. Recently diagnosed with spinal metastases. Bilateral leg weakness. COMPARISON: Lumbar spine MRI 5/8/2018. TECHNIQUE: Sagittal and axial T1 and T2-weighted images were obtained through the lumbar spine. FINDINGS: There is abnormal marrow signal in the left aspect of the L4 vertebral body. This extends in the left pedicle. There is low signal on the T1 and T2-weighted images. This has increased in size compared to the prior MRI. This is consistent with an enlarging metastasis. Sclerotic metastases are also present in the left aspect of L5. This extends into the left pedicle. There are also small scattered metastases in the sacrum and sacral ala. A metastasis within L2 has increased in size. There is also metastasis in the inferior endplates of U03 and L1. There is some mild bone marrow edema associated with the metastases in T11, T12 and L2. There is faint edema associated with the metastases in L4 and L5. There are no compression fractures. The distal spinal cord, conus medullaris and cauda equina are normal. On the axial images, there is no significant spinal canal stenosis at any level. There is degenerative disc disease. There is mild bilateral foraminal stenosis at the L1-2, L2-3 and L3-4 levels. There are no suspicious findings in the visualized aspects of the retroperitoneum and paraspinal soft tissues. Result Date: 8/2/2018  PROCEDURE: MRI LUMBAR SPINE W WO CONTRAST CLINICAL INFORMATION: prostate cancer vertebral metastases. COMPARISON: CT abdomen pelvis August 1, 2018 TECHNIQUE: Sagittal and axial T1 and T2-weighted images were obtained to the lumbar spine. Postcontrast axial and sagittal T1-weighted images were also obtained. FINDINGS: There are postsurgical changes of L3-L5 S1 with intervertebral disc grafts present.  There are 6 postsurgical soft tissue changes of the posterior paraspinal region Comparison Of Outside Films    Result Date: 8/1/2018  Radiology exam is complete. No Radiologist dictation. Please follow up with ordering provider. Ct Comparison Of Outside Films    Result Date: 8/1/2018  Radiology exam is complete. No Radiologist dictation. Please follow up with ordering provider. Xr Thoracic Spine 1 Vw    Result Date: 8/3/2018  PROCEDURE: XR THORACIC SPINE 1 VW, FLUORO FOR SURGICAL PROCEDURES CLINICAL INFORMATION: surgery thoracic, . COMPARISON: No prior study. TECHNIQUE: Portable fluoroscopic image FINDINGS:  Intraoperative mobile fluoroscopy utilized during image guidance for instrumentation of the thoracic spine. A single spot image was submitted. Surgical instruments are visualized over the field. Intraoperative imaging of the spine during instrumentation. **This report has been created using voice recognition software. It may contain minor errors which are inherent in voice recognition technology. ** Final report electronically signed by Dr. Marisela Dolan on 8/3/2018 6:34 AM    Fluoro For Surgical Procedures    Result Date: 8/3/2018  PROCEDURE: XR THORACIC SPINE 1 VW, FLUORO FOR SURGICAL PROCEDURES CLINICAL INFORMATION: surgery thoracic, . COMPARISON: No prior study. TECHNIQUE: Portable fluoroscopic image FINDINGS:  Intraoperative mobile fluoroscopy utilized during image guidance for instrumentation of the thoracic spine. A single spot image was submitted. Surgical instruments are visualized over the field. Intraoperative imaging of the spine during instrumentation. **This report has been created using voice recognition software. It may contain minor errors which are inherent in voice recognition technology. ** Final report electronically signed by Dr. Marisela Dolan on 8/3/2018 6:34 AM    Mri Brain W Wo Contrast    Addendum Date: 8/2/2018    ** ADDENDUM #1 ** There is some equivocal enhancement of the dura. This can represent normal variation for this patient.  Early dural metastases are not excluded. There is no abnormal enhancement in the brain parenchyma. Final report electronically signed by Dr. Noris Fountain on 8/2/2018 11:07 AM ** ORIGINAL REPORT ** PROCEDURE: MRI BRAIN W WO CONTRAST CLINICAL INFORMATIONprostate cancer vertebral metastases. COMPARISON: No prior study. TECHNIQUE: Multiplanar and multiple spin echo T1 and T2-weighted images were obtained through the brain before and after the administration of intravenous contrast. FINDINGS: The diffusion-weighted images are normal. The brain volume is compatible with age-related changes. There are no intra-or extra-axial collections. There is no hydrocephalus, midline shift or mass effect. On the FLAIR and T2-weighted sequences, there is normal signal intensity in the brain. On the gradient echo T2-weighted images, there is mineralization in the medial aspects of the basal ganglia. No other areas of susceptibility artifact are present. There is no abnormal enhancement in the brain. The major intracranial vascular flow voids are present. The midline craniocervical junction structures are normal.  The brainstem and pituitary gland are normal.     Result Date: 8/2/2018  PROCEDURE: MRI BRAIN W WO CONTRAST CLINICAL INFORMATIONprostate cancer vertebral metastases. COMPARISON: No prior study. TECHNIQUE: Multiplanar and multiple spin echo T1 and T2-weighted images were obtained through the brain before and after the administration of intravenous contrast. FINDINGS: The diffusion-weighted images are normal. The brain volume is compatible with age-related changes. There are no intra-or extra-axial collections. There is no hydrocephalus, midline shift or mass effect. On the FLAIR and T2-weighted sequences, there is normal signal intensity in the brain. On the gradient echo T2-weighted images, there is mineralization in the medial aspects of the basal ganglia. No other areas of susceptibility artifact are present.  There is no abnormal MERCEDEZ on 8/6/2018 at 1:02 PM

## 2018-08-06 NOTE — FLOWSHEET NOTE
08/06/18 1105   Provider Notification   Reason for Communication Evaluate;New orders  (PT/OT, Diet and CHANDRIKA drain orders. )   Provider Name Dr Marisa Holland    Provider Notification Physician   Method of Communication Secure Message   Response See orders   Called Dr. Marisa Holland because after discussing with appropriate staff members and Dr. Claude Hoguet, we felt that we should not be the ones to pull CHANDRIKA drain. Dr. Marisa Holland agreed and said he would be back to remove drain. Also asked about PT/OT and back brace. Sarah Cope for PT/OT to get patient moving. Will assess need for back brace later. Also received order to advance diet as tolerated.

## 2018-08-07 ENCOUNTER — APPOINTMENT (OUTPATIENT)
Dept: GENERAL RADIOLOGY | Age: 63
DRG: 518 | End: 2018-08-07
Payer: MEDICARE

## 2018-08-07 LAB
ALBUMIN SERPL-MCNC: 2.8 G/DL (ref 3.5–5.1)
ALP BLD-CCNC: 126 U/L (ref 38–126)
ALT SERPL-CCNC: 15 U/L (ref 11–66)
ANION GAP SERPL CALCULATED.3IONS-SCNC: 9 MEQ/L (ref 8–16)
AST SERPL-CCNC: 17 U/L (ref 5–40)
BASOPHILS # BLD: 0.1 %
BASOPHILS ABSOLUTE: 0 THOU/MM3 (ref 0–0.1)
BILIRUB SERPL-MCNC: 0.4 MG/DL (ref 0.3–1.2)
BUN BLDV-MCNC: 25 MG/DL (ref 7–22)
CALCIUM SERPL-MCNC: 8 MG/DL (ref 8.5–10.5)
CHLORIDE BLD-SCNC: 103 MEQ/L (ref 98–111)
CO2: 27 MEQ/L (ref 23–33)
CREAT SERPL-MCNC: 0.6 MG/DL (ref 0.4–1.2)
EOSINOPHIL # BLD: 0 %
EOSINOPHILS ABSOLUTE: 0 THOU/MM3 (ref 0–0.4)
ERYTHROCYTE [DISTWIDTH] IN BLOOD BY AUTOMATED COUNT: 13.7 % (ref 11.5–14.5)
ERYTHROCYTE [DISTWIDTH] IN BLOOD BY AUTOMATED COUNT: 46.6 FL (ref 35–45)
GFR SERPL CREATININE-BSD FRML MDRD: > 90 ML/MIN/1.73M2
GLUCOSE BLD-MCNC: 182 MG/DL (ref 70–108)
GLUCOSE BLD-MCNC: 193 MG/DL (ref 70–108)
GLUCOSE BLD-MCNC: 195 MG/DL (ref 70–108)
GLUCOSE BLD-MCNC: 206 MG/DL (ref 70–108)
GLUCOSE BLD-MCNC: 216 MG/DL (ref 70–108)
HCT VFR BLD CALC: 22.1 % (ref 42–52)
HEMOGLOBIN: 7.1 GM/DL (ref 14–18)
IMMATURE GRANS (ABS): 0.27 THOU/MM3 (ref 0–0.07)
IMMATURE GRANULOCYTES: 2.1 %
LYMPHOCYTES # BLD: 7.3 %
LYMPHOCYTES ABSOLUTE: 0.9 THOU/MM3 (ref 1–4.8)
MCH RBC QN AUTO: 30.6 PG (ref 26–33)
MCHC RBC AUTO-ENTMCNC: 32.1 GM/DL (ref 32.2–35.5)
MCV RBC AUTO: 95.3 FL (ref 80–94)
MONOCYTES # BLD: 7 %
MONOCYTES ABSOLUTE: 0.9 THOU/MM3 (ref 0.4–1.3)
NUCLEATED RED BLOOD CELLS: 2 /100 WBC
ORGANISM: ABNORMAL
PLATELET # BLD: 175 THOU/MM3 (ref 130–400)
PMV BLD AUTO: 11.1 FL (ref 9.4–12.4)
POTASSIUM SERPL-SCNC: 4.4 MEQ/L (ref 3.5–5.2)
RBC # BLD: 2.32 MILL/MM3 (ref 4.7–6.1)
SEG NEUTROPHILS: 83.5 %
SEGMENTED NEUTROPHILS ABSOLUTE COUNT: 10.6 THOU/MM3 (ref 1.8–7.7)
SODIUM BLD-SCNC: 139 MEQ/L (ref 135–145)
TOTAL CK: 214 U/L (ref 55–170)
TOTAL PROTEIN: 5.1 G/DL (ref 6.1–8)
VRE CULTURE: ABNORMAL
WBC # BLD: 12.7 THOU/MM3 (ref 4.8–10.8)

## 2018-08-07 PROCEDURE — 97162 PT EVAL MOD COMPLEX 30 MIN: CPT

## 2018-08-07 PROCEDURE — 80053 COMPREHEN METABOLIC PANEL: CPT

## 2018-08-07 PROCEDURE — 6370000000 HC RX 637 (ALT 250 FOR IP): Performed by: NURSE PRACTITIONER

## 2018-08-07 PROCEDURE — 99223 1ST HOSP IP/OBS HIGH 75: CPT | Performed by: PHYSICAL MEDICINE & REHABILITATION

## 2018-08-07 PROCEDURE — 6370000000 HC RX 637 (ALT 250 FOR IP): Performed by: PHYSICIAN ASSISTANT

## 2018-08-07 PROCEDURE — G8987 SELF CARE CURRENT STATUS: HCPCS

## 2018-08-07 PROCEDURE — C9113 INJ PANTOPRAZOLE SODIUM, VIA: HCPCS | Performed by: INTERNAL MEDICINE

## 2018-08-07 PROCEDURE — 2060000000 HC ICU INTERMEDIATE R&B

## 2018-08-07 PROCEDURE — 6370000000 HC RX 637 (ALT 250 FOR IP): Performed by: INTERNAL MEDICINE

## 2018-08-07 PROCEDURE — G8979 MOBILITY GOAL STATUS: HCPCS

## 2018-08-07 PROCEDURE — 97530 THERAPEUTIC ACTIVITIES: CPT

## 2018-08-07 PROCEDURE — 85025 COMPLETE CBC W/AUTO DIFF WBC: CPT

## 2018-08-07 PROCEDURE — 36415 COLL VENOUS BLD VENIPUNCTURE: CPT

## 2018-08-07 PROCEDURE — 6360000002 HC RX W HCPCS: Performed by: INTERNAL MEDICINE

## 2018-08-07 PROCEDURE — G8978 MOBILITY CURRENT STATUS: HCPCS

## 2018-08-07 PROCEDURE — 82948 REAGENT STRIP/BLOOD GLUCOSE: CPT

## 2018-08-07 PROCEDURE — 2709999900 HC NON-CHARGEABLE SUPPLY

## 2018-08-07 PROCEDURE — 99024 POSTOP FOLLOW-UP VISIT: CPT | Performed by: NEUROLOGICAL SURGERY

## 2018-08-07 PROCEDURE — APPSS180 APP SPLIT SHARED TIME > 60 MINUTES: Performed by: NURSE PRACTITIONER

## 2018-08-07 PROCEDURE — 6360000002 HC RX W HCPCS: Performed by: NEUROLOGICAL SURGERY

## 2018-08-07 PROCEDURE — 2700000000 HC OXYGEN THERAPY PER DAY

## 2018-08-07 PROCEDURE — 97167 OT EVAL HIGH COMPLEX 60 MIN: CPT

## 2018-08-07 PROCEDURE — 1200000003 HC TELEMETRY R&B

## 2018-08-07 PROCEDURE — 94640 AIRWAY INHALATION TREATMENT: CPT

## 2018-08-07 PROCEDURE — 74018 RADEX ABDOMEN 1 VIEW: CPT

## 2018-08-07 PROCEDURE — G8988 SELF CARE GOAL STATUS: HCPCS

## 2018-08-07 PROCEDURE — 82550 ASSAY OF CK (CPK): CPT

## 2018-08-07 PROCEDURE — 97110 THERAPEUTIC EXERCISES: CPT

## 2018-08-07 PROCEDURE — 6370000000 HC RX 637 (ALT 250 FOR IP): Performed by: PHYSICAL MEDICINE & REHABILITATION

## 2018-08-07 RX ORDER — CYCLOBENZAPRINE HCL 10 MG
5 TABLET ORAL 3 TIMES DAILY PRN
Status: DISCONTINUED | OUTPATIENT
Start: 2018-08-07 | End: 2018-08-07

## 2018-08-07 RX ORDER — FUROSEMIDE 10 MG/ML
10 INJECTION INTRAMUSCULAR; INTRAVENOUS EVERY 8 HOURS
Status: DISCONTINUED | OUTPATIENT
Start: 2018-08-07 | End: 2018-08-14

## 2018-08-07 RX ORDER — GABAPENTIN 600 MG/1
1800 TABLET ORAL DAILY
Status: DISCONTINUED | OUTPATIENT
Start: 2018-08-08 | End: 2018-08-10

## 2018-08-07 RX ORDER — TIZANIDINE 4 MG/1
4 TABLET ORAL 3 TIMES DAILY
Status: ON HOLD | COMMUNITY
End: 2018-08-21 | Stop reason: HOSPADM

## 2018-08-07 RX ORDER — BISACODYL 10 MG
10 SUPPOSITORY, RECTAL RECTAL DAILY
Status: DISCONTINUED | OUTPATIENT
Start: 2018-08-07 | End: 2018-08-18 | Stop reason: ALTCHOICE

## 2018-08-07 RX ORDER — ROPINIROLE 4 MG/1
4 TABLET, FILM COATED ORAL 2 TIMES DAILY
Status: ON HOLD | COMMUNITY
End: 2018-08-21 | Stop reason: HOSPADM

## 2018-08-07 RX ORDER — METFORMIN HYDROCHLORIDE 500 MG/1
1000 TABLET, EXTENDED RELEASE ORAL NIGHTLY
Status: ON HOLD | COMMUNITY
End: 2018-08-21 | Stop reason: HOSPADM

## 2018-08-07 RX ORDER — GABAPENTIN 600 MG/1
1200 TABLET ORAL 2 TIMES DAILY
Status: DISCONTINUED | OUTPATIENT
Start: 2018-08-07 | End: 2018-08-10

## 2018-08-07 RX ORDER — DEXAMETHASONE SODIUM PHOSPHATE 4 MG/ML
3 INJECTION, SOLUTION INTRA-ARTICULAR; INTRALESIONAL; INTRAMUSCULAR; INTRAVENOUS; SOFT TISSUE EVERY 8 HOURS
Status: DISCONTINUED | OUTPATIENT
Start: 2018-08-07 | End: 2018-08-08

## 2018-08-07 RX ORDER — DULOXETIN HYDROCHLORIDE 60 MG/1
60 CAPSULE, DELAYED RELEASE ORAL DAILY
Status: ON HOLD | COMMUNITY
End: 2018-08-21 | Stop reason: HOSPADM

## 2018-08-07 RX ORDER — GABAPENTIN 600 MG/1
1800 TABLET ORAL DAILY
Status: ON HOLD | COMMUNITY
End: 2018-08-21 | Stop reason: HOSPADM

## 2018-08-07 RX ORDER — BACLOFEN 10 MG/1
10 TABLET ORAL 3 TIMES DAILY
Status: DISCONTINUED | OUTPATIENT
Start: 2018-08-07 | End: 2018-08-18 | Stop reason: ALTCHOICE

## 2018-08-07 RX ORDER — OMEPRAZOLE 40 MG/1
40 CAPSULE, DELAYED RELEASE ORAL DAILY
Status: ON HOLD | COMMUNITY
End: 2018-08-21 | Stop reason: HOSPADM

## 2018-08-07 RX ORDER — GABAPENTIN 600 MG/1
1800 TABLET ORAL 2 TIMES DAILY
Status: DISCONTINUED | OUTPATIENT
Start: 2018-08-07 | End: 2018-08-07

## 2018-08-07 RX ORDER — PANTOPRAZOLE SODIUM 40 MG/10ML
40 INJECTION, POWDER, LYOPHILIZED, FOR SOLUTION INTRAVENOUS DAILY
Status: DISCONTINUED | OUTPATIENT
Start: 2018-08-07 | End: 2018-08-08

## 2018-08-07 RX ADMIN — DEXAMETHASONE SODIUM PHOSPHATE 3 MG: 4 INJECTION, SOLUTION INTRA-ARTICULAR; INTRALESIONAL; INTRAMUSCULAR; INTRAVENOUS; SOFT TISSUE at 21:19

## 2018-08-07 RX ADMIN — METHYLNALTREXONE BROMIDE 12 MG: 12 INJECTION, SOLUTION SUBCUTANEOUS at 16:12

## 2018-08-07 RX ADMIN — GABAPENTIN 600 MG: 600 TABLET, FILM COATED ORAL at 13:36

## 2018-08-07 RX ADMIN — INSULIN LISPRO 1 UNITS: 100 INJECTION, SOLUTION INTRAVENOUS; SUBCUTANEOUS at 21:19

## 2018-08-07 RX ADMIN — IPRATROPIUM BROMIDE AND ALBUTEROL SULFATE 1 AMPULE: .5; 3 SOLUTION RESPIRATORY (INHALATION) at 07:58

## 2018-08-07 RX ADMIN — INSULIN GLARGINE 30 UNITS: 100 INJECTION, SOLUTION SUBCUTANEOUS at 10:05

## 2018-08-07 RX ADMIN — IPRATROPIUM BROMIDE AND ALBUTEROL SULFATE 1 AMPULE: .5; 3 SOLUTION RESPIRATORY (INHALATION) at 20:43

## 2018-08-07 RX ADMIN — BISACODYL 10 MG: 10 SUPPOSITORY RECTAL at 21:19

## 2018-08-07 RX ADMIN — FUROSEMIDE 10 MG: 10 INJECTION, SOLUTION INTRAMUSCULAR; INTRAVENOUS at 16:18

## 2018-08-07 RX ADMIN — NYSTATIN 500000 UNITS: 100000 SUSPENSION ORAL at 13:36

## 2018-08-07 RX ADMIN — HYDROCODONE BITARTRATE AND ACETAMINOPHEN 1 TABLET: 10; 325 TABLET ORAL at 13:39

## 2018-08-07 RX ADMIN — HYDROCODONE BITARTRATE AND ACETAMINOPHEN 1 TABLET: 10; 325 TABLET ORAL at 04:25

## 2018-08-07 RX ADMIN — POLYETHYLENE GLYCOL 3350 17 G: 17 POWDER, FOR SOLUTION ORAL at 10:16

## 2018-08-07 RX ADMIN — DOCUSATE SODIUM 100 MG: 100 CAPSULE, LIQUID FILLED ORAL at 10:16

## 2018-08-07 RX ADMIN — GABAPENTIN 600 MG: 600 TABLET, FILM COATED ORAL at 09:58

## 2018-08-07 RX ADMIN — PANTOPRAZOLE SODIUM 40 MG: 40 INJECTION, POWDER, FOR SOLUTION INTRAVENOUS at 11:28

## 2018-08-07 RX ADMIN — BICALUTAMIDE 50 MG: 50 TABLET, FILM COATED ORAL at 10:00

## 2018-08-07 RX ADMIN — FUROSEMIDE 10 MG: 10 INJECTION, SOLUTION INTRAMUSCULAR; INTRAVENOUS at 10:12

## 2018-08-07 RX ADMIN — DULOXETINE HYDROCHLORIDE 60 MG: 60 CAPSULE, DELAYED RELEASE ORAL at 10:16

## 2018-08-07 RX ADMIN — CYCLOBENZAPRINE HYDROCHLORIDE 5 MG: 10 TABLET, FILM COATED ORAL at 11:05

## 2018-08-07 RX ADMIN — PANTOPRAZOLE SODIUM 40 MG: 40 TABLET, DELAYED RELEASE ORAL at 07:07

## 2018-08-07 RX ADMIN — BACLOFEN 10 MG: 10 TABLET ORAL at 22:54

## 2018-08-07 RX ADMIN — ONDANSETRON 4 MG: 2 INJECTION INTRAMUSCULAR; INTRAVENOUS at 10:06

## 2018-08-07 RX ADMIN — IPRATROPIUM BROMIDE AND ALBUTEROL SULFATE 1 AMPULE: .5; 3 SOLUTION RESPIRATORY (INHALATION) at 17:09

## 2018-08-07 RX ADMIN — DEXAMETHASONE SODIUM PHOSPHATE 3 MG: 4 INJECTION, SOLUTION INTRAMUSCULAR; INTRAVENOUS at 07:07

## 2018-08-07 RX ADMIN — NYSTATIN 500000 UNITS: 100000 SUSPENSION ORAL at 16:16

## 2018-08-07 RX ADMIN — DEXAMETHASONE SODIUM PHOSPHATE 3 MG: 4 INJECTION, SOLUTION INTRAMUSCULAR; INTRAVENOUS at 00:45

## 2018-08-07 RX ADMIN — Medication 1 UNITS: at 18:00

## 2018-08-07 RX ADMIN — IPRATROPIUM BROMIDE AND ALBUTEROL SULFATE 1 AMPULE: .5; 3 SOLUTION RESPIRATORY (INHALATION) at 12:15

## 2018-08-07 RX ADMIN — NYSTATIN 500000 UNITS: 100000 SUSPENSION ORAL at 21:19

## 2018-08-07 RX ADMIN — NYSTATIN 500000 UNITS: 100000 SUSPENSION ORAL at 09:59

## 2018-08-07 RX ADMIN — Medication 1 UNITS: at 09:48

## 2018-08-07 RX ADMIN — SENNOSIDES 8.6 MG: 8.6 TABLET, FILM COATED ORAL at 21:43

## 2018-08-07 RX ADMIN — LEVOTHYROXINE SODIUM 50 MCG: 50 TABLET ORAL at 07:07

## 2018-08-07 RX ADMIN — GABAPENTIN 1200 MG: 600 TABLET, FILM COATED ORAL at 21:19

## 2018-08-07 RX ADMIN — Medication 2 UNITS: at 13:34

## 2018-08-07 ASSESSMENT — PAIN SCALES - GENERAL
PAINLEVEL_OUTOF10: 4
PAINLEVEL_OUTOF10: 5
PAINLEVEL_OUTOF10: 7
PAINLEVEL_OUTOF10: 5

## 2018-08-07 NOTE — PLAN OF CARE
prep applied. Will continue to follow. Care plan reviewed with patient and RN. Patient and RN verbalize understanding of the plan of care and contribute to goal setting.       Comments:

## 2018-08-07 NOTE — PROGRESS NOTES
IM Progress Note  Dr. Glenn Solano  8/7/2018 8:30 AM      Patient name Denia Huang  [id number]  PCP: No primary care provider on file. Admit Date: 8/1/2018  Acct No. [de-identified]    Subjective: Interval History:   Off pressors  Transferred out of ICU  Awake  No SOB  Using his CPAP  No BM yet     Diet: DIET CLEAR LIQUID;    I/O last 3 completed shifts: In: 1319.9 [P.O.:1245; I.V.:74.9]  Out: 960 [Urine:950; Drains:10]  No intake/output data recorded. Admission weight: 279 lb (126.6 kg) as of 8/1/2018  5:04 PM  Wt Readings from Last 3 Encounters:   08/07/18 299 lb 1 oz (135.7 kg)     Body mass index is 41.71 kg/m².           Medications:   Scheduled Meds:   furosemide  10 mg Intravenous Q8H    senna  1 tablet Oral Nightly    docusate sodium  100 mg Oral Daily    polyethylene glycol  17 g Oral Daily    pantoprazole  40 mg Oral QAM AC    insulin glargine  30 Units Subcutaneous Daily    methylnaltrexone  0.15 mg/kg Subcutaneous Q48H    insulin lispro  0-6 Units Subcutaneous TID WC    insulin lispro  0-3 Units Subcutaneous Nightly    dexamethasone  3 mg Intravenous Q6H    ipratropium-albuterol  1 ampule Inhalation Q4H WA    calcium replacement protocol   Other RX Placeholder    potassium (CARDIAC) replacement protocol   Other RX Placeholder    magnesium replacement protocol   Other RX Placeholder    DULoxetine  60 mg Oral Daily    nystatin  5 mL Oral 4x Daily    bicalutamide  50 mg Oral Daily    Fluticasone Furoate-Vilanterol  1 applicator Inhalation Daily    gabapentin  600 mg Oral TID    levothyroxine  50 mcg Oral Daily     Continuous Infusions:   dextrose         Labs :     CBC:   Recent Labs      08/05/18   0540  08/05/18   1645  08/06/18   0318  08/07/18   0420   WBC  14.1*   --   15.9*  12.7*   HGB  7.7*  7.7*  7.6*  7.1*   PLT  174   --   172  175     BMP:    Recent Labs      08/05/18   0540  08/06/18   0318  08/07/18   0420   NA  141  144  139   K  3.9  3.4*  4.4   CL  107  106

## 2018-08-07 NOTE — PROGRESS NOTES
SPINE SURGERY      NASAL SEPTUM SURGERY      FL OFFICE/OUTPT VISIT,PROCEDURE ONLY N/A 8/2/2018    TRANSPEDICULAR VERTEBRECTOMY AND CAGE INSERTION OF T8 WITH POSTERIOR INSTRUMENTATION OF THORACIC SPINE AND TUMOR REMOVAL performed by Yosi Whitney MD at [address] [facility]  Chart Reviewed: Yes (orders, progress notes)  Patient assessed for rehabilitation services?: Yes  Family / Caregiver Present: No    Subjective: cooperative, Pt reporting nausea earlier. Pt agreeable to try. Pt reported to be hot & sweaty, reports he feels his blood sugar is high-nurse in room when Pt reported this    General:       Vision: Within Functional Limits    Hearing: Within functional limits         Pain:  Pain Assessment  Patient Currently in Pain: Yes (back)  Pain Level: 7       Social/Functional History:  Lives With: Family (wife & 2 grandchildren (15 & 14 yo))  Type of Home: House  Home Layout: One level  Home Access: Stairs to enter with rails (3)  Home Equipment: Cane     Bathroom Shower/Tub: Walk-in shower, Shower chair with back  Bathroom Toilet: Handicap height  Bathroom Equipment: Grab bars in shower       ADL Assistance: Independent  Homemaking Assistance: Independent       Ambulation Assistance: Independent  Transfer Assistance: Independent    Active : Yes  Occupation: Retired  Additional Comments: Pt reports he was ambulating without AD PLOF. Pt reports pain, numbness, & unsteadiness with mobility at home since April 2018.      Objective        Overall Cognitive Status: WNL         Sensation  Overall Sensation Status: WNL (BUE)                    LUE AROM (degrees)  LUE AROM : WNL          RUE AROM (degrees)  RUE AROM : WNL       LUE Strength  Gross LUE Strength:  (NT d/t back precautions)        RUE Strength  Gross RUE Strength:  (NT d/t back precautions)           ADL  LE Dressing: Dependent/Total (for donning slipper socks)     Bed mobility  Supine to Sit: Maximum assistance (increased

## 2018-08-07 NOTE — PROGRESS NOTES
INSERTION OF T8 WITH POSTERIOR INSTRUMENTATION OF THORACIC SPINE AND TUMOR REMOVAL performed by Galen Gonzales MD at [address]         Restrictions/Precautions:  Fall Risk  Spinal Precautions: No Bending, No Lifting, No Twisting  Other position/activity restrictions: CHANDRIKA drain       Subjective:  Chart Reviewed: Yes  Patient assessed for rehabilitation services?: Yes  Family / Caregiver Present: Yes  Subjective: pleasant and cooperative    General:       Vision: Within Functional Limits  Hearing: Within functional limits         Pain:  Yes. Pain Assessment  Pain Level: 4 (stomach, 5/10 back per pt, inc back pain to 9/10 with mobility)       Social/Functional History:    Lives With: Family (wife & 2 grandchildren (15 & 14 yo))  Type of Home: House  Home Layout: One level  Home Access: Stairs to enter with rails (3)  Home Equipment: Cane   Bathroom Shower/Tub: Walk-in shower, Shower chair with back  Bathroom Toilet: Handicap height  Bathroom Equipment: Grab bars in shower  ADL Assistance: Independent  Homemaking Assistance: Independent  Ambulation Assistance: Independent  Transfer Assistance: Independent  Active : Yes  Occupation: Retired  Additional Comments: Pt reports he was ambulating without AD PLOF. Pt reports pain, numbness, & unsteadiness with mobility at home since April 2018.        Objective:  RLE PROM: WFL  RLE General PROM: pt with extensor tone noted and spontaneous muscle spasms however no active movement    LLE PROM: WFL  LLE General PROM: pt with extensor tone noted and spontaneous muscle spasms however no active movement           Strength RLE: Exception  Comment: spontaneous movement but no active movement    Strength LLE: Exception  Comment: spontaneous movement but no active movement      Sensation  Overall Sensation Status: WNL (BUE)      Balance  Sitting - Static: Fair  Sitting - Dynamic: Poor  Comments: pt sat edge of bed around 13 min with BUE support was CGAx2 occasional needs    Safety:  Type of devices: All fall risk precautions in place, Bed alarm in place, Call light within reach, Patient at risk for falls, Left in bed, Nurse notified    Plan:  Times per week: 5-6X N/O  Times per day: Daily  Specific instructions for Next Treatment: therex, bed mobility, sitting balance, PT to assess transfers  Current Treatment Recommendations: Strengthening, ROM, Functional Mobility Training, Balance Training, Endurance Training, Home Exercise Program, Safety Education & Training, Patient/Caregiver Education & Training, Pain Management, Equipment Evaluation, Education, & procurement, Neuromuscular Re-education    Goals:  Patient goals : less pain  Short term goals  Time Frame for Short term goals: 2 weeks  Short term goal 1: bed mobility with modAx2 to get in/out of bed  Short term goal 2: tolerate >10 min dyn sitting balance with 0-1 UE support and </=minAx1 to demonstrate inc trunk control for progression to transfers  Short term goal 3: PT to assess transfers  Long term goals  Time Frame for Long term goals : no LTGs set secondary to short ELOS    Evaluation Complexity: Based on the findings of patient history, examination, clinical presentation, and decision making during this evaluation, the evaluation of Ruma See  is of medium complexity. PT G-Codes  Functional Limitation: Mobility: Walking and moving around  Mobility: Walking and Moving Around Current Status (): 100 percent impaired, limited or restricted  Mobility: Walking and Moving Around Goal Status ():  At least 80 percent but less than 100 percent impaired, limited or restricted       AM-PAC Inpatient Mobility without Stair Climbing Raw Score : 5  AM-PAC Inpatient without Stair Climbing T-Scale Score : 23.59  Mobility Inpatient CMS 0-100% Score: 100  Mobility Inpatient without Stair CMS G-Code Modifier : CN

## 2018-08-07 NOTE — CONSULTS
500,000 Units at 08/07/18 1616    bicalutamide (CASODEX) chemo tablet 50 mg, 50 mg, Oral, Daily, Shira Sanchez MD, 50 mg at 08/07/18 1000    Fluticasone Furoate-Vilanterol 200-25 MCG/INH AEPB 1 applicator, 1 applicator, Inhalation, Daily, Graciela Brody MD, Stopped at 08/02/18 1104    HYDROcodone-acetaminophen (NORCO)  MG per tablet 1 tablet, 1 tablet, Oral, Q6H PRN, Graciela Brody MD, 1 tablet at 08/07/18 1339    levothyroxine (SYNTHROID) tablet 50 mcg, 50 mcg, Oral, Daily, Graciela Brody MD, 50 mcg at 08/07/18 0707    Vital Signs:  Vitals:    08/07/18 1622   BP: 115/65   Pulse: 93   Resp: 17   Temp: 98.5 °F (36.9 °C)   SpO2: 95%       Weight:  Wt Readings from Last 3 Encounters:   08/07/18 299 lb 1 oz (135.7 kg)       Physical Exam:  General Appearance:  awake, alert, oriented, in no acute distress, well developed, well nourished and obese  male. Pt is lying comfortably in bed. HEENT: Atraumatic, Pupils reactive, No pallor/icterus. Oral mucosa moist/No thrush  Neck: No thyroid enlargement, No cervical or supraclavicular lymphaedenopathy  CVS: Regular rate and rhythm, No murmurs. No rubs or gallops  RS: Good b/l air entry, Clear to auscultation b/l  Abd: Taut, diffuse tenderness, distended, no visible veins, scars, No hepatosplenomegaly or palpable masses, bowel sounds active x 4, tinkling. Increased tympany t/o. Ext: No clubbing, cyanosis, edema. CNS: alert, oriented, Lower extremities flaccid, w/o sensation.     Labs:   Lab Results   Component Value Date    WBC 12.7 08/07/2018    HGB 7.1 08/07/2018    HCT 22.1 08/07/2018    MCV 95.3 08/07/2018     08/07/2018     Lab Results   Component Value Date     08/07/2018    K 4.4 08/07/2018     08/07/2018    CO2 27 08/07/2018    BUN 25 08/07/2018    CREATININE 0.6 08/07/2018    GLUCOSE 193 08/07/2018    CALCIUM 8.0 08/07/2018     Lab Results   Component Value Date    ALKPHOS 126 08/07/2018    ALT 15 images were obtained through the abdomen and pelvis before and after the administration of intravenous and oral contrast. Coronal and sagittal reconstructions were obtained.   All CT scans at this facility use dose modulation, iterative reconstruction, and/or weight-based dosing when appropriate to reduce radiation dose to as low as reasonably achievable.     FINDINGS:  Daniel Rouse is basilar fibrolinear scarring left greater than right. The noncontrast appearance of the cardiac chambers, gallbladder, pancreas and spleen are negative for active pathology with mild fatty replacement of the liver as well as pancreas noted. There is no biliary obstruction. Left and right kidney demonstrate nonobstructing punctate calculi. There is trace contrast retained from previous images, correlate with mild perinephric stranding associated with nephritis. The small bowel and colon are   negative for gross obstruction or inflammatory wall changes. A few scattered colonic diverticula changes are present. The ventral abdominal wall image 50 postsurgical mesh from hernia repair near the umbilicus.   There is contrast within the urinary bladder. Postsurgical instrumentation of the lumbar sacral spine are present with disc graft fusion at L3, L4-5, and L5-S1.   The T8 sclerotic lesion with compression deformities are again noted. Metastatic changes again are suspected. Additional sclerotic foci within several vertebral of the lower thoracic and lumbar spine persists considered with metastatic change to the   skeleton.      Impression  1. T8 vertebral pathologic appearing fracture with sclerosis suggesting metastatic changes of the skeleton with additional lesions within the lower thoracic and lumbar segments.   No obvious high-grade stenosis of the central canal visualized.   Chronic degenerative changes of lumbar spine are visualized.   Postsurgical changes of the ventral abdominal wall with visualized periumbilical mesh.   Minimal colonic diverticulosis.     **This report has been created using voice recognition software. It may contain minor errors which are inherent in voice recognition technology. **     Final report electronically signed by Dr. Aurelia Anderson on 8/1/2018 11:41 PM      ASSESSMENT AND PLAN:  1. Severe post operative ileus - compounded by immobility from paraplegia. Continue Relistor. continue Miralax and start dulcolax suppository with rectal stimulation daily. Repeat KUB in am.    2.  Intermittent Nausea - r/t #1. If persists or pt begins vomiting, insert NGT to LIWS. Anti-emetics prn.    3.  Metastatic prostate cancer to thoracic spine, causing spinal cord compression and acute onset paraplegia. S/p debulking surgery per Neurosurgery. Continue steroids per Neuro. 4.  Anemia - ? Dilutional vs acute blood loss secondary to spinal surgery. Continue to monitor and transfuse prn. If persists or worsens, may benefit from endoscopic evaluation to r/o GI source. 5. Hx DM - stable. 6.  Hx hypothyroidism - stable  7. Hx COPD - stable  8. Hx ED - stable. Thank You Dr. Luis Roberts MD for allowing me to participate in the care of this patient. Assessment and POC were discussed with Dr Bijan Whitfield.     Time In Room:  1615  Time Out Room:  1635  Total Dictation Time:  25 min    CINTIA Griggs - CNP  8/7/2018  4:36 PM     KUB in am  If not improve tomorrow will start neostigmine     Patient is seen independently from the nurse practitioner and all  the pertinent data along with physical examination and assessment and plans are all obtained by my self and  Laboratory data, Radiology results, medications all are reviewed by my self and care is discussed extensively with the patient  and the patients nurse and all agree with plan and in addition see orders and plans    Electronically signed by Angel Neely MD on 8/7/2018 at 9:03 PM

## 2018-08-07 NOTE — CARE COORDINATION
8/7/18, 10:24 AM      Ej Vera day: 6  Location: -12/012-A Reason for admit: Cord compression [facility]) [G95.20]   Procedure: KUB    Impression:       1.  There is mild gaseous distention of the colon and gas within several nondistended loops of small bowel which in the right clinical setting could represent an ileus. There is a small amount stool within the ascending colon. Treatment Plan of Care: PT/OT, PM&R consult, GI Consult for possible ileus  PCP: No primary care provider on file.   Readmission Risk Score: 24%  Discharge Plan: Rehab consult placed

## 2018-08-08 ENCOUNTER — APPOINTMENT (OUTPATIENT)
Dept: GENERAL RADIOLOGY | Age: 63
DRG: 518 | End: 2018-08-08
Payer: MEDICARE

## 2018-08-08 LAB
ABO: NORMAL
ANION GAP SERPL CALCULATED.3IONS-SCNC: 10 MEQ/L (ref 8–16)
ANISOCYTOSIS: PRESENT
ANTIBODY SCREEN: NORMAL
BASOPHILIC STIPPLING: ABNORMAL
BASOPHILS # BLD: 0 %
BASOPHILS ABSOLUTE: 0 THOU/MM3 (ref 0–0.1)
BUN BLDV-MCNC: 22 MG/DL (ref 7–22)
CALCIUM SERPL-MCNC: 7.1 MG/DL (ref 8.5–10.5)
CHLORIDE BLD-SCNC: 100 MEQ/L (ref 98–111)
CO2: 28 MEQ/L (ref 23–33)
CREAT SERPL-MCNC: 0.6 MG/DL (ref 0.4–1.2)
DIFFERENTIAL TYPE: ABNORMAL
EOSINOPHIL # BLD: 0 %
EOSINOPHILS ABSOLUTE: 0 THOU/MM3 (ref 0–0.4)
ERYTHROCYTE [DISTWIDTH] IN BLOOD BY AUTOMATED COUNT: 13.8 % (ref 11.5–14.5)
ERYTHROCYTE [DISTWIDTH] IN BLOOD BY AUTOMATED COUNT: 45.4 FL (ref 35–45)
GFR SERPL CREATININE-BSD FRML MDRD: > 90 ML/MIN/1.73M2
GLUCOSE BLD-MCNC: 155 MG/DL (ref 70–108)
GLUCOSE BLD-MCNC: 167 MG/DL (ref 70–108)
GLUCOSE BLD-MCNC: 169 MG/DL (ref 70–108)
GLUCOSE BLD-MCNC: 190 MG/DL (ref 70–108)
HCT VFR BLD CALC: 21.2 % (ref 42–52)
HEMOGLOBIN: 6.9 GM/DL (ref 14–18)
LYMPHOCYTES # BLD: 8 %
LYMPHOCYTES ABSOLUTE: 1 THOU/MM3 (ref 1–4.8)
MCH RBC QN AUTO: 30.9 PG (ref 26–33)
MCHC RBC AUTO-ENTMCNC: 32.5 GM/DL (ref 32.2–35.5)
MCV RBC AUTO: 95.1 FL (ref 80–94)
METAMYELOCYTES: 2 %
MONOCYTES # BLD: 12 %
MONOCYTES ABSOLUTE: 1.6 THOU/MM3 (ref 0.4–1.3)
NUCLEATED RED BLOOD CELLS: 0 /100 WBC
PATHOLOGIST REVIEW: ABNORMAL
PLATELET # BLD: 200 THOU/MM3 (ref 130–400)
PMV BLD AUTO: 10.9 FL (ref 9.4–12.4)
POIKILOCYTES: ABNORMAL
POTASSIUM SERPL-SCNC: 3.6 MEQ/L (ref 3.5–5.2)
RBC # BLD: 2.23 MILL/MM3 (ref 4.7–6.1)
RH FACTOR: NORMAL
SEG NEUTROPHILS: 78 %
SEGMENTED NEUTROPHILS ABSOLUTE COUNT: 10.1 THOU/MM3 (ref 1.8–7.7)
SODIUM BLD-SCNC: 138 MEQ/L (ref 135–145)
WBC # BLD: 13 THOU/MM3 (ref 4.8–10.8)

## 2018-08-08 PROCEDURE — 97110 THERAPEUTIC EXERCISES: CPT

## 2018-08-08 PROCEDURE — 94640 AIRWAY INHALATION TREATMENT: CPT

## 2018-08-08 PROCEDURE — 74018 RADEX ABDOMEN 1 VIEW: CPT

## 2018-08-08 PROCEDURE — 86901 BLOOD TYPING SEROLOGIC RH(D): CPT

## 2018-08-08 PROCEDURE — 6360000002 HC RX W HCPCS: Performed by: NEUROLOGICAL SURGERY

## 2018-08-08 PROCEDURE — 6370000000 HC RX 637 (ALT 250 FOR IP): Performed by: INTERNAL MEDICINE

## 2018-08-08 PROCEDURE — 71045 X-RAY EXAM CHEST 1 VIEW: CPT

## 2018-08-08 PROCEDURE — 2060000000 HC ICU INTERMEDIATE R&B

## 2018-08-08 PROCEDURE — 86900 BLOOD TYPING SEROLOGIC ABO: CPT

## 2018-08-08 PROCEDURE — 6370000000 HC RX 637 (ALT 250 FOR IP): Performed by: NURSE PRACTITIONER

## 2018-08-08 PROCEDURE — 99232 SBSQ HOSP IP/OBS MODERATE 35: CPT | Performed by: NURSE PRACTITIONER

## 2018-08-08 PROCEDURE — 86850 RBC ANTIBODY SCREEN: CPT

## 2018-08-08 PROCEDURE — 2700000000 HC OXYGEN THERAPY PER DAY

## 2018-08-08 PROCEDURE — 97530 THERAPEUTIC ACTIVITIES: CPT

## 2018-08-08 PROCEDURE — 6360000002 HC RX W HCPCS: Performed by: INTERNAL MEDICINE

## 2018-08-08 PROCEDURE — P9016 RBC LEUKOCYTES REDUCED: HCPCS

## 2018-08-08 PROCEDURE — 2709999900 HC NON-CHARGEABLE SUPPLY

## 2018-08-08 PROCEDURE — L4397 STATIC OR DYNAMI AFO PRE OTS: HCPCS

## 2018-08-08 PROCEDURE — 82948 REAGENT STRIP/BLOOD GLUCOSE: CPT

## 2018-08-08 PROCEDURE — C9113 INJ PANTOPRAZOLE SODIUM, VIA: HCPCS | Performed by: INTERNAL MEDICINE

## 2018-08-08 PROCEDURE — 2580000003 HC RX 258: Performed by: INTERNAL MEDICINE

## 2018-08-08 PROCEDURE — 80048 BASIC METABOLIC PNL TOTAL CA: CPT

## 2018-08-08 PROCEDURE — 99232 SBSQ HOSP IP/OBS MODERATE 35: CPT | Performed by: PHYSICIAN ASSISTANT

## 2018-08-08 PROCEDURE — 85025 COMPLETE CBC W/AUTO DIFF WBC: CPT

## 2018-08-08 PROCEDURE — 97535 SELF CARE MNGMENT TRAINING: CPT

## 2018-08-08 PROCEDURE — 36415 COLL VENOUS BLD VENIPUNCTURE: CPT

## 2018-08-08 PROCEDURE — 6370000000 HC RX 637 (ALT 250 FOR IP): Performed by: PHYSICIAN ASSISTANT

## 2018-08-08 PROCEDURE — 99024 POSTOP FOLLOW-UP VISIT: CPT | Performed by: NEUROLOGICAL SURGERY

## 2018-08-08 PROCEDURE — 36430 TRANSFUSION BLD/BLD COMPNT: CPT

## 2018-08-08 PROCEDURE — 86923 COMPATIBILITY TEST ELECTRIC: CPT

## 2018-08-08 PROCEDURE — 6370000000 HC RX 637 (ALT 250 FOR IP): Performed by: PHYSICAL MEDICINE & REHABILITATION

## 2018-08-08 RX ORDER — SENNA PLUS 8.6 MG/1
2 TABLET ORAL NIGHTLY
Status: DISCONTINUED | OUTPATIENT
Start: 2018-08-08 | End: 2018-08-18 | Stop reason: ALTCHOICE

## 2018-08-08 RX ORDER — DEXAMETHASONE SODIUM PHOSPHATE 4 MG/ML
3 INJECTION, SOLUTION INTRA-ARTICULAR; INTRALESIONAL; INTRAMUSCULAR; INTRAVENOUS; SOFT TISSUE EVERY 12 HOURS
Status: DISCONTINUED | OUTPATIENT
Start: 2018-08-08 | End: 2018-08-11

## 2018-08-08 RX ORDER — DOCUSATE SODIUM 100 MG/1
100 CAPSULE, LIQUID FILLED ORAL 2 TIMES DAILY
Status: DISCONTINUED | OUTPATIENT
Start: 2018-08-08 | End: 2018-08-18 | Stop reason: ALTCHOICE

## 2018-08-08 RX ORDER — 0.9 % SODIUM CHLORIDE 0.9 %
250 INTRAVENOUS SOLUTION INTRAVENOUS ONCE
Status: COMPLETED | OUTPATIENT
Start: 2018-08-08 | End: 2018-08-08

## 2018-08-08 RX ADMIN — HYDROCODONE BITARTRATE AND ACETAMINOPHEN 1 TABLET: 10; 325 TABLET ORAL at 11:00

## 2018-08-08 RX ADMIN — INSULIN GLARGINE 30 UNITS: 100 INJECTION, SOLUTION SUBCUTANEOUS at 09:18

## 2018-08-08 RX ADMIN — IPRATROPIUM BROMIDE AND ALBUTEROL SULFATE 1 AMPULE: .5; 3 SOLUTION RESPIRATORY (INHALATION) at 22:17

## 2018-08-08 RX ADMIN — BICALUTAMIDE 50 MG: 50 TABLET, FILM COATED ORAL at 09:18

## 2018-08-08 RX ADMIN — HYDROCODONE BITARTRATE AND ACETAMINOPHEN 1 TABLET: 10; 325 TABLET ORAL at 00:24

## 2018-08-08 RX ADMIN — HYDROCODONE BITARTRATE AND ACETAMINOPHEN 1 TABLET: 10; 325 TABLET ORAL at 21:52

## 2018-08-08 RX ADMIN — NYSTATIN 500000 UNITS: 100000 SUSPENSION ORAL at 14:05

## 2018-08-08 RX ADMIN — DEXAMETHASONE SODIUM PHOSPHATE 3 MG: 4 INJECTION, SOLUTION INTRA-ARTICULAR; INTRALESIONAL; INTRAMUSCULAR; INTRAVENOUS; SOFT TISSUE at 17:38

## 2018-08-08 RX ADMIN — SENNOSIDES 17.2 MG: 8.6 TABLET, FILM COATED ORAL at 21:52

## 2018-08-08 RX ADMIN — DULOXETINE HYDROCHLORIDE 60 MG: 60 CAPSULE, DELAYED RELEASE ORAL at 09:08

## 2018-08-08 RX ADMIN — NYSTATIN 500000 UNITS: 100000 SUSPENSION ORAL at 17:43

## 2018-08-08 RX ADMIN — METHYLNALTREXONE BROMIDE 20 MG: 12 INJECTION, SOLUTION SUBCUTANEOUS at 14:08

## 2018-08-08 RX ADMIN — BACLOFEN 10 MG: 10 TABLET ORAL at 14:04

## 2018-08-08 RX ADMIN — DOCUSATE SODIUM 100 MG: 100 CAPSULE, LIQUID FILLED ORAL at 21:35

## 2018-08-08 RX ADMIN — FUROSEMIDE 10 MG: 10 INJECTION, SOLUTION INTRAMUSCULAR; INTRAVENOUS at 09:20

## 2018-08-08 RX ADMIN — DOCUSATE SODIUM 100 MG: 100 CAPSULE, LIQUID FILLED ORAL at 09:08

## 2018-08-08 RX ADMIN — Medication 1 UNITS: at 17:45

## 2018-08-08 RX ADMIN — LEVOTHYROXINE SODIUM 50 MCG: 50 TABLET ORAL at 06:59

## 2018-08-08 RX ADMIN — SODIUM CHLORIDE 250 ML: 9 INJECTION, SOLUTION INTRAVENOUS at 07:00

## 2018-08-08 RX ADMIN — FUROSEMIDE 10 MG: 10 INJECTION, SOLUTION INTRAMUSCULAR; INTRAVENOUS at 17:38

## 2018-08-08 RX ADMIN — Medication 1 UNITS: at 09:16

## 2018-08-08 RX ADMIN — BACLOFEN 10 MG: 10 TABLET ORAL at 09:08

## 2018-08-08 RX ADMIN — INSULIN LISPRO 1 UNITS: 100 INJECTION, SOLUTION INTRAVENOUS; SUBCUTANEOUS at 21:35

## 2018-08-08 RX ADMIN — FUROSEMIDE 10 MG: 10 INJECTION, SOLUTION INTRAMUSCULAR; INTRAVENOUS at 01:15

## 2018-08-08 RX ADMIN — Medication 1 UNITS: at 14:06

## 2018-08-08 RX ADMIN — GABAPENTIN 1200 MG: 600 TABLET, FILM COATED ORAL at 21:35

## 2018-08-08 RX ADMIN — BACLOFEN 10 MG: 10 TABLET ORAL at 21:35

## 2018-08-08 RX ADMIN — NYSTATIN 500000 UNITS: 100000 SUSPENSION ORAL at 21:35

## 2018-08-08 RX ADMIN — IPRATROPIUM BROMIDE AND ALBUTEROL SULFATE 1 AMPULE: .5; 3 SOLUTION RESPIRATORY (INHALATION) at 16:24

## 2018-08-08 RX ADMIN — IPRATROPIUM BROMIDE AND ALBUTEROL SULFATE 1 AMPULE: .5; 3 SOLUTION RESPIRATORY (INHALATION) at 10:06

## 2018-08-08 RX ADMIN — BISACODYL 10 MG: 10 SUPPOSITORY RECTAL at 09:08

## 2018-08-08 RX ADMIN — NYSTATIN 500000 UNITS: 100000 SUSPENSION ORAL at 09:11

## 2018-08-08 RX ADMIN — IPRATROPIUM BROMIDE AND ALBUTEROL SULFATE 1 AMPULE: .5; 3 SOLUTION RESPIRATORY (INHALATION) at 12:39

## 2018-08-08 RX ADMIN — DEXAMETHASONE SODIUM PHOSPHATE 3 MG: 4 INJECTION, SOLUTION INTRA-ARTICULAR; INTRALESIONAL; INTRAMUSCULAR; INTRAVENOUS; SOFT TISSUE at 05:28

## 2018-08-08 RX ADMIN — POLYETHYLENE GLYCOL 3350 17 G: 17 POWDER, FOR SOLUTION ORAL at 09:09

## 2018-08-08 RX ADMIN — PANTOPRAZOLE SODIUM 40 MG: 40 INJECTION, POWDER, FOR SOLUTION INTRAVENOUS at 09:12

## 2018-08-08 RX ADMIN — GABAPENTIN 1200 MG: 600 TABLET, FILM COATED ORAL at 09:08

## 2018-08-08 RX ADMIN — GABAPENTIN 1800 MG: 600 TABLET, FILM COATED ORAL at 14:04

## 2018-08-08 RX ADMIN — OXYCODONE HYDROCHLORIDE AND ACETAMINOPHEN 1 TABLET: 5; 325 TABLET ORAL at 16:23

## 2018-08-08 RX ADMIN — OXYCODONE HYDROCHLORIDE AND ACETAMINOPHEN 1 TABLET: 5; 325 TABLET ORAL at 09:09

## 2018-08-08 ASSESSMENT — PAIN SCALES - GENERAL
PAINLEVEL_OUTOF10: 7
PAINLEVEL_OUTOF10: 4
PAINLEVEL_OUTOF10: 0
PAINLEVEL_OUTOF10: 5
PAINLEVEL_OUTOF10: 0
PAINLEVEL_OUTOF10: 3
PAINLEVEL_OUTOF10: 8
PAINLEVEL_OUTOF10: 2
PAINLEVEL_OUTOF10: 4
PAINLEVEL_OUTOF10: 3
PAINLEVEL_OUTOF10: 6
PAINLEVEL_OUTOF10: 3
PAINLEVEL_OUTOF10: 4
PAINLEVEL_OUTOF10: 5
PAINLEVEL_OUTOF10: 2
PAINLEVEL_OUTOF10: 0

## 2018-08-08 ASSESSMENT — PAIN DESCRIPTION - PAIN TYPE: TYPE: SURGICAL PAIN

## 2018-08-08 ASSESSMENT — PAIN DESCRIPTION - ORIENTATION: ORIENTATION: MID

## 2018-08-08 ASSESSMENT — PAIN DESCRIPTION - LOCATION: LOCATION: BACK

## 2018-08-08 ASSESSMENT — PAIN SCALES - WONG BAKER: WONGBAKER_NUMERICALRESPONSE: 2

## 2018-08-08 ASSESSMENT — PAIN DESCRIPTION - ONSET: ONSET: ON-GOING

## 2018-08-08 ASSESSMENT — PAIN DESCRIPTION - FREQUENCY: FREQUENCY: CONTINUOUS

## 2018-08-08 ASSESSMENT — PAIN DESCRIPTION - DESCRIPTORS: DESCRIPTORS: ACHING

## 2018-08-08 NOTE — PROGRESS NOTES
1244 Patient finished the 1 unit of PRBC's. Dr. Glenn Solano on floor and updated her of this. Vitals were taken and lung sounds diminished.

## 2018-08-08 NOTE — PROGRESS NOTES
BUN  25*  25*  22   CREATININE  0.5  0.6  0.6   GLUCOSE  164*  193*  169*     Hepatic: Recent Labs      08/06/18   0318  08/07/18   0420   AST  17  17   ALT  11  15   BILITOT  0.4  0.4   ALKPHOS  139*  126     INR: No results for input(s): INR in the last 72 hours. Xray:   PROCEDURE: XR ABDOMEN (KUB) (SINGLE AP VIEW)  8/8/2018   CLINICAL INFORMATION: Ileus. Recent back surgery. . Abdominal distention.   COMPARISON: 8/7/2018   TECHNIQUE: 3 supine images of the abdomen were obtained.     FINDINGS:   There is moderate gaseous distention of the colon and moderate gaseous distention of a few centrally located small bowel loops, consistent with moderate postoperative ileus. The overall appearance is slightly worsened since yesterday. . Kidneys are somewhat obscured. No definite renal or ureteral calculi are seen. There are a few phleboliths in the pelvis. Vascular clips are present in the pelvis from prior surgery. Metallic rods are present in the lower thoracic spine. Mild left basilar atelectasis/pneumonia.      Impression  1. Mild left basilar atelectasis/pneumonia. 2. Moderate postoperative ileus, slightly worse than yesterday.      **This report has been created using voice recognition software.  It may contain minor errors which are inherent in voice recognition technology. **     Final report electronically signed by Dr. Modesta Valencia on 8/8/2018 1:11 PM    PROCEDURE: XR ABDOMEN (KUB) (SINGLE AP VIEW)  8/7/2018   CLINICAL INFORMATION: Constipation.   COMPARISON: No prior study.   TECHNIQUE: 3 AP supine views of the abdomen performed.      FINDINGS:   POSTOPERATIVE CHANGES:   1. Partially imaged posterior spinal fixation hardware lower thoracic spine.   2. Disc space cages at L3-4, L4-5 and L5-S1.  3. Surgical clips overlie the inferior aspect of the mid pelvis and the medial aspect of the proximal right thigh.   LINES/TUBES/MECHANICAL DEVICES: None.   BOWEL GAS PATTERN:  1.  There is mild gaseous distention of the colon and gas within several nondistended loops of small bowel which in the right clinical setting could represent an ileus. There is a small amount stool within the ascending colon.   LUNG BASES:   1. Not included within the field of imaging.   FREE AIR: None.   MASS SHADOWS: None.   PATHOLOGIC CALCIFICATIONS: None.   OSSEOUS STRUCTURES:   1. Please refer to the CT abdomen/pelvis examination report dated 8/1/2018 for findings related to the spine.   OTHER: None.      Impression  1. Marium Lindo is mild gaseous distention of the colon and gas within several nondistended loops of small bowel which in the right clinical setting could represent an ileus. There is a small amount stool within the ascending colon.      **This report has been created using voice recognition software.  It may contain minor errors which are inherent in voice recognition technology. **     Final report electronically signed by Dr. Susan Wagner on 8/7/2018 9:29 AM     PROCEDURE: CT ABDOMEN PELVIS W WO CONTRAST  8/1/2018   CLINICAL INFORMATION: acute cord compression r/o mets .   COMPARISON: None.   TECHNIQUE: 5 mm axial CT images were obtained through the abdomen and pelvis before and after the administration of intravenous and oral contrast. Coronal and sagittal reconstructions were obtained.   All CT scans at this facility use dose modulation, iterative reconstruction, and/or weight-based dosing when appropriate to reduce radiation dose to as low as reasonably achievable.     FINDINGS:  Marium Lindo is basilar fibrolinear scarring left greater than right. The noncontrast appearance of the cardiac chambers, gallbladder, pancreas and spleen are negative for active pathology with mild fatty replacement of the liver as well as pancreas noted. There is no biliary obstruction. Left and right kidney demonstrate nonobstructing punctate calculi.  There is trace contrast retained from previous images, correlate with mild perinephric stranding associated with

## 2018-08-08 NOTE — PROGRESS NOTES
Covering Dr. Idalmis Manzanares day 5    S/P  bilateral pedicle screws in T5, T6, T7, T10 and T11 + posterior decompression at T8-T9    Dx: metastatic prostate cancer + spinal compression and paraplegia     Still having Issue of abdomenal camping and distention otherwise no  acute events overnight. Improvement in his sensation. Vitals stable today   A/A/Ox3  FC x4 with good strength in his upper extremities, in lower extremities ( strength around 1/5 grossly in the right leg and about 1/5 the left, less than yesterday). Better sensation in right leg today ( he feels below the knee level in that leg). Not able to feel my touch below the knee  level ( in the left). Has babinski's sign in bothsides    Dressing: D/C/I      A/P:    Post Op day 5    S/P  bilateral pedicle screws in T5, T6, T7, T10 and T11 + posterior decompression at T8-T9    Dx: metastatic prostate cancer + spinal compression and paraplegia    -  improvement in his sensation in the right leg.    -  Decrease the steroids to 4  mg q  12. -  Strict blood sugar control.   -  PT and OT   - Up to the chair.  - OK for Levonex for DVT prophylaxis. - aggressive bowel protocol.  - follow up the GI recommendation.  -Rehab consultation.

## 2018-08-08 NOTE — PROGRESS NOTES
N/O  Times per day: Daily  Specific instructions for Next Treatment: therex, bed mobility, sitting balance, PT to assess transfers  Current Treatment Recommendations: Strengthening, ROM, Functional Mobility Training, Balance Training, Endurance Training, Home Exercise Program, Safety Education & Training, Patient/Caregiver Education & Training, Pain Management, Equipment Evaluation, Education, & procurement, Neuromuscular Re-education    Goals:  Patient goals : less pain    Short term goals  Time Frame for Short term goals: 2 weeks  Short term goal 1: bed mobility with modAx2 to get in/out of bed  Short term goal 2: tolerate >10 min dyn sitting balance with 0-1 UE support and </=minAx1 to demonstrate inc trunk control for progression to transfers  Short term goal 3: PT to assess transfers    Long term goals  Time Frame for Long term goals : no LTGs set secondary to short ELOS

## 2018-08-08 NOTE — PROGRESS NOTES
IM Progress Note  Dr. Omar Vargas  8/8/2018 1:26 PM      Patient name Ruma See  [id number]  PCP: Elvie Rios MD  Admit Date: 8/1/2018  Acct No. [de-identified]    Subjective: Interval History:   Had BM    Diet: DIET CLEAR LIQUID;    I/O last 3 completed shifts: In: 740 [P.O.:740]  Out: 3025 [EWVAM:3157]  I/O this shift:  In: 310 [Blood:310]  Out: 900 [Urine:900]        Admission weight: 279 lb (126.6 kg) as of 8/1/2018  5:04 PM  Wt Readings from Last 3 Encounters:   08/07/18 299 lb 1 oz (135.7 kg)     Body mass index is 41.71 kg/m².           Medications:   Scheduled Meds:   sodium chloride  250 mL Intravenous Once    dexamethasone  3 mg Intravenous Q12H    furosemide  10 mg Intravenous Q8H    gabapentin  1,800 mg Oral Daily    gabapentin  1,200 mg Oral BID    bisacodyl  10 mg Rectal Daily    baclofen  10 mg Oral TID    senna  1 tablet Oral Nightly    docusate sodium  100 mg Oral Daily    polyethylene glycol  17 g Oral Daily    insulin glargine  30 Units Subcutaneous Daily    methylnaltrexone  0.15 mg/kg Subcutaneous Q48H    insulin lispro  0-6 Units Subcutaneous TID WC    insulin lispro  0-3 Units Subcutaneous Nightly    ipratropium-albuterol  1 ampule Inhalation Q4H WA    calcium replacement protocol   Other RX Placeholder    potassium (CARDIAC) replacement protocol   Other RX Placeholder    magnesium replacement protocol   Other RX Placeholder    DULoxetine  60 mg Oral Daily    nystatin  5 mL Oral 4x Daily    bicalutamide  50 mg Oral Daily    Fluticasone Furoate-Vilanterol  1 applicator Inhalation Daily    levothyroxine  50 mcg Oral Daily     Continuous Infusions:   dextrose         Labs :     CBC:   Recent Labs      08/06/18 0318 08/07/18   0420  08/08/18   0535   WBC  15.9*  12.7*  13.0*   HGB  7.6*  7.1*  6.9*   PLT  172  175  200     BMP:    Recent Labs      08/06/18 0318 08/07/18   0420  08/08/18   0535   NA  144  139  138   K  3.4*  4.4  3.6   CL  106  103  100   CO2 25  27  28   BUN  25*  25*  22   CREATININE  0.5  0.6  0.6   GLUCOSE  164*  193*  169*     Hepatic:   Recent Labs      08/06/18   0318  08/07/18   0420   AST  17  17   ALT  11  15   BILITOT  0.4  0.4   ALKPHOS  139*  126     Troponin: No results for input(s): TROPONINI in the last 72 hours. BNP: No results for input(s): BNP in the last 72 hours. Lipids:   No results for input(s): CHOL, HDL in the last 72 hours. Invalid input(s): LDLCALCU  INR:   No results for input(s): INR in the last 72 hours.     Radiology    Objective:   Vitals: BP (!) 117/56   Pulse 82   Temp 98.8 °F (37.1 °C) (Axillary)   Resp 18   Ht 5' 11\" (1.803 m)   Wt 299 lb 1 oz (135.7 kg)   SpO2 98% Comment: lung sounds diminshed bilaterally  BMI 41.71 kg/m²   HEENT: Head:pupils react  Neck: supple thick  Lungs: air exchange appreciated bilat  Heart: regular  rhythm tachycardic  Abdomen: distended but  BS are appreciated  bruise on both sides of abd  Extremities: warm +  Edema all 4 ext  Neurologic:  Awake oriented paraplegic touch sensation appreciated above both knees    Impression:   :   S/p spinal surgery for metastatic prostate cancer to the spine with acute cord compression and paraplegia  With rolonged effect of anesthesia  Acute Resp failure extubated now  Anemia s/p PRBC 1 unit  Leucocytosis  Fluid retention  IRDM  Rhabdo  improved  Hypothyroidism  ED  COPD  Hypotension off pressors      Plan:    F/u on cbc tomorrow  Cont lasix same dose   PT OT  Bowel program to continue PMR consulted     Regina Florence MD

## 2018-08-08 NOTE — PROGRESS NOTES
[facility]    Patient Dale Morales   MRN -  [id number]   [facility] # - [de-identified]   - 1955      Date of Admission -  2018  5:04 PM  Date of evaluation -  2018  Room - 4A-/[facility]   Hospital Day - MD [place] Primary Care Physician - Elvie Rios MD       Reason for Consult    Prostate Cancer   Acute cord compression   [facility]    Diagnosis Date Noted    Respiratory failure requiring intubation ([facility] [address] [J96.90]     Acute myelopathy ([facility] [address] [G95.9]     Prostate cancer, primary, with metastasis from prostate to other site [facility]) Franko Ode     Cord compression [facility]) [G95.20] 2018     HPI/Subjective   Ruma See is a 61 y.o. male admitted for severe back pain, acute cord compression. The patient has a history of prostate cancer with prostatectomy completed in  by urology in [place], [place]. He developed back pain several months ago that had been followed and work-up completed by his PCP revealed spinal metastasis. He was referred to Radiation Oncology. The patient was sent to the ED  on 18 from Radiation Oncology, Dr. Faith Clark, for concern for new leg weakness, severe back pain. He had MRI of the brain, cervical, thoracic and lumbar spine completed in the ED. He had findings of metastasis of the C7 vertebra, multiple thoracic vertebra and lumbar vertebra metastasis. He had diffuse replacement by metastasis of T8 with associated pathologic fracture with abnormal soft tissue density causing mass effect upon the thoracic spinal cord. The patient was admitted for further evaluation. Neurosurgery was consulted and the patient was started on high dose steroids. Oncology consult was requested to follow up on above.   The patient underwent placement of pedicle screws T5-6-7 and T10-11, removal of posterior elements T8, T9, removal of epidural metastatic tumor posterolaterally T8-9, Patient assessed for the following post venofer infusion:    Dizziness   No  Lightheadedness  No     Acute nausea/vomiting No  Headache              No  Chest pain/pressure  No  Rash/itching   No  Shortness of breath  No    Patient kept for 20 minutes observation post venofer infusion. Patient tolerated venofer infusion without any complications. Last vital signs:   BP (!) 118/56   Pulse 64   Temp 98.5 °F (36.9 °C) (Oral)   Resp 16   SpO2 95%     Patient instructed if experience any of the above symptoms following today's infusion,he/she is to notify MD immediately or go to the emergency department. Discharge instructions given to patient. Verbalizes understanding. Ambulated off unit per self with daughter with belongings. transpedicular removal of T8 vertebral body involvement by tumor, bilateral sectioning of segmental nerve roots at T8, spine navigation, use of operating microscopy, interpretation of intraoperative CT using body TIARA by Dr. Zeynep Johnson on 8/3/18. Over the last 24 hours: Patient is in bed reading newspaper, his wife is at the bedside. The patient reports currently abdominal pain, nausea have improved. He passed flatus this am. He reports buttock and right leg pain. He denies fever, chills, shortness of breath, chest pain, urinary changes - domínguez is in place. He denies abnormal bleeding, no epistaxis, hemoptysis, hematemesis, hematuria. No recent bowel movements, no indigestion.      Meds    Current Medications    sodium chloride  250 mL Intravenous Once    dexamethasone  3 mg Intravenous Q12H    furosemide  10 mg Intravenous Q8H    pantoprazole  40 mg Intravenous Daily    gabapentin  1,800 mg Oral Daily    gabapentin  1,200 mg Oral BID    bisacodyl  10 mg Rectal Daily    baclofen  10 mg Oral TID    senna  1 tablet Oral Nightly    docusate sodium  100 mg Oral Daily    polyethylene glycol  17 g Oral Daily    insulin glargine  30 Units Subcutaneous Daily    methylnaltrexone  0.15 mg/kg Subcutaneous Q48H    insulin lispro  0-6 Units Subcutaneous TID WC    insulin lispro  0-3 Units Subcutaneous Nightly    ipratropium-albuterol  1 ampule Inhalation Q4H WA    calcium replacement protocol   Other RX Placeholder    potassium (CARDIAC) replacement protocol   Other RX Placeholder    magnesium replacement protocol   Other RX Placeholder    DULoxetine  60 mg Oral Daily    nystatin  5 mL Oral 4x Daily    bicalutamide  50 mg Oral Daily    Fluticasone Furoate-Vilanterol  1 applicator Inhalation Daily    levothyroxine  50 mcg Oral Daily     oxyCODONE-acetaminophen, insulin regular, dextrose, fentanNYL, ondansetron, glucose, dextrose, glucagon (rDNA), dextrose, HYDROcodone-acetaminophen  IV Drips/Infusions   pressure is 126/58 (abnormal) and his pulse is 92. His respiration is 18 and oxygen saturation is 99%. O2 Flow Rate (L/min): 3 L/min    Exam   Physical Exam   General appearance: No apparent distress, chronically ill appearing, well developed, and cooperative. HEENT: Pupils equal, round, and reactive to light. Conjunctivae/corneas clear. Oral mucosa moist.   Neck: Supple, with full range of motion. Trachea midline. Respiratory:  Normal respiratory effort. Clear to auscultation bilaterally, on room air. Cardiovascular: Regular rate and rhythm with normal S1/S2   Abdomen: distended abdomen, bowel sounds in all four quadrants  Musculoskeletal: active range of motion of upper extremities, sensation in right extremity above knee. No movement or sensation in LLE, right LE below knee. SCDs in place  Skin: Skin color, texture, turgor normal.    Neurologic: active range of motion to upper extremities, no sensation or movement of lower extremities. Alert and oriented. Appropriate with conversation.    Capillary Refill: Brisk,< 3 seconds   Peripheral Pulses: +2 palpable, equal bilaterally radially and dorsalis pedis       Labs   CBC  Recent Labs      08/06/18 0318 08/07/18   0420  08/08/18   0535   WBC  15.9*  12.7*  13.0*   RBC  2.47*  2.32*  2.23*   HGB  7.6*  7.1*  6.9*   HCT  22.6*  22.1*  21.2*   MCV  91.5  95.3*  95.1*   MCH  30.8  30.6  30.9   MCHC  33.6  32.1*  32.5   PLT  172  175  200   MPV  10.6  11.1  10.9      BMP  Recent Labs      08/06/18 0318 08/07/18   0420  08/08/18   0535   NA  144  139  138   K  3.4*  4.4  3.6   CL  106  103  100   CO2  25  27  28   BUN  25*  25*  22   CREATININE  0.5  0.6  0.6   GLUCOSE  164*  193*  169*   CALCIUM  8.1*  8.0*  7.1*     LFT  Recent Labs      08/06/18 0318 08/07/18   0420   AST  17  17   ALT  11  15   BILITOT  0.4  0.4   ALKPHOS  139*  126     Radiology        Ct Abdomen Pelvis W Wo Contrast Additional Contrast? Radiologist Recommendation    Result Date: 8/1/2018  PROCEDURE: CT ABDOMEN PELVIS W WO CONTRAST CLINICAL INFORMATION: acute cord compression r/o mets . COMPARISON: None. TECHNIQUE: 5 mm axial CT images were obtained through the abdomen and pelvis before and after the administration of intravenous and oral contrast. Coronal and sagittal reconstructions were obtained. All CT scans at this facility use dose modulation, iterative reconstruction, and/or weight-based dosing when appropriate to reduce radiation dose to as low as reasonably achievable. FINDINGS:  There is basilar fibrolinear scarring left greater than right. The noncontrast appearance of the cardiac chambers, gallbladder, pancreas and spleen are negative for active pathology with mild fatty replacement of the liver as well as pancreas noted. There is no biliary obstruction. Left and right kidney demonstrate nonobstructing punctate calculi. There is trace contrast retained from previous images, correlate with mild perinephric stranding associated with nephritis. The small bowel and colon are negative for gross obstruction or inflammatory wall changes. A few scattered colonic diverticula changes are present. The ventral abdominal wall image 50 postsurgical mesh from hernia repair near the umbilicus. There is contrast within the urinary bladder. Postsurgical instrumentation of the lumbar sacral spine are present with disc graft fusion at L3, L4-5, and L5-S1. The T8 sclerotic lesion with compression deformities are again noted. Metastatic changes again are suspected. Additional sclerotic foci within several vertebral of the lower thoracic and lumbar spine persists considered with metastatic change to the skeleton. 1. T8 vertebral pathologic appearing fracture with sclerosis suggesting metastatic changes of the skeleton with additional lesions within the lower thoracic and lumbar segments. No obvious high-grade stenosis of the central canal visualized.  Chronic degenerative changes of lumbar spine are visualized. Postsurgical changes of the ventral abdominal wall with visualized periumbilical mesh. Minimal colonic diverticulosis. **This report has been created using voice recognition software. It may contain minor errors which are inherent in voice recognition technology. ** Final report electronically signed by Dr. Temi Bajwa on 8/1/2018 11:41 PM    Ct Chest W Wo Contrast    Result Date: 8/1/2018  PROCEDURE: CT CHEST W WO CONTRAST CLINICAL INFORMATION: prostate cancer r/o mets. COMPARISON: No prior study. TECHNIQUE: 5 mm axial images were obtained through the chest before and after the administration of intravenous contrast. Sagittal and coronal reconstructions were obtained. All CT scans at this facility use dose modulation, iterative reconstruction, and/or weight-based dosing when appropriate to reduce radiation dose to as low as reasonably achievable. FINDINGS: Coarse markings are visualized at the lung bases with fibrolinear scarring the left lower lobe seen. Calcified granuloma changes are present. There is no acute venous congestion or pneumothorax. Degenerative skeletal changes are present with sclerosis of  the mid lower thoracic vertebral bodies near the T8 level. Sclerotic appearance of C7 and portions of T10 and T11 are also noted correlation with metastatic changes the etiologies such as prostate or renal are There is no aneurysm present. The mediastinum is negative for pathologic adenopathy. There is no pneumothorax seen. 1. Minimal fibrolinear scarring lung bases. 2. Vertebral deformity at T8 with pathologic-appearing compression with superimposed sclerosis, very minimal narrowing of the central canal is not excluded. 3. Additional thoracic and lower cervical vertebra with sclerotic changes concerning for metastatic change to the skeleton. **This report has been created using voice recognition software. It may contain minor errors which are inherent in voice recognition technology. ** Final report electronically signed by Dr. Sohan White on 8/1/2018 11:33 PM    Ct Cervical Spine Wo Contrast    Result Date: 8/2/2018  PROCEDURE: CT CERVICAL SPINE WO CONTRAST CLINICAL INFORMATION: METASTASES TO SPINE, prostate cancer r/o mets. Metastases. History of prostate cancer. Worsening back pain. COMPARISON: Cervical spine MRI 8/1/2018. TECHNIQUE: 3 mm noncontrast axial images were obtained through the cervical spine with sagittal and coronal reconstructions. All CT scans at this facility use dose modulation, iterative reconstruction, and/or weight-based dosing when appropriate to reduce radiation dose to as low as reasonably achievable. FINDINGS: There is a sclerotic metastasis in the inferior left aspect of C7. No other metastases are noted in the cervical spine. The patient has anterior hardware in the C3-4 level. There is a solid fusion across the discs at C4-C7. The cervical vertebral bodies are normally aligned. There is no fracture. There is no prevertebral soft tissue swelling. There are anterior osteophytes at the C2-3 and C3-4 levels. There is a large anterior osteophyte on the right at the C7-T1 level. There are no suspicious findings in the cervical soft tissues. There are no suspicious findings in the lung apices. 1. Sclerotic metastasis in the inferior anterior aspect of C7. 2. Solid osseous fusion of C4-C7. **This report has been created using voice recognition software. It may contain minor errors which are inherent in voice recognition technology. ** Final report electronically signed by Dr. Sarwat Clemente on 8/2/2018 9:58 AM    Ct Thoracic Spine Wo Contrast    Result Date: 8/3/2018  PROCEDURE: CT THORACIC SPINE WO CONTRAST CLINICAL INFORMATION: Thoracic fusion with tumor removal.. COMPARISON: Thoracic spine CT 8/2/2018. Thoracic spine MRI 8/1/2018. TECHNIQUE: 1.25 mm axial images were obtained through the thoracic spine intraoperatively. The patient is prone.  Sagittal and coronal There is known extent in the posterior elements and spinal canal. This is better demonstrated on MR. 2. Additional sclerotic metastases are also present in T7 and T9. Additional metastases are present in the lower thoracic and lumbar lumbar bodies. The metastases are better seen on MRI. These findings were discussed with Dr. Norris Mcdonnell at 10:45 AM on 8/2/2018. **This report has been created using voice recognition software. It may contain minor errors which are inherent in voice recognition technology. ** Final report electronically signed by Dr. Birdie Bey on 8/2/2018 11:07 AM    Ct Lumbar Spine Wo Contrast    Result Date: 8/2/2018  PROCEDURE: CT LUMBAR SPINE WO CONTRAST CLINICAL INFORMATION: METASTASES, prostate cancer r/o mets, prostate cancer r/o mets. Worsening back pain. Rule out metastases. COMPARISON: Lumbar spine MRI 8/1/2018 and 5/8/2018. TECHNIQUE: 3 mm noncontrast axial images were obtained of the lumbar spine. Sagittal and coronal reconstructions were obtained. Angled images were reconstructed through the L3-4, L4-5 and L5-S1 disc levels. All CT scans at this facility use dose modulation, iterative reconstruction, and/or weight-based dosing when appropriate to reduce radiation dose to as low as reasonably achievable. FINDINGS: The patient has dense disc fusion material in the disks at the L3-4, L4-5 and L5-S1 levels. There are no pedicle screws. There is a solid fusion of the posterior elements of L3-S1. There is abnormal sclerosis within the L4 and L5 vertebral bodies. The lesion is largest in L4. This is present in the left aspect of the vertebral body. There are 2 subtle focal sclerotic lesions in L2. All of these are consistent with metastases. These  have worsened compared to the MRI of May 2018. There are also small metastases seen in T11 and T12. Those are better seen on MRI. There are no compression fractures. No pars defects are noted.  There are no gross abnormalities within the spinal canal. On the axial images, there are bilateral nonobstructing renal calculi. There is no paraspinal abnormality. No foraminal stenosis is noted. There is no definite spinal canal stenosis. Sclerotic metastases are present in T11, T12, L2, L4 and L5 vertebral bodies. No pathologic fractures are noted. **This report has been created using voice recognition software. It may contain minor errors which are inherent in voice recognition technology. ** Final report electronically signed by Dr. Lenny Ovalle on 8/2/2018 10:09 AM    Mri Cervical Spine W Jessica Jimenez Contrast    Addendum Date: 8/2/2018    ** ADDENDUM #1 ** Addendum:. Upon review of images, there is abnormal signal in the inferior left aspect of the C7 vertebral body. There is low signal on the T1 and T2-weighted images. This is consistent with a focal metastasis related to the patient's prostate cancer. The patient has a solid osseous fusion of C4-C7. On the axial images, at C2-3, there are bilateral facet degenerative changes. There is no spinal canal stenosis. There is mild left foraminal stenosis. At C3-4, there is a moderate-sized posterior disc osteophyte complex. There is moderate severity spinal canal and bilateral foraminal stenosis. At C4-C7, there has been fusion. There is no spinal canal stenosis. There is mild left-sided foraminal stenosis at the C5-6 level related to residual osteophytic ridging. At C7-T1, there is no spinal canal or foraminal stenosis. Final report electronically signed by Dr. Lenny Ovalle on 8/2/2018 10:02 AM ** ORIGINAL REPORT ** PROCEDURE: MRI CERVICAL SPINE W WO CONTRAST CLINICAL INFORMATION: prostate cancer vertebral metastases . COMPARISON: No prior study. TECHNIQUE: Sagittal T1, T2 and STIR sequences were obtained through the cervical spine. Axial fast and echo and gradient echo T2-weighted images were obtained. Postcontrast axial and sagittal T1-weighted images were obtained.  FINDINGS: The patient has had prior anterior interbody fusion from the levels of C4-C6. The cervical medullary junction appears intact. There is no acute compression deformities. The regional soft tissues are grossly normal. Axial disc spaces: C2-3: The disc space, central canal and foramen are grossly normal. C3-4: There is postsurgical changes of the C4 level. The disc space shows a 3 mm posterior disc bulge without acute central canal impingement. Foraminal stenosis is moderate severe bilaterally. C4-5, C5-6, C6-7: The disc spaces are fused limiting detail. The central canal remains patent to the cervical segment of surgery. At C4-5 there is bilateral right foramen narrowing. C5-6 mild bilateral foramen narrowing present. C6-7: The foramen are patent. C7-T1: The disc space, central canal and foramen are grossly normal. There is a dominant left vertebral arteries system. Result Date: 8/2/2018  PROCEDURE: MRI CERVICAL SPINE W WO CONTRAST CLINICAL INFORMATION: prostate cancer vertebral metastases . COMPARISON: No prior study. TECHNIQUE: Sagittal T1, T2 and STIR sequences were obtained through the cervical spine. Axial fast and echo and gradient echo T2-weighted images were obtained. Postcontrast axial and sagittal T1-weighted images were obtained. FINDINGS: The patient has had prior anterior interbody fusion from the levels of C4-C6. The cervical medullary junction appears intact. There is no acute compression deformities. The regional soft tissues are grossly normal. Axial disc spaces: C2-3: The disc space, central canal and foramen are grossly normal. C3-4: There is postsurgical changes of the C4 level. The disc space shows a 3 mm posterior disc bulge without acute central canal impingement. Foraminal stenosis is moderate severe bilaterally. C4-5, C5-6, C6-7: The disc spaces are fused limiting detail. The central canal remains patent to the cervical segment of surgery. At C4-5 there is bilateral right foramen narrowing.  C5-6 mild bilateral foramen narrowing present. C6-7: The foramen are patent. C7-T1: The disc space, central canal and foramen are grossly normal. There is a dominant left vertebral arteries system. 1. Postsurgical changes of the mid lower cervical spine with anterior interbody fusion. 2. Multilevel degenerative changes described above at individual levels. **This report has been created using voice recognition software. It may contain minor errors which are inherent in voice recognition technology. ** Final report electronically signed by Dr. Temi Bajwa on 8/2/2018 1:09 AM    Mri Thoracic Spine W Natasha Mer Contrast    Addendum Date: 8/2/2018    ** ADDENDUM #1 ** PROCEDURE: MRI THORACIC SPINE W WO CONTRAST CLINICAL INFORMATION: prostate cancer vertebral metastases. Dull headache but is been constant for 2 weeks. Mid and lower back pain. Recently diagnosed with spinal metastases. Bilateral leg weakness. COMPARISON: Thoracic spine MRI 7/26/2018. TECHNIQUE: Sagittal T1, T2 and STIR sequences were obtained through the thoracic spine. Axial T2-weighted images were obtained through the discs. FINDINGS: There is diffuse abnormal signal throughout the T8 vertebral body. This is consistent with diffuse replacement by a metastasis. There is an associated pathologic fracture through the T8 vertebral body. Fracture lines are visible. There is associated soft  tissue mass/tumor extending into the prevertebral paraspinal soft tissues. There is also abnormal soft tissue density/tumor extending into the spinal canal and surrounding the thecal sac and thoracic spinal cord. On the postcontrast images, tumor is seen within the spinal canal. This surrounds and exhibits circumferential mass effect upon the thoracic spinal cord. Tumor extends into the neural foramen on the right at the T7-8 and T8-9 levels. Multiple additional metastases are present. There is near complete replacement of the T7 vertebral body.  There is also tumor within the superior aspect of the T9 It may contain minor errors which are inherent in voice recognition technology. ** Final report electronically signed by Dr. Andrea Chow on 8/2/2018 1:32 AM    Mri Lumbar Spine W Wo Contrast    Addendum Date: 8/2/2018    ** ADDENDUM #1 ** PROCEDURE: MRI LUMBAR SPINE W WO CONTRAST CLINICAL INFORMATION: prostate cancer vertebral metastases. Dull headache. Mid and lower back pain. Recently diagnosed with spinal metastases. Bilateral leg weakness. COMPARISON: Lumbar spine MRI 5/8/2018. TECHNIQUE: Sagittal and axial T1 and T2-weighted images were obtained through the lumbar spine. FINDINGS: There is abnormal marrow signal in the left aspect of the L4 vertebral body. This extends in the left pedicle. There is low signal on the T1 and T2-weighted images. This has increased in size compared to the prior MRI. This is consistent with an enlarging metastasis. Sclerotic metastases are also present in the left aspect of L5. This extends into the left pedicle. There are also small scattered metastases in the sacrum and sacral ala. A metastasis within L2 has increased in size. There is also metastasis in the inferior endplates of R35 and L1. There is some mild bone marrow edema associated with the metastases in T11, T12 and L2. There is faint edema associated with the metastases in L4 and L5. There are no compression fractures. The distal spinal cord, conus medullaris and cauda equina are normal. On the axial images, there is no significant spinal canal stenosis at any level. There is degenerative disc disease. There is mild bilateral foraminal stenosis at the L1-2, L2-3 and L3-4 levels. There are no suspicious findings in the visualized aspects of the retroperitoneum and paraspinal soft tissues. Result Date: 8/2/2018  PROCEDURE: MRI LUMBAR SPINE W WO CONTRAST CLINICAL INFORMATION: prostate cancer vertebral metastases.  COMPARISON: CT abdomen pelvis August 1, 2018 TECHNIQUE: Sagittal and axial T1 and T2-weighted images were obtained to the lumbar spine. Postcontrast axial and sagittal T1-weighted images were also obtained. FINDINGS: There are postsurgical changes of L3-L5 S1 with intervertebral disc grafts present. There are 6 postsurgical soft tissue changes of the posterior paraspinal region correlate with chronicity or acuity of surgical procedure. There is no acute central canal  impingement or vertebral abnormality of the lumbar segment. There is normal tapering of the conus at T12-L1 Marrow signal at L2 as visualized raises concern for lesions related to metastases without pathologic fracture. Changes of T12 and T11 are also considered Disc levels: L1-2: The disc space is narrow. The central canal and foramen remain normal. L2-3: There is desiccation thinning of the disc with minimal posterior disc bulge without acute central canal impingement. The foramen are negative for impingement L3-4: There is an intervertebral disc graft. The central canal is intact. There is mild lateral recess narrowing without significant foraminal impingement L4-5: There is intervertebral disc graft without acute central canal impingement. The foramen are negative for significant stenosis L5-S1: There is an intervertebral disc graft without central canal narrowing. The foramen are patent. 1. Postsurgical instrumentation and changes of L3-S1 with posterior paraspinal soft tissue changes correlate with chronicity or acuity of recent surgery. 2. Skeletal marrow signal changes concerning for metastases at L2 and T12 partially visualized. 3. Degenerative changes of the lumbar spine segments described above at individual levels without acute high-grade impingement of the central canal structures **This report has been created using voice recognition software. It may contain minor errors which are inherent in voice recognition technology. ** Final report electronically signed by Dr. Annel Monson on 8/2/2018 1:41 AM    Nm Comparison Of Outside may contain minor errors which are inherent in voice recognition technology. ** Final report electronically signed by Dr. Lopez Mail on 8/4/2018 10:16 AM    Xr Chest Portable    Result Date: 8/3/2018  PROCEDURE: XR CHEST PORTABLE CLINICAL INFORMATION: ET Tube placement, . COMPARISON: August 3, 2018 TECHNIQUE: AP upright view of the chest. FINDINGS: There is stable postsurgical changes of the mid lower thoracic spine. The heart size remains unchanged. There is redemonstration of COPD and diffuse coarse markings. The endotracheal tube has been repositioned and now measures approximately 2.3 cm above the idalmis. Nasogastric tube enters the stomach. Central venous structures are unchanged     1. Interval adjustment of endotracheal tube which remains above the idalmis. 2. Stable postsurgical changes. **This report has been created using voice recognition software. It may contain minor errors which are inherent in voice recognition technology. ** Final report electronically signed by Dr. Marisela Dolan on 8/3/2018 11:14 PM    Xr Chest Portable    Result Date: 8/3/2018  PROCEDURE: XR CHEST PORTABLE CLINICAL INFORMATION: 70-year-old male, endotracheal and orogastric tube placement. COMPARISON: No prior study. TECHNIQUE: AP supine view of the chest. FINDINGS: Fusion hardware is seen spanning the thoracic to lumbar spine. There is an endotracheal tube which has been placed approximately 8 cm from the idalmis. The tube could be advanced 3 cm for improved positioning. An orogastric tube is seen coursing below the  level of the diaphragm with the tip in the region of the stomach. There are low lung volumes. There is cardiomegaly. There is no significant pleural effusion or pneumothorax. 1. Placement of endotracheal tube and orogastric tubes. The endotracheal tube is approximately 8 cm from the idalmis and could be advanced approximately 3-4 cm for improved placement. The orogastric tube appears to be in appropriate positioning. effect. On the FLAIR and T2-weighted sequences, there is normal signal intensity in the brain. On the gradient echo T2-weighted images, there is mineralization in the medial aspects of the basal ganglia. No other areas of susceptibility artifact are present. There is no abnormal enhancement in the brain. The major intracranial vascular flow voids are present. The midline craniocervical junction structures are normal.  The brainstem and pituitary gland are normal.     1. Generalized mild cerebral atrophy. No active pathology present. **This report has been created using voice recognition software. It may contain minor errors which are inherent in voice recognition technology. ** Final report electronically signed by Dr. Moustapha Villanueva on 8/2/2018 12:15 AM      Assessment/Recommendations    1. Metastatic Prostate Cancer - S/P prostatectomy 2014. Evaluated by Radiation Oncology, Dr. Porfirio Adams, as outpatient. Multiple spinal metastasis noted on CT and MRI scans as noted above.    -Overall prognosis in regards to the patient's prostate cancer is dependent on control of his cancer. He will need radiation treatment and has been started on hormonal therapy. If he responds to hormone treatment his prognosis is in years              -continue Casodex. Will begin Lupron monthly injection once patient discharged as Lupron is unavailable as an inpatient. 2. Acute Spinal Cord Compression - with pathologic fracture of T8- with paraplegia  - Neurosurgery following. S/p placement of pedicle screws T5-6-7 and T10-11, removal of posterior elements T8, T9, removal of epidural metastatic tumor posterolaterally T8-9, transpedicular removal of T8 vertebral body involvement by tumor, bilateral sectioning of segmental nerve roots at T8, spine navigation by Dr. Heath Camarena on 8/3/18. -on IV dexamethasone 3 mg every 12 hours. Continue prophylactic PPI and nystatin.    -post-op per neurosurgery. PT/OT following, PM&R following.    3.

## 2018-08-08 NOTE — PROGRESS NOTES
Physical Medicine & Rehabilitation   Progress Note    Chief Complaint:  Acute T8 spinal cord compression due to metastasis and pathologic fracture s/p surgical decompression on 8/3/2018. Rehab needs. Subjective:  Patient seen lying in bed. GI has seen him for ileus, he is getting relistor as well as suppositories. Patient states he feels better, less distended and has been passing some gas. No BM yet. Patient also shares with me that his hemoglobin was low this morning, and that he will be getting blood transfusion today. Patient tells me today that he has some sensation in his right thigh, and he thinks he is starting to feel bottom of left foot. Discussed ongoing monitoring of therapies as he continues to get medical treatment. Patient is in agreement. Will need to establish goals of care for patient, given the spinal cord injury and metastatic prostate cancer. Reached out to oncology to help us with prognosis, and will also consult palliative care to help patient and family establish goals for the future as far as medical treatment and therapies for spinal cord injury. Rehabilitation:  Physical therapy: FIMS:  Bed Mobility: Scooting: Dependent/Total (x2 in sitting to edge of bed, )  FIMS:  , PT Equipment Recommendations  Other: cont to assess needs, Assessment: pt tolerated fair, pain in back and abdomen, muscle spasms and extensor tone BLE, dec balance, dec coordination and strength BLE/trunk, +2 for safe mobility, recommend cont PT to inc pt functional mobility    Occupational therapy: FIMS:   ,  , Assessment: Pt demo significant deficits in BLE, as well as tone & surgical pain. Continued OT recommended to educate Pt on compensatory strategies for ADLs & further assess safe t/f technique.        Review of Systems:  CONSTITUTIONAL:  negative  RESPIRATORY:  positive for  dyspnea and oxygen per nasal cannula  CARDIOVASCULAR:  negative  GASTROINTESTINAL:  positive for nausea, abdominal pain and abdominal distention  GENITOURINARY:  negative  MUSCULOSKELETAL:  negative for  myalgias, joint swelling, stiff joints, decreased range of motion and muscle weakness  NEUROLOGICAL:  positive for coordination problems, gait problems, weakness, numbness, pain and tingling  BEHAVIOR/PSYCH:  positive for depressed mood  System review otherwise negative    Objective:  BP (!) 121/58   Pulse 90   Temp 98.1 °F (36.7 °C) (Axillary)   Resp 20   Ht 5' 11\" (1.803 m)   Wt 299 lb 1 oz (135.7 kg)   SpO2 93%   BMI 41.71 kg/m²     awake  Orientation:   person, place, time  Mood: depressed  Affect: depressed  General appearance: Patient is well nourished, well developed, well groomed and in no acute distress, obese, appearing stated age, flattened affect    Memory:   normal,   Attention/Concentration: normal  Language:  normal    Cranial Nerves:  cranial nerves II-XII are grossly intact  ROM:  abnormal - bilateral lower limbs  Motor Exam:  5/5 bilateral upper extremities. Patient attempting to move his lower extremities, observes some slight movement in bilateral LE, R>L. Question if this is purposeful  Tone:  abnormal - extensor tone bilateral lower limbs  Muscle bulk: within normal limits  Sensory:  Diminished at T10 dermatome level, patient dose feel that has feeling in right thigh, as well as possibly the bottom of left foot.    Coordination:   abnormal - bilateral lower limbs    Skin: warm and dry, no rash or erythema  Peripheral vascular: Pulses: Normal upper and lower extremity pulses; Edema: 2+ bilateral lower limbs      Diagnostics:   Recent Results (from the past 24 hour(s))   POCT Glucose    Collection Time: 08/07/18  1:12 PM   Result Value Ref Range    POC Glucose 206 (H) 70 - 108 mg/dl   POCT glucose    Collection Time: 08/07/18  5:48 PM   Result Value Ref Range    POC Glucose 195 (H) 70 - 108 mg/dl   POCT Glucose    Collection Time: 08/07/18  8:38 PM   Result Value Ref Range    POC Glucose 216 (H) 70 - 108 mg/dl radiation  · Anemia  · Type II diabetes mellitus with hyperglycemia, uncontrolled  · COPD  · Hypothyroidism  · Morbid obesity  · ED  · Acute respiratory failure requiring intubation, extubated 8/5/2018  · GERD  · Asthma  · Arthritis  · Anxiety  · Depression  · Gait dysfunction  · Deficits with ADL's    Plan:  · Continue current therapies  · Bilateral foot drop boots  · Continue baclofen 10 mg TID, monitor tone  · GI is managing ileus and bowel care at this point  · Palliative care consult to establish goals for therapies and spinal cord injury    Missed Therapy Time:  · None    Nena Batista, APRN - CNP

## 2018-08-08 NOTE — PROGRESS NOTES
Madison Mays 60  INPATIENT OCCUPATIONAL THERAPY  [facility] NEUROSCIENCES 4A  DAILY NOTE    Time:  Time In: 1300  Time Out: 1345  Timed Code Treatment Minutes: 38 Minutes  Minutes: 45   Partial Co-tx with PT d/t medical complexity of Pt       Date: 2018  Patient Name: Bran Rene,   Gender: male      Room: [facility]12/012-A  MRN: [id number]  : 1955  (61 y.o.)  Referring Practitioner: Dr. Frank Beltrán  Diagnosis: cord compression  Additional Pertinent Hx: Per ER note on 18:  61 y.o. male who presents to Emergency Department with gradually worsening leg weakness and lumbar back pain. Patient has history of prostates cancer diagnosis in  with a prostatectomy performed at [facility] and proceeded to receive radiation. The patient presented to [facility] ED for gradually worsening lumbar back pain and intermittent bilateral leg weakness where he was treated with Valium and morphine. Patient was discharged the same day and consulted with his PCP. His PCP became concerned his symptoms were metastatic and ordered a bone scan on 18 where patient had presence of T7 metastasis. s/p: S/P  bilateral pedicle screws in T5, T6, T7, T10 and T11 + posterior decompression at T8-T9 on 8/3/18 Per Onocology note: He had findings of metastasis of the C7 vertebra, multiple thoracic vertebra and lumbar vertebra metastasis.      Past Medical History:   Diagnosis Date    Anxiety     Arthritis     Asthma     [address] ([facility] [address]     prostate    COPD (chronic obstructive pulmonary disease) ([facility] [address]     Depression     Diabetes mellitus (HCC)     GERD (gastroesophageal reflux disease)     Neuropathy     Pneumonia      Past Surgical History:   Procedure Laterality Date    CARPAL TUNNEL RELEASE Bilateral     CERVICAL DISCECTOMY      COLONOSCOPY      ENDOSCOPY, COLON, DIAGNOSTIC      EYE SURGERY      HERNIA REPAIR      LUMBAR SPINE SURGERY      NASAL SEPTUM SURGERY      LA OFFICE/OUTPT VISIT,PROCEDURE ONLY N/A 8/2/2018    TRANSPEDICULAR VERTEBRECTOMY AND CAGE INSERTION OF T8 WITH POSTERIOR INSTRUMENTATION OF THORACIC SPINE AND TUMOR REMOVAL performed by Cain Ramos MD at [address]         Restrictions/Precautions:  Fall Risk                    Spinal Precautions: No Bending, No Lifting, No Twisting  Other position/activity restrictions: CHANDRIKA drain       Prior Level of Function:  ADL Assistance: Independent  Homemaking Assistance: Independent  Ambulation Assistance: Independent  Transfer Assistance: Independent  Additional Comments: Pt reports he was ambulating without AD PLOF. Pt reports pain, numbness, & unsteadiness with mobility at home since April 2018. Subjective       Subjective: cooperative, motivated              Pain:  Pain Assessment  Patient Currently in Pain: Yes (surgical site & L buttock)  Pain Level: 3       Objective  Overall Cognitive Status: WNL                                                                   ADL  Grooming: Minimal assistance  LE Dressing: Dependent/Total (for donning B slipper socks)          Bed mobility  Rolling to Right: Maximum assistance (x 1), max A for knee flexion for hook lying-noted extensor tone with initial attempt to bend knees RLE>LLE  Supine to Sit: Max A x 2  Sit to Supine: Mod A x 1, max A x 1   Scooting out toward EOB with hips with mod A x 1 after tactile cues for positioning self for task  Scooting: Moderate assistance (x 2 for  (3) lateral scoots toward HOB)          Balance  Sitting Balance: Minimal assistance (-mod A with 1 UE release) Sat EOB x 21 min total. Pt required vcs for not holding his breath. Completed lateral prop mod A x 1, CGA x 1 completed R & L with same A level. Therapist provided tactile cues & blocking at B knee ready in case extensor tone kicked in. Pt had tendency to lean posteriorly & to R side throughout session-responded well to tactile & vcs to get to midline.                 Activity Tolerance:  Activity Tolerance: Patient limited by fatigue    Assessment:     Performance deficits / Impairments: Decreased functional mobility , Decreased ADL status, Decreased sensation, Decreased endurance, Decreased balance, Decreased strength, Decreased high-level IADLs  Prognosis: Fair  Discharge Recommendations: Continue to assess pending progress    Patient Education:  Patient Education: OT role, POC, log rolling technique  Barriers to Learning: none    Equipment Recommendations: Other: Continue to assess pending progress    Safety:  Safety Devices in place: Yes  Type of devices:  All fall risk precautions in place, Gait belt, Call light within reach    Plan:  Times per week: 6x  Current Treatment Recommendations: Functional Mobility Training, Safety Education & Training, Self-Care / ADL, Balance Training, Endurance Training, Strengthening    Goals:  Patient goals : go home    Short term goals  Time Frame for Short term goals: 2 weeks  Short term goal 1: Complete BUE light strengthening HEP with min RBs to increase UB endurance to A with t/fs NOT MET, CONTINUE  Short term goal 2: Tolerate further assessment of EOB & t/fs by OTR when appropriate MET, REVISE  Long term goals  Time Frame for Long term goals : No LTG set d/t short ELOS    Revised Short-Term Goals  Short term goals  Time Frame for Short term goals: 2 weeks  Short term goal 1: Complete BUE light strengthening HEP with min RBs to increase UB endurance to A with t/fs  Short term goal 2: Complete 10-12 min EOB sitting with 1UE release with 0>min A x 1 for eventual EOB ADLs  Short term goal 3: Tolerate further assessment of t/fs by OTR when appropriate  Long term goals  Time Frame for Long term goals : No LTG set d/t short ELOS

## 2018-08-09 ENCOUNTER — APPOINTMENT (OUTPATIENT)
Dept: GENERAL RADIOLOGY | Age: 63
DRG: 518 | End: 2018-08-09
Payer: MEDICARE

## 2018-08-09 LAB
BASOPHILS # BLD: 0 %
BASOPHILS ABSOLUTE: 0 THOU/MM3 (ref 0–0.1)
CALCIUM IONIZED: 1.02 MMOL/L (ref 1.12–1.32)
EOSINOPHIL # BLD: 0 %
EOSINOPHILS ABSOLUTE: 0 THOU/MM3 (ref 0–0.4)
ERYTHROCYTE [DISTWIDTH] IN BLOOD BY AUTOMATED COUNT: 14.9 % (ref 11.5–14.5)
ERYTHROCYTE [DISTWIDTH] IN BLOOD BY AUTOMATED COUNT: 48.1 FL (ref 35–45)
GLUCOSE BLD-MCNC: 139 MG/DL (ref 70–108)
GLUCOSE BLD-MCNC: 176 MG/DL (ref 70–108)
GLUCOSE BLD-MCNC: 183 MG/DL (ref 70–108)
GLUCOSE BLD-MCNC: 265 MG/DL (ref 70–108)
HCT VFR BLD CALC: 27.3 % (ref 42–52)
HEMOGLOBIN: 8.9 GM/DL (ref 14–18)
LYMPHOCYTES # BLD: 15 %
LYMPHOCYTES ABSOLUTE: 2.2 THOU/MM3 (ref 1–4.8)
MCH RBC QN AUTO: 30.4 PG (ref 26–33)
MCHC RBC AUTO-ENTMCNC: 32.6 GM/DL (ref 32.2–35.5)
MCV RBC AUTO: 93.2 FL (ref 80–94)
MONOCYTES # BLD: 6 %
MONOCYTES ABSOLUTE: 0.9 THOU/MM3 (ref 0.4–1.3)
NUCLEATED RED BLOOD CELLS: 2 /100 WBC
PLATELET # BLD: 248 THOU/MM3 (ref 130–400)
PLATELET ESTIMATE: ADEQUATE
PMV BLD AUTO: 10.5 FL (ref 9.4–12.4)
POTASSIUM SERPL-SCNC: 4 MEQ/L (ref 3.5–5.2)
RBC # BLD: 2.93 MILL/MM3 (ref 4.7–6.1)
SEG NEUTROPHILS: 79 %
SEGMENTED NEUTROPHILS ABSOLUTE COUNT: 11.6 THOU/MM3 (ref 1.8–7.7)
WBC # BLD: 14.7 THOU/MM3 (ref 4.8–10.8)

## 2018-08-09 PROCEDURE — 97535 SELF CARE MNGMENT TRAINING: CPT

## 2018-08-09 PROCEDURE — 97530 THERAPEUTIC ACTIVITIES: CPT

## 2018-08-09 PROCEDURE — 99232 SBSQ HOSP IP/OBS MODERATE 35: CPT | Performed by: NURSE PRACTITIONER

## 2018-08-09 PROCEDURE — 6360000002 HC RX W HCPCS: Performed by: INTERNAL MEDICINE

## 2018-08-09 PROCEDURE — 99024 POSTOP FOLLOW-UP VISIT: CPT | Performed by: NEUROLOGICAL SURGERY

## 2018-08-09 PROCEDURE — 74018 RADEX ABDOMEN 1 VIEW: CPT

## 2018-08-09 PROCEDURE — 2700000000 HC OXYGEN THERAPY PER DAY

## 2018-08-09 PROCEDURE — 97110 THERAPEUTIC EXERCISES: CPT

## 2018-08-09 PROCEDURE — 6370000000 HC RX 637 (ALT 250 FOR IP): Performed by: PHYSICAL MEDICINE & REHABILITATION

## 2018-08-09 PROCEDURE — 85025 COMPLETE CBC W/AUTO DIFF WBC: CPT

## 2018-08-09 PROCEDURE — 6370000000 HC RX 637 (ALT 250 FOR IP): Performed by: INTERNAL MEDICINE

## 2018-08-09 PROCEDURE — 6360000002 HC RX W HCPCS: Performed by: NEUROLOGICAL SURGERY

## 2018-08-09 PROCEDURE — 6370000000 HC RX 637 (ALT 250 FOR IP): Performed by: PHYSICIAN ASSISTANT

## 2018-08-09 PROCEDURE — 82330 ASSAY OF CALCIUM: CPT

## 2018-08-09 PROCEDURE — 6370000000 HC RX 637 (ALT 250 FOR IP): Performed by: NURSE PRACTITIONER

## 2018-08-09 PROCEDURE — 82948 REAGENT STRIP/BLOOD GLUCOSE: CPT

## 2018-08-09 PROCEDURE — 94640 AIRWAY INHALATION TREATMENT: CPT

## 2018-08-09 PROCEDURE — 2709999900 HC NON-CHARGEABLE SUPPLY

## 2018-08-09 PROCEDURE — 2060000000 HC ICU INTERMEDIATE R&B

## 2018-08-09 PROCEDURE — 36415 COLL VENOUS BLD VENIPUNCTURE: CPT

## 2018-08-09 PROCEDURE — 84132 ASSAY OF SERUM POTASSIUM: CPT

## 2018-08-09 RX ORDER — NEOSTIGMINE METHYLSULFATE 1 MG/ML
1 INJECTION, SOLUTION INTRAVENOUS EVERY 4 HOURS
Status: COMPLETED | OUTPATIENT
Start: 2018-08-09 | End: 2018-08-10

## 2018-08-09 RX ORDER — TRAZODONE HYDROCHLORIDE 50 MG/1
50 TABLET ORAL NIGHTLY PRN
Status: DISCONTINUED | OUTPATIENT
Start: 2018-08-09 | End: 2018-08-18 | Stop reason: ALTCHOICE

## 2018-08-09 RX ADMIN — BACLOFEN 10 MG: 10 TABLET ORAL at 09:21

## 2018-08-09 RX ADMIN — GABAPENTIN 1200 MG: 600 TABLET, FILM COATED ORAL at 22:23

## 2018-08-09 RX ADMIN — NYSTATIN 500000 UNITS: 100000 SUSPENSION ORAL at 13:17

## 2018-08-09 RX ADMIN — BISACODYL 10 MG: 10 SUPPOSITORY RECTAL at 09:31

## 2018-08-09 RX ADMIN — GABAPENTIN 1800 MG: 600 TABLET, FILM COATED ORAL at 11:31

## 2018-08-09 RX ADMIN — BACLOFEN 10 MG: 10 TABLET ORAL at 22:23

## 2018-08-09 RX ADMIN — FUROSEMIDE 10 MG: 10 INJECTION, SOLUTION INTRAMUSCULAR; INTRAVENOUS at 00:30

## 2018-08-09 RX ADMIN — LEVOTHYROXINE SODIUM 50 MCG: 50 TABLET ORAL at 06:30

## 2018-08-09 RX ADMIN — POLYETHYLENE GLYCOL 3350 17 G: 17 POWDER, FOR SOLUTION ORAL at 09:24

## 2018-08-09 RX ADMIN — NEOSTIGMINE METHYLSULFATE 1 MG: 1 INJECTION, SOLUTION INTRAVENOUS at 22:50

## 2018-08-09 RX ADMIN — DOCUSATE SODIUM 100 MG: 100 CAPSULE, LIQUID FILLED ORAL at 22:22

## 2018-08-09 RX ADMIN — BICALUTAMIDE 50 MG: 50 TABLET, FILM COATED ORAL at 09:28

## 2018-08-09 RX ADMIN — FLUTICASONE FUROATE AND VILANTEROL 1 APPLICATOR: 200; 25 POWDER RESPIRATORY (INHALATION) at 07:48

## 2018-08-09 RX ADMIN — IPRATROPIUM BROMIDE AND ALBUTEROL SULFATE 1 AMPULE: .5; 3 SOLUTION RESPIRATORY (INHALATION) at 07:46

## 2018-08-09 RX ADMIN — BACLOFEN 10 MG: 10 TABLET ORAL at 13:17

## 2018-08-09 RX ADMIN — DEXAMETHASONE SODIUM PHOSPHATE 3 MG: 4 INJECTION, SOLUTION INTRA-ARTICULAR; INTRALESIONAL; INTRAMUSCULAR; INTRAVENOUS; SOFT TISSUE at 05:26

## 2018-08-09 RX ADMIN — GABAPENTIN 1200 MG: 600 TABLET, FILM COATED ORAL at 09:21

## 2018-08-09 RX ADMIN — INSULIN LISPRO 2 UNITS: 100 INJECTION, SOLUTION INTRAVENOUS; SUBCUTANEOUS at 20:28

## 2018-08-09 RX ADMIN — IPRATROPIUM BROMIDE AND ALBUTEROL SULFATE 1 AMPULE: .5; 3 SOLUTION RESPIRATORY (INHALATION) at 17:05

## 2018-08-09 RX ADMIN — TRAZODONE HYDROCHLORIDE 50 MG: 50 TABLET ORAL at 22:23

## 2018-08-09 RX ADMIN — DULOXETINE HYDROCHLORIDE 60 MG: 60 CAPSULE, DELAYED RELEASE ORAL at 09:21

## 2018-08-09 RX ADMIN — Medication 1 UNITS: at 13:15

## 2018-08-09 RX ADMIN — DEXAMETHASONE SODIUM PHOSPHATE 3 MG: 4 INJECTION, SOLUTION INTRA-ARTICULAR; INTRALESIONAL; INTRAMUSCULAR; INTRAVENOUS; SOFT TISSUE at 18:06

## 2018-08-09 RX ADMIN — INSULIN GLARGINE 30 UNITS: 100 INJECTION, SOLUTION SUBCUTANEOUS at 09:28

## 2018-08-09 RX ADMIN — OXYCODONE HYDROCHLORIDE AND ACETAMINOPHEN 1 TABLET: 5; 325 TABLET ORAL at 19:50

## 2018-08-09 RX ADMIN — FUROSEMIDE 10 MG: 10 INJECTION, SOLUTION INTRAMUSCULAR; INTRAVENOUS at 18:07

## 2018-08-09 RX ADMIN — NYSTATIN 500000 UNITS: 100000 SUSPENSION ORAL at 18:04

## 2018-08-09 RX ADMIN — NYSTATIN 500000 UNITS: 100000 SUSPENSION ORAL at 22:23

## 2018-08-09 RX ADMIN — IPRATROPIUM BROMIDE AND ALBUTEROL SULFATE 1 AMPULE: .5; 3 SOLUTION RESPIRATORY (INHALATION) at 20:43

## 2018-08-09 RX ADMIN — Medication 1 UNITS: at 09:28

## 2018-08-09 RX ADMIN — NYSTATIN 500000 UNITS: 100000 SUSPENSION ORAL at 09:30

## 2018-08-09 RX ADMIN — IPRATROPIUM BROMIDE AND ALBUTEROL SULFATE 1 AMPULE: .5; 3 SOLUTION RESPIRATORY (INHALATION) at 13:01

## 2018-08-09 RX ADMIN — OXYCODONE HYDROCHLORIDE AND ACETAMINOPHEN 1 TABLET: 5; 325 TABLET ORAL at 11:29

## 2018-08-09 RX ADMIN — FUROSEMIDE 10 MG: 10 INJECTION, SOLUTION INTRAMUSCULAR; INTRAVENOUS at 09:19

## 2018-08-09 RX ADMIN — SENNOSIDES 17.2 MG: 8.6 TABLET, FILM COATED ORAL at 22:23

## 2018-08-09 RX ADMIN — DOCUSATE SODIUM 100 MG: 100 CAPSULE, LIQUID FILLED ORAL at 09:25

## 2018-08-09 ASSESSMENT — PAIN SCALES - GENERAL
PAINLEVEL_OUTOF10: 0
PAINLEVEL_OUTOF10: 4
PAINLEVEL_OUTOF10: 5
PAINLEVEL_OUTOF10: 2
PAINLEVEL_OUTOF10: 5
PAINLEVEL_OUTOF10: 0

## 2018-08-09 ASSESSMENT — PAIN DESCRIPTION - ORIENTATION
ORIENTATION: MID
ORIENTATION: LEFT

## 2018-08-09 ASSESSMENT — PAIN DESCRIPTION - DESCRIPTORS
DESCRIPTORS: DULL;DISCOMFORT
DESCRIPTORS: ACHING

## 2018-08-09 ASSESSMENT — PAIN DESCRIPTION - LOCATION
LOCATION: BACK
LOCATION: LEG

## 2018-08-09 ASSESSMENT — PAIN DESCRIPTION - PAIN TYPE
TYPE: SURGICAL PAIN
TYPE: ACUTE PAIN

## 2018-08-09 ASSESSMENT — PAIN DESCRIPTION - FREQUENCY: FREQUENCY: CONTINUOUS

## 2018-08-09 NOTE — PROGRESS NOTES
Patient's I-jonh is 1.02, per  protocol, pharmacy is not replacing calcium when I john is > 1 due to calcium shortage.

## 2018-08-09 NOTE — PROGRESS NOTES
Gastroenterology  Progress Note    8/9/2018 5:19 PM  Subjective:   Admit Date: 8/1/2018    Interval History:  Labs reviewed, stable. Pt has passed flatus today but no BM. Abdomen remains distended although not as taut, still not soft. Denies N/V. Pt has increased sensation in BLE and is able to move his left leg; c/o \"tingling\" in right foot. Denies abdominal pain.       Diet: DIET CLEAR LIQUID;    Patient Active Problem List:     Cord compression ([facility] [address]     Acute myelopathy ([facility] [address]     Prostate cancer, primary, with metastasis from prostate to other site [facility])     Respiratory failure requiring intubation ([facility] [address]        Medications:   Scheduled Meds:   dexamethasone  3 mg Intravenous Q12H    docusate sodium  100 mg Oral BID    senna  2 tablet Oral Nightly    furosemide  10 mg Intravenous Q8H    gabapentin  1,800 mg Oral Daily    gabapentin  1,200 mg Oral BID    bisacodyl  10 mg Rectal Daily    baclofen  10 mg Oral TID    polyethylene glycol  17 g Oral Daily    insulin glargine  30 Units Subcutaneous Daily    methylnaltrexone  0.15 mg/kg Subcutaneous Q48H    insulin lispro  0-6 Units Subcutaneous TID WC    insulin lispro  0-3 Units Subcutaneous Nightly    ipratropium-albuterol  1 ampule Inhalation Q4H WA    calcium replacement protocol   Other RX Placeholder    potassium (CARDIAC) replacement protocol   Other RX Placeholder    magnesium replacement protocol   Other RX Placeholder    DULoxetine  60 mg Oral Daily    nystatin  5 mL Oral 4x Daily    bicalutamide  50 mg Oral Daily    Fluticasone Furoate-Vilanterol  1 applicator Inhalation Daily    levothyroxine  50 mcg Oral Daily     Continuous Infusions:   dextrose       CBC:   Recent Labs      08/07/18   0420  08/08/18   0535  08/09/18   0540   WBC  12.7*  13.0*  14.7*   HGB  7.1*  6.9*  8.9*   PLT  175  200  248     BMP:    Recent Labs      08/07/18   0420  08/08/18   0535  08/09/18   0540   NA  139  138   --    K  4.4  3.6  4.0   CL  103  100 mets .   COMPARISON: None.   TECHNIQUE: 5 mm axial CT images were obtained through the abdomen and pelvis before and after the administration of intravenous and oral contrast. Coronal and sagittal reconstructions were obtained.   All CT scans at this facility use dose modulation, iterative reconstruction, and/or weight-based dosing when appropriate to reduce radiation dose to as low as reasonably achievable.     FINDINGS:  Kody Danielson is basilar fibrolinear scarring left greater than right. The noncontrast appearance of the cardiac chambers, gallbladder, pancreas and spleen are negative for active pathology with mild fatty replacement of the liver as well as pancreas noted. There is no biliary obstruction. Left and right kidney demonstrate nonobstructing punctate calculi. There is trace contrast retained from previous images, correlate with mild perinephric stranding associated with nephritis. The small bowel and colon are   negative for gross obstruction or inflammatory wall changes. A few scattered colonic diverticula changes are present. The ventral abdominal wall image 50 postsurgical mesh from hernia repair near the umbilicus.   There is contrast within the urinary bladder. Postsurgical instrumentation of the lumbar sacral spine are present with disc graft fusion at L3, L4-5, and L5-S1.   The T8 sclerotic lesion with compression deformities are again noted. Metastatic changes again are suspected. Additional sclerotic foci within several vertebral of the lower thoracic and lumbar spine persists considered with metastatic change to the   skeleton.      Impression  1. T8 vertebral pathologic appearing fracture with sclerosis suggesting metastatic changes of the skeleton with additional lesions within the lower thoracic and lumbar segments.   No obvious high-grade stenosis of the central canal visualized.   Chronic degenerative changes of lumbar spine are visualized.   Postsurgical changes of the ventral abdominal wall CNP  8/9/2018  5:19 PM     Very distended abdomin, will give 2 dose or neostigmine today pharmacy to dose.      Patient is seen independently from the nurse practitioner and all  the pertinent data along with physical examination and assessment and plans are all obtained by my self and  Laboratory data, Radiology results, medications all are reviewed by my self and care is discussed extensively with the patient  and the patients nurse and all agree with plan and in addition see orders and plans    Electronically signed by Carrie Morales MD on 8/9/2018 at 6:40 PM

## 2018-08-09 NOTE — PROGRESS NOTES
Madison Mays 60  INPATIENT OCCUPATIONAL THERAPY  [facility] NEUROSCIENCES 4A  DAILY NOTE    Time:  Time In: 5827  Time Out: 1022  Timed Code Treatment Minutes: 48 Minutes  Minutes: 53        Date: 2018  Patient Name: Ruma See,   Gender: male      Room: [facility]12/012-A  MRN: [id number]  : 1955  (61 y.o.)  Referring Practitioner: Dr. Marta Burrows  Diagnosis: cord compression  Additional Pertinent Hx: Per ER note on 18:  61 y.o. male who presents to Emergency Department with gradually worsening leg weakness and lumbar back pain. Patient has history of prostates cancer diagnosis in  with a prostatectomy performed at [facility] and proceeded to receive radiation. The patient presented to [facility] ED for gradually worsening lumbar back pain and intermittent bilateral leg weakness where he was treated with Valium and morphine. Patient was discharged the same day and consulted with his PCP. His PCP became concerned his symptoms were metastatic and ordered a bone scan on 18 where patient had presence of T7 metastasis. s/p: S/P  bilateral pedicle screws in T5, T6, T7, T10 and T11 + posterior decompression at T8-T9 on 8/3/18 Per Onocology note: He had findings of metastasis of the C7 vertebra, multiple thoracic vertebra and lumbar vertebra metastasis.      Past Medical History:   Diagnosis Date    Anxiety     Arthritis     Asthma     Cancer ([address]     prostate    COPD (chronic obstructive pulmonary disease) ([facility] [address]     Depression     Diabetes mellitus (HCC)     GERD (gastroesophageal reflux disease)     Neuropathy     Pneumonia      Past Surgical History:   Procedure Laterality Date    CARPAL TUNNEL RELEASE Bilateral     CERVICAL DISCECTOMY      COLONOSCOPY      ENDOSCOPY, COLON, DIAGNOSTIC      EYE SURGERY      HERNIA REPAIR      LUMBAR SPINE SURGERY      NASAL SEPTUM SURGERY      ME OFFICE/OUTPT VISIT,PROCEDURE ONLY N/A 2018    TRANSPEDICULAR VERTEBRECTOMY AND CAGE

## 2018-08-09 NOTE — CARE COORDINATION
8/9/18, 10:23 AM      Kia  day: 8  Location: -12/012-A Reason for admit: Cord compression [facility]) [G95.20]   Procedure: KUB 8/9/18    Impression:       Diffuse ileus with both the gas-filled loops of small bowel and dilated colon. Continued progress imaging suggested. Previously seen NG tube is not identified on this image. Treatment Plan of Care: NG tube placed to low intermittent suction, bowel regimen of miralax, dulcolax and rectal stim. PT/OT continue, IV steroids continue, pain control.   PCP: Erika Meier MD  Readmission Risk Score: 23%  Discharge Plan: Rehab vs ECF

## 2018-08-09 NOTE — CARE COORDINATION
8/9/18, 3:30 PM    DISCHARGE BARRIERS  Attempted to speak to patient about other options if Inpatient Rehab can't take. SW has been unable to see patient as he has been with other staff.

## 2018-08-09 NOTE — PROGRESS NOTES
Madison Mays 60  INPATIENT OCCUPATIONAL THERAPY  [facility] NEUROSCIENCES 4A  DAILY NOTE    Time:  Time In: 8522  Time Out: 1022  Timed Code Treatment Minutes: 48 Minutes  Minutes: 53          Date: 2018  Patient Name: Justin Rodríguez,   Gender: male      Room: [facility]12/012-A  MRN: [id number]  : 1955  (61 y.o.)  Referring Practitioner: Dr. Carmelo Schneider  Diagnosis: cord compression  Additional Pertinent Hx: Per ER note on 18:  61 y.o. male who presents to Emergency Department with gradually worsening leg weakness and lumbar back pain. Patient has history of prostates cancer diagnosis in  with a prostatectomy performed at [facility] and proceeded to receive radiation. The patient presented to [facility] ED for gradually worsening lumbar back pain and intermittent bilateral leg weakness where he was treated with Valium and morphine. Patient was discharged the same day and consulted with his PCP. His PCP became concerned his symptoms were metastatic and ordered a bone scan on 18 where patient had presence of T7 metastasis. s/p: S/P  bilateral pedicle screws in T5, T6, T7, T10 and T11 + posterior decompression at T8-T9 on 8/3/18 Per Onocology note: He had findings of metastasis of the C7 vertebra, multiple thoracic vertebra and lumbar vertebra metastasis.      Past Medical History:   Diagnosis Date    Anxiety     Arthritis     Asthma     Cancer ([address]     prostate    COPD [facility]chronic obstructive [facility] disease) [facility]     Depression     Diabetes mellitus (HCC)     GERD (gastroesophageal reflux disease)     Neuropathy     Pneumonia      Past Surgical History:   Procedure Laterality Date    CARPAL TUNNEL RELEASE Bilateral     CERVICAL DISCECTOMY      COLONOSCOPY      ENDOSCOPY, COLON, DIAGNOSTIC      EYE SURGERY      HERNIA REPAIR      LUMBAR SPINE SURGERY      NASAL SEPTUM SURGERY      VA OFFICE/OUTPT VISIT,PROCEDURE ONLY N/A 2018    TRANSPEDICULAR VERTEBRECTOMY AND CAGE INSERTION OF T8 WITH POSTERIOR INSTRUMENTATION OF THORACIC SPINE AND TUMOR REMOVAL performed by Leslie Porter MD at [address] [place] [address]         Restrictions/Precautions:  Fall Risk                    Spinal Precautions: No Bending, No Lifting, No Twisting  Other position/activity restrictions: CHANDRIAK drain       Prior Level of Function:  ADL Assistance: Independent  Homemaking Assistance: Independent  Ambulation Assistance: Independent  Transfer Assistance: Independent  Additional Comments: Pt reports he was ambulating without AD PLOF. Pt reports pain, numbness, & unsteadiness with mobility at home since April 2018. Subjective       Subjective: Pt very pleasant and cooperative, very motivated to participate and work with therapy  Comments: RN ok'd OT         Pain:  Pain Assessment  Patient Currently in Pain:  (None stated this date)       Objective         ADL  Feeding: Increased time to complete;Contact guard assistance; Beverage management (Pt sat at EOB for increased ease with self feeding, pt requiring varying levels of assist for dynamic sitting balance pending on UE support from CGA to Mod A; pt quickly progresses from Mod to Min A with 1-2 hand release )  Grooming: Stand by assistance; Increased time to complete;Minimal assistance (sitting EOB for brushing teeth (Min A for sitting balance progressing to CGA) and supine for washing face)  UE Bathing: Increased time to complete;Minimal assistance (washing back, chest (CGA) and applying deoderant (assist only for clothing managemnet), sitting EOB)  UE Dressing: Moderate assistance (d/t drains, IV, oxygen, tubing, telemetry for changing gowns)  LE Dressing: Dependent/Total (donning and doffing slipper socks)        Bed mobility  Rolling to Right: Maximum assistance (for 2 events)  Supine to Sit: Maximum assistance (x2 persons for support at BLE and trunk, diffcutly with using BUE to assist with elevating trunk off bed)  Sit to Supine:  Moderate

## 2018-08-09 NOTE — PROGRESS NOTES
negative  MUSCULOSKELETAL:  negative for  myalgias, joint swelling, stiff joints, decreased range of motion and muscle weakness  NEUROLOGICAL:  positive for coordination problems, gait problems, weakness, numbness, pain and tingling  BEHAVIOR/PSYCH:  positive for depressed mood  System review otherwise negative    Objective:  /67   Pulse 86   Temp 97.8 °F (36.6 °C) (Oral)   Resp 18   Ht 5' 11\" (1.803 m)   Wt 289 lb 6.4 oz (131.3 kg)   SpO2 96%   BMI 40.36 kg/m²     awake  Orientation:   person, place, time  Mood: depressed  Affect: depressed  General appearance: Patient is well nourished, well developed, well groomed and in no acute distress, obese, appearing stated age, flattened affect    Memory:   normal,   Attention/Concentration: normal  Language:  normal    Cranial Nerves:  cranial nerves II-XII are grossly intact  ROM:  abnormal - bilateral lower limbs  Motor Exam:  5/5 bilateral upper extremities. Patient able to wiggle right lower extremity. Twitching seen in left lower extremity. Tone:  abnormal - extensor tone bilateral lower limbs- improving  Muscle bulk: within normal limits  Sensory:  Diminished at T10 dermatome level, patient dose feel that has feeling in right thigh, as well as possibly the bottom of left foot.    Coordination:   abnormal - bilateral lower limbs    Skin: warm and dry, no rash or erythema  Peripheral vascular: Pulses: Normal upper and lower extremity pulses; Edema: 2+ bilateral lower limbs      Diagnostics:   Recent Results (from the past 24 hour(s))   POCT Glucose    Collection Time: 08/08/18  1:08 PM   Result Value Ref Range    POC Glucose 167 (H) 70 - 108 mg/dl   POCT glucose    Collection Time: 08/08/18  5:19 PM   Result Value Ref Range    POC Glucose 190 (H) 70 - 108 mg/dl   POCT Glucose    Collection Time: 08/08/18  8:25 PM   Result Value Ref Range    POC Glucose 155 (H) 70 - 108 mg/dl   CBC Auto Differential    Collection Time: 08/09/18  5:40 AM   Result Value Ref Range    WBC 14.7 (H) 4.8 - 10.8 thou/mm3    RBC 2.93 (L) 4.70 - 6.10 mill/mm3    Hemoglobin 8.9 (L) 14.0 - 18.0 gm/dl    Hematocrit 27.3 (L) 42.0 - 52.0 %    MCV 93.2 80.0 - 94.0 fL    MCH 30.4 26.0 - 33.0 pg    MCHC 32.6 32.2 - 35.5 gm/dl    RDW-CV 14.9 (H) 11.5 - 14.5 %    RDW-SD 48.1 (H) 35.0 - 45.0 fL    Platelets 389 145 - 575 thou/mm3    MPV 10.5 9.4 - 12.4 fL    Seg Neutrophils 79.0 %    Lymphocytes 15.0 %    Monocytes 6.0 %    Eosinophils 0.0 %    Basophils 0.0 %    Platelet Estimate ADEQUATE Adequate    Segs Absolute 11.6 (H) 1.8 - 7.7 thou/mm3    Lymphocytes # 2.2 1.0 - 4.8 thou/mm3    Monocytes # 0.9 0.4 - 1.3 thou/mm3    Eosinophils # 0.0 0.0 - 0.4 thou/mm3    Basophils # 0.0 0.0 - 0.1 thou/mm3    nRBC 2 /100 wbc   Potassium    Collection Time: 08/09/18  5:40 AM   Result Value Ref Range    Potassium 4.0 3.5 - 5.2 meq/L       Impression:  · T8 spinal cord injury due to compression from tumor with pathological fracture s/p surgical tumor removal and decompression on 8/3/2018  · Incomplete paraplegia  · Metastatic prostate cancer, diagnosed in 2014, s/p prostatectomy and radiation  · Anemia  · Type II diabetes mellitus with hyperglycemia, uncontrolled  · COPD  · Hypothyroidism  · Morbid obesity  · ED  · Acute respiratory failure requiring intubation, extubated 8/5/2018  · GERD  · Asthma  · Arthritis  · Anxiety  · Depression  · Gait dysfunction  · Deficits with ADL's    Plan:  · Continue current therapies  · Bilateral foot drop boots  · Continue baclofen 10 mg TID, monitor tone. Will consider increasing dose tomorrow  · GI is managing ileus and bowel care at this point  · Will continue to follow progress with therapies as he becomes more medially stable and then will determine appropriate discharge disposition. At this point patient is mod-max assist with bed mobility, however does seem to be improving.      Missed Therapy Time:  · None    Emil Donate, APRN - CNP

## 2018-08-09 NOTE — PROGRESS NOTES
IM Progress Note  Dr. Allyn Arenas  8/9/2018 10:09 AM      Patient name Jonel Jackson  [id number]  PCP: Joe Mazariegos MD  Admit Date: 8/1/2018  Acct No. [de-identified]    Subjective: Interval History:   NGT inserted  abd still distended  Small smear per rectum + flatus      Diet: DIET CLEAR LIQUID;    I/O last 3 completed shifts: In: 2250 [P.O.:1940; Blood:310]  Out: 1631 [Urine:4325]  No intake/output data recorded. Admission weight: 279 lb (126.6 kg) as of 8/1/2018  5:04 PM  Wt Readings from Last 3 Encounters:   08/09/18 289 lb 6.4 oz (131.3 kg)     Body mass index is 40.36 kg/m².           Medications:   Scheduled Meds:   dexamethasone  3 mg Intravenous Q12H    docusate sodium  100 mg Oral BID    senna  2 tablet Oral Nightly    furosemide  10 mg Intravenous Q8H    gabapentin  1,800 mg Oral Daily    gabapentin  1,200 mg Oral BID    bisacodyl  10 mg Rectal Daily    baclofen  10 mg Oral TID    polyethylene glycol  17 g Oral Daily    insulin glargine  30 Units Subcutaneous Daily    methylnaltrexone  0.15 mg/kg Subcutaneous Q48H    insulin lispro  0-6 Units Subcutaneous TID WC    insulin lispro  0-3 Units Subcutaneous Nightly    ipratropium-albuterol  1 ampule Inhalation Q4H WA    calcium replacement protocol   Other RX Placeholder    potassium (CARDIAC) replacement protocol   Other RX Placeholder    magnesium replacement protocol   Other RX Placeholder    DULoxetine  60 mg Oral Daily    nystatin  5 mL Oral 4x Daily    bicalutamide  50 mg Oral Daily    Fluticasone Furoate-Vilanterol  1 applicator Inhalation Daily    levothyroxine  50 mcg Oral Daily     Continuous Infusions:   dextrose         Labs :     CBC:   Recent Labs      08/07/18   0420  08/08/18   0535  08/09/18   0540   WBC  12.7*  13.0*  14.7*   HGB  7.1*  6.9*  8.9*   PLT  175  200  248     BMP:    Recent Labs      08/07/18   0420  08/08/18   0535  08/09/18   0540   NA  139  138   --    K  4.4  3.6  4.0   CL  103  100   --

## 2018-08-10 ENCOUNTER — APPOINTMENT (OUTPATIENT)
Dept: GENERAL RADIOLOGY | Age: 63
DRG: 518 | End: 2018-08-10
Payer: MEDICARE

## 2018-08-10 LAB
ANION GAP SERPL CALCULATED.3IONS-SCNC: 12 MEQ/L (ref 8–16)
ANISOCYTOSIS: PRESENT
BASOPHILIA: ABNORMAL
BASOPHILS # BLD: 0 %
BASOPHILS ABSOLUTE: 0 THOU/MM3 (ref 0–0.1)
BUN BLDV-MCNC: 18 MG/DL (ref 7–22)
CALCIUM SERPL-MCNC: 7.9 MG/DL (ref 8.5–10.5)
CHLORIDE BLD-SCNC: 96 MEQ/L (ref 98–111)
CO2: 30 MEQ/L (ref 23–33)
CREAT SERPL-MCNC: 0.5 MG/DL (ref 0.4–1.2)
EOSINOPHIL # BLD: 0 %
EOSINOPHILS ABSOLUTE: 0 THOU/MM3 (ref 0–0.4)
ERYTHROCYTE [DISTWIDTH] IN BLOOD BY AUTOMATED COUNT: 14.8 % (ref 11.5–14.5)
ERYTHROCYTE [DISTWIDTH] IN BLOOD BY AUTOMATED COUNT: 47 FL (ref 35–45)
GFR SERPL CREATININE-BSD FRML MDRD: > 90 ML/MIN/1.73M2
GLUCOSE BLD-MCNC: 140 MG/DL (ref 70–108)
GLUCOSE BLD-MCNC: 144 MG/DL (ref 70–108)
GLUCOSE BLD-MCNC: 169 MG/DL (ref 70–108)
GLUCOSE BLD-MCNC: 206 MG/DL (ref 70–108)
GLUCOSE BLD-MCNC: 225 MG/DL (ref 70–108)
HCT VFR BLD CALC: 27.2 % (ref 42–52)
HEMOGLOBIN: 8.7 GM/DL (ref 14–18)
LACTIC ACID: 2.3 MMOL/L (ref 0.5–2.2)
LYMPHOCYTES # BLD: 8 %
LYMPHOCYTES ABSOLUTE: 1.2 THOU/MM3 (ref 1–4.8)
MAGNESIUM: 2.2 MG/DL (ref 1.6–2.4)
MCH RBC QN AUTO: 30 PG (ref 26–33)
MCHC RBC AUTO-ENTMCNC: 32 GM/DL (ref 32.2–35.5)
MCV RBC AUTO: 93.8 FL (ref 80–94)
MONOCYTES # BLD: 8 %
MONOCYTES ABSOLUTE: 1.2 THOU/MM3 (ref 0.4–1.3)
MYELOCYTES: 1 %
NUCLEATED RED BLOOD CELLS: 1 /100 WBC
PLATELET # BLD: 260 THOU/MM3 (ref 130–400)
PMV BLD AUTO: 10.2 FL (ref 9.4–12.4)
POIKILOCYTES: ABNORMAL
POTASSIUM SERPL-SCNC: 3.5 MEQ/L (ref 3.5–5.2)
RBC # BLD: 2.9 MILL/MM3 (ref 4.7–6.1)
SEG NEUTROPHILS: 83 %
SEGMENTED NEUTROPHILS ABSOLUTE COUNT: 12.9 THOU/MM3 (ref 1.8–7.7)
SODIUM BLD-SCNC: 138 MEQ/L (ref 135–145)
WBC # BLD: 15.5 THOU/MM3 (ref 4.8–10.8)

## 2018-08-10 PROCEDURE — 6370000000 HC RX 637 (ALT 250 FOR IP): Performed by: INTERNAL MEDICINE

## 2018-08-10 PROCEDURE — 6370000000 HC RX 637 (ALT 250 FOR IP): Performed by: PHYSICAL MEDICINE & REHABILITATION

## 2018-08-10 PROCEDURE — 6370000000 HC RX 637 (ALT 250 FOR IP): Performed by: NURSE PRACTITIONER

## 2018-08-10 PROCEDURE — 2709999900 HC NON-CHARGEABLE SUPPLY

## 2018-08-10 PROCEDURE — 97530 THERAPEUTIC ACTIVITIES: CPT

## 2018-08-10 PROCEDURE — 6370000000 HC RX 637 (ALT 250 FOR IP): Performed by: PHYSICIAN ASSISTANT

## 2018-08-10 PROCEDURE — 6360000002 HC RX W HCPCS: Performed by: NURSE PRACTITIONER

## 2018-08-10 PROCEDURE — 6360000002 HC RX W HCPCS: Performed by: INTERNAL MEDICINE

## 2018-08-10 PROCEDURE — 99233 SBSQ HOSP IP/OBS HIGH 50: CPT | Performed by: NURSE PRACTITIONER

## 2018-08-10 PROCEDURE — 97110 THERAPEUTIC EXERCISES: CPT

## 2018-08-10 PROCEDURE — 99024 POSTOP FOLLOW-UP VISIT: CPT | Performed by: NEUROLOGICAL SURGERY

## 2018-08-10 PROCEDURE — 94640 AIRWAY INHALATION TREATMENT: CPT

## 2018-08-10 PROCEDURE — 97542 WHEELCHAIR MNGMENT TRAINING: CPT

## 2018-08-10 PROCEDURE — 6360000002 HC RX W HCPCS: Performed by: NEUROLOGICAL SURGERY

## 2018-08-10 PROCEDURE — 74018 RADEX ABDOMEN 1 VIEW: CPT

## 2018-08-10 PROCEDURE — 80048 BASIC METABOLIC PNL TOTAL CA: CPT

## 2018-08-10 PROCEDURE — 36592 COLLECT BLOOD FROM PICC: CPT

## 2018-08-10 PROCEDURE — 2060000000 HC ICU INTERMEDIATE R&B

## 2018-08-10 PROCEDURE — 83605 ASSAY OF LACTIC ACID: CPT

## 2018-08-10 PROCEDURE — 85025 COMPLETE CBC W/AUTO DIFF WBC: CPT

## 2018-08-10 PROCEDURE — 82948 REAGENT STRIP/BLOOD GLUCOSE: CPT

## 2018-08-10 PROCEDURE — 83735 ASSAY OF MAGNESIUM: CPT

## 2018-08-10 PROCEDURE — 36415 COLL VENOUS BLD VENIPUNCTURE: CPT

## 2018-08-10 RX ORDER — PREGABALIN 75 MG/1
75 CAPSULE ORAL 2 TIMES DAILY
Status: DISCONTINUED | OUTPATIENT
Start: 2018-08-10 | End: 2018-08-18 | Stop reason: ALTCHOICE

## 2018-08-10 RX ORDER — INSULIN GLARGINE 100 [IU]/ML
36 INJECTION, SOLUTION SUBCUTANEOUS DAILY
Status: DISCONTINUED | OUTPATIENT
Start: 2018-08-10 | End: 2018-08-16

## 2018-08-10 RX ORDER — LIDOCAINE 50 MG/G
2 PATCH TOPICAL DAILY
Status: DISCONTINUED | OUTPATIENT
Start: 2018-08-10 | End: 2018-08-18 | Stop reason: ALTCHOICE

## 2018-08-10 RX ORDER — NEOSTIGMINE METHYLSULFATE 1 MG/ML
1 INJECTION, SOLUTION INTRAVENOUS EVERY 6 HOURS
Status: COMPLETED | OUTPATIENT
Start: 2018-08-10 | End: 2018-08-10

## 2018-08-10 RX ADMIN — IPRATROPIUM BROMIDE AND ALBUTEROL SULFATE 1 AMPULE: .5; 3 SOLUTION RESPIRATORY (INHALATION) at 13:50

## 2018-08-10 RX ADMIN — FUROSEMIDE 10 MG: 10 INJECTION, SOLUTION INTRAMUSCULAR; INTRAVENOUS at 11:01

## 2018-08-10 RX ADMIN — INSULIN LISPRO 1 UNITS: 100 INJECTION, SOLUTION INTRAVENOUS; SUBCUTANEOUS at 22:05

## 2018-08-10 RX ADMIN — IPRATROPIUM BROMIDE AND ALBUTEROL SULFATE 1 AMPULE: .5; 3 SOLUTION RESPIRATORY (INHALATION) at 22:12

## 2018-08-10 RX ADMIN — POLYETHYLENE GLYCOL 3350 17 G: 17 POWDER, FOR SOLUTION ORAL at 11:12

## 2018-08-10 RX ADMIN — Medication 1 UNITS: at 17:55

## 2018-08-10 RX ADMIN — NEOSTIGMINE METHYLSULFATE 1 MG: 1 INJECTION, SOLUTION INTRAVENOUS at 18:36

## 2018-08-10 RX ADMIN — METHYLNALTREXONE BROMIDE 20 MG: 12 INJECTION, SOLUTION SUBCUTANEOUS at 15:13

## 2018-08-10 RX ADMIN — NEOSTIGMINE METHYLSULFATE 1 MG: 1 INJECTION, SOLUTION INTRAVENOUS at 23:49

## 2018-08-10 RX ADMIN — TRAZODONE HYDROCHLORIDE 50 MG: 50 TABLET ORAL at 22:04

## 2018-08-10 RX ADMIN — BACLOFEN 10 MG: 10 TABLET ORAL at 11:00

## 2018-08-10 RX ADMIN — BACLOFEN 10 MG: 10 TABLET ORAL at 22:04

## 2018-08-10 RX ADMIN — OXYCODONE HYDROCHLORIDE AND ACETAMINOPHEN 1 TABLET: 5; 325 TABLET ORAL at 11:00

## 2018-08-10 RX ADMIN — BACLOFEN 10 MG: 10 TABLET ORAL at 13:00

## 2018-08-10 RX ADMIN — LEVOTHYROXINE SODIUM 50 MCG: 50 TABLET ORAL at 06:36

## 2018-08-10 RX ADMIN — Medication 2 UNITS: at 12:59

## 2018-08-10 RX ADMIN — BISACODYL 10 MG: 10 SUPPOSITORY RECTAL at 11:48

## 2018-08-10 RX ADMIN — IPRATROPIUM BROMIDE AND ALBUTEROL SULFATE 1 AMPULE: .5; 3 SOLUTION RESPIRATORY (INHALATION) at 17:50

## 2018-08-10 RX ADMIN — DOCUSATE SODIUM 100 MG: 100 CAPSULE, LIQUID FILLED ORAL at 22:04

## 2018-08-10 RX ADMIN — DOCUSATE SODIUM 100 MG: 100 CAPSULE, LIQUID FILLED ORAL at 11:01

## 2018-08-10 RX ADMIN — BICALUTAMIDE 50 MG: 50 TABLET, FILM COATED ORAL at 11:01

## 2018-08-10 RX ADMIN — NYSTATIN 500000 UNITS: 100000 SUSPENSION ORAL at 11:11

## 2018-08-10 RX ADMIN — DEXAMETHASONE SODIUM PHOSPHATE 3 MG: 4 INJECTION, SOLUTION INTRA-ARTICULAR; INTRALESIONAL; INTRAMUSCULAR; INTRAVENOUS; SOFT TISSUE at 05:39

## 2018-08-10 RX ADMIN — NEOSTIGMINE METHYLSULFATE 1 MG: 1 INJECTION, SOLUTION INTRAVENOUS at 02:43

## 2018-08-10 RX ADMIN — DULOXETINE HYDROCHLORIDE 60 MG: 60 CAPSULE, DELAYED RELEASE ORAL at 11:01

## 2018-08-10 RX ADMIN — PREGABALIN 75 MG: 75 CAPSULE ORAL at 22:03

## 2018-08-10 RX ADMIN — GABAPENTIN 1200 MG: 600 TABLET, FILM COATED ORAL at 11:00

## 2018-08-10 RX ADMIN — DEXAMETHASONE SODIUM PHOSPHATE 3 MG: 4 INJECTION, SOLUTION INTRA-ARTICULAR; INTRALESIONAL; INTRAMUSCULAR; INTRAVENOUS; SOFT TISSUE at 17:54

## 2018-08-10 RX ADMIN — FUROSEMIDE 10 MG: 10 INJECTION, SOLUTION INTRAMUSCULAR; INTRAVENOUS at 17:55

## 2018-08-10 RX ADMIN — SENNOSIDES 17.2 MG: 8.6 TABLET, FILM COATED ORAL at 22:04

## 2018-08-10 RX ADMIN — FUROSEMIDE 10 MG: 10 INJECTION, SOLUTION INTRAMUSCULAR; INTRAVENOUS at 00:25

## 2018-08-10 RX ADMIN — NYSTATIN 500000 UNITS: 100000 SUSPENSION ORAL at 17:54

## 2018-08-10 RX ADMIN — HYDROCODONE BITARTRATE AND ACETAMINOPHEN 1 TABLET: 10; 325 TABLET ORAL at 13:51

## 2018-08-10 RX ADMIN — INSULIN GLARGINE 36 UNITS: 100 INJECTION, SOLUTION SUBCUTANEOUS at 11:00

## 2018-08-10 RX ADMIN — NYSTATIN 500000 UNITS: 100000 SUSPENSION ORAL at 12:59

## 2018-08-10 RX ADMIN — NYSTATIN 500000 UNITS: 100000 SUSPENSION ORAL at 22:04

## 2018-08-10 RX ADMIN — PREGABALIN 75 MG: 75 CAPSULE ORAL at 12:58

## 2018-08-10 ASSESSMENT — PAIN DESCRIPTION - PAIN TYPE
TYPE: ACUTE PAIN
TYPE: SURGICAL PAIN
TYPE: ACUTE PAIN;SURGICAL PAIN
TYPE: ACUTE PAIN;SURGICAL PAIN

## 2018-08-10 ASSESSMENT — PAIN DESCRIPTION - FREQUENCY
FREQUENCY: INTERMITTENT
FREQUENCY: INTERMITTENT

## 2018-08-10 ASSESSMENT — PAIN SCALES - GENERAL
PAINLEVEL_OUTOF10: 2
PAINLEVEL_OUTOF10: 1
PAINLEVEL_OUTOF10: 5
PAINLEVEL_OUTOF10: 6
PAINLEVEL_OUTOF10: 5
PAINLEVEL_OUTOF10: 8
PAINLEVEL_OUTOF10: 4
PAINLEVEL_OUTOF10: 5
PAINLEVEL_OUTOF10: 3
PAINLEVEL_OUTOF10: 0
PAINLEVEL_OUTOF10: 8

## 2018-08-10 ASSESSMENT — PAIN DESCRIPTION - ONSET: ONSET: ON-GOING

## 2018-08-10 ASSESSMENT — PAIN DESCRIPTION - LOCATION
LOCATION: BACK;ABDOMEN
LOCATION: BACK

## 2018-08-10 ASSESSMENT — PAIN DESCRIPTION - DESCRIPTORS
DESCRIPTORS: ACHING
DESCRIPTORS: ACHING
DESCRIPTORS: ACHING;NAGGING
DESCRIPTORS: ACHING

## 2018-08-10 NOTE — PROGRESS NOTES
IM Progress Note  Dr. Brandon Demarco  8/10/2018 8:39 AM      Patient name Sharlene Monge  [id number]  PCP: Erika Meier MD  Admit Date: 8/1/2018  Acct No. [de-identified]    Subjective: Interval History:   Just back from surgery  No flatus        Diet: DIET CLEAR LIQUID;    I/O last 3 completed shifts: In: 980 [P.O.:950; I.V.:30]  Out: 66527 [Urine:64109; Emesis/NG output:150]  No intake/output data recorded. Admission weight: 279 lb (126.6 kg) as of 8/1/2018  5:04 PM  Wt Readings from Last 3 Encounters:   08/10/18 281 lb 11.2 oz (127.8 kg)     Body mass index is 39.29 kg/m².           Medications:   Scheduled Meds:   dexamethasone  3 mg Intravenous Q12H    docusate sodium  100 mg Oral BID    senna  2 tablet Oral Nightly    furosemide  10 mg Intravenous Q8H    gabapentin  1,800 mg Oral Daily    gabapentin  1,200 mg Oral BID    bisacodyl  10 mg Rectal Daily    baclofen  10 mg Oral TID    polyethylene glycol  17 g Oral Daily    insulin glargine  30 Units Subcutaneous Daily    methylnaltrexone  0.15 mg/kg Subcutaneous Q48H    insulin lispro  0-6 Units Subcutaneous TID WC    insulin lispro  0-3 Units Subcutaneous Nightly    ipratropium-albuterol  1 ampule Inhalation Q4H WA    calcium replacement protocol   Other RX Placeholder    potassium (CARDIAC) replacement protocol   Other RX Placeholder    magnesium replacement protocol   Other RX Placeholder    DULoxetine  60 mg Oral Daily    nystatin  5 mL Oral 4x Daily    bicalutamide  50 mg Oral Daily    Fluticasone Furoate-Vilanterol  1 applicator Inhalation Daily    levothyroxine  50 mcg Oral Daily     Continuous Infusions:   dextrose         Labs :     CBC:   Recent Labs      08/08/18   0535  08/09/18   0540  08/10/18   0445   WBC  13.0*  14.7*  15.5*   HGB  6.9*  8.9*  8.7*   PLT  200  248  260     BMP:    Recent Labs      08/08/18   0535  08/09/18   0540   NA  138   --    K  3.6  4.0   CL  100   --    CO2  28   --    BUN  22   --

## 2018-08-10 NOTE — PROGRESS NOTES
Safety Education & Training, Self-Care / ADL, Balance Training, Endurance Training, Strengthening    Goals:  Patient goals : go home    Short term goals  Time Frame for Short term goals: 2 weeks  Short term goal 1: Complete BUE light strengthening HEP with min RBs to increase UB endurance to A with t/fs  Short term goal 2: Pt to tolerate sitting at EOB with no greater than CGA for greater than 10 mins and 1-2 UE release for increased ease with ADL tasks  Short term goal 3: Tolerate further assessment of t/fs by OTR when appropriate  Long term goals  Time Frame for Long term goals : No LTG set d/t short ELOS

## 2018-08-10 NOTE — PROGRESS NOTES
present scattered throughout the abdomen. Hernia mesh   is evident. Lumbar intervertebral spacer devices are present. Impression:    Diffuse ileus with both the gas-filled loops of small bowel and dilated colon. Continued progress imaging suggested. Previously seen NG tube is not identified on this image. **This report has been created using voice recognition software.  It may contain minor errors which are inherent in voice recognition technology. **    Final report electronically signed by Dr. Chloé Espinosa on 8/9/2018 8:33 AM    PROCEDURE: XR ABDOMEN (KUB) (SINGLE AP VIEW)  8/8/2018   CLINICAL INFORMATION: Ileus. Recent back surgery. . Abdominal distention.   COMPARISON: 8/7/2018   TECHNIQUE: 3 supine images of the abdomen were obtained.     FINDINGS:   There is moderate gaseous distention of the colon and moderate gaseous distention of a few centrally located small bowel loops, consistent with moderate postoperative ileus. The overall appearance is slightly worsened since yesterday. . Kidneys are somewhat obscured. No definite renal or ureteral calculi are seen. There are a few phleboliths in the pelvis. Vascular clips are present in the pelvis from prior surgery. Metallic rods are present in the lower thoracic spine. Mild left basilar atelectasis/pneumonia.      Impression  1. Mild left basilar atelectasis/pneumonia. 2. Moderate postoperative ileus, slightly worse than yesterday.      **This report has been created using voice recognition software.  It may contain minor errors which are inherent in voice recognition technology. **     Final report electronically signed by Dr. Kirti Elam on 8/8/2018 1:11 PM    PROCEDURE: XR ABDOMEN (KUB) (SINGLE AP VIEW)  8/7/2018   CLINICAL INFORMATION: Constipation.   COMPARISON: No prior study.   TECHNIQUE: 3 AP supine views of the abdomen performed.      FINDINGS:   POSTOPERATIVE CHANGES:   1. Partially imaged posterior spinal fixation hardware lower thoracic spine.   2. Disc space cages at L3-4, L4-5 and L5-S1.  3. Surgical clips overlie the inferior aspect of the mid pelvis and the medial aspect of the proximal right thigh.   LINES/TUBES/MECHANICAL DEVICES: None.   BOWEL GAS PATTERN:  1.  There is mild gaseous distention of the colon and gas within several nondistended loops of small bowel which in the right clinical setting could represent an ileus. There is a small amount stool within the ascending colon.   LUNG BASES:   1. Not included within the field of imaging.   FREE AIR: None.   MASS SHADOWS: None.   PATHOLOGIC CALCIFICATIONS: None.   OSSEOUS STRUCTURES:   1. Please refer to the CT abdomen/pelvis examination report dated 8/1/2018 for findings related to the spine.   OTHER: None.      Impression  1. Mary Canavan is mild gaseous distention of the colon and gas within several nondistended loops of small bowel which in the right clinical setting could represent an ileus. There is a small amount stool within the ascending colon.      **This report has been created using voice recognition software.  It may contain minor errors which are inherent in voice recognition technology. **     Final report electronically signed by Dr. Corie Haji on 8/7/2018 9:29 AM     PROCEDURE: CT ABDOMEN PELVIS W WO CONTRAST  8/1/2018   CLINICAL INFORMATION: acute cord compression r/o mets .   COMPARISON: None.   TECHNIQUE: 5 mm axial CT images were obtained through the abdomen and pelvis before and after the administration of intravenous and oral contrast. Coronal and sagittal reconstructions were obtained.   All CT scans at this facility use dose modulation, iterative reconstruction, and/or weight-based dosing when appropriate to reduce radiation dose to as low as reasonably achievable.     FINDINGS:  Mary Canavan is basilar fibrolinear scarring left greater than right.  The noncontrast appearance of the cardiac chambers, gallbladder, pancreas and spleen are negative for active pathology with mild fatty replacement of the liver as well as pancreas noted. There is no biliary obstruction. Left and right kidney demonstrate nonobstructing punctate calculi. There is trace contrast retained from previous images, correlate with mild perinephric stranding associated with nephritis. The small bowel and colon are   negative for gross obstruction or inflammatory wall changes. A few scattered colonic diverticula changes are present. The ventral abdominal wall image 50 postsurgical mesh from hernia repair near the umbilicus.   There is contrast within the urinary bladder. Postsurgical instrumentation of the lumbar sacral spine are present with disc graft fusion at L3, L4-5, and L5-S1.   The T8 sclerotic lesion with compression deformities are again noted. Metastatic changes again are suspected. Additional sclerotic foci within several vertebral of the lower thoracic and lumbar spine persists considered with metastatic change to the   skeleton.      Impression  1. T8 vertebral pathologic appearing fracture with sclerosis suggesting metastatic changes of the skeleton with additional lesions within the lower thoracic and lumbar segments. No obvious high-grade stenosis of the central canal visualized.   Chronic degenerative changes of lumbar spine are visualized.   Postsurgical changes of the ventral abdominal wall with visualized periumbilical mesh.   Minimal colonic diverticulosis.     **This report has been created using voice recognition software. It may contain minor errors which are inherent in voice recognition technology. **     Final report electronically signed by Dr. Treva Saavedra on 8/1/2018 11:41 PM    Endoscopy Finding:  N/A    Objective:   Vitals: BP (!) 119/58   Pulse 64   Temp 97.6 °F (36.4 °C)   Resp 16   Ht 5' 11\" (1.803 m)   Wt 281 lb 11.2 oz (127.8 kg)   SpO2 97%   BMI 39.29 kg/m²     Intake/Output Summary (Last 24 hours) at 08/10/18 1721  Last data filed at 08/10/18 1323   Gross per 24 hour   Intake 570 ml   Output            29415 ml   Net           -52489 ml     Weight:  Wt Readings from Last 3 Encounters:   08/10/18 281 lb 11.2 oz (127.8 kg)     General appearance: alert and cooperative with exam.  Well groomed, well nourished  male  Lungs: clear to auscultation bilaterally  Heart: regular rate and rhythm, S1, S2 normal, no murmur, click, rub or gallop  Abdomen: taut, distended, non tender, hypoactive BS x 4 with tinkling BS in RUQ  Extremities: Flaccid lower extremities bilaterally, feet cool to touch    Assessment and Plan:   1. Severe ileus - no interval improvement in KUB or clinical status. (+) flatus but no significant BM. Continue NGT, bowel regimen, and Miralax. Repeat neostigmine- if no improvement, decompressive colonoscopy. KUB in am.  2. Intermittent nausea - NGT  3. Metastatic prostate cancer to thoracic spine with paraplegia. Neurology/Neurosurgery managing  4. Bowel regimen for paraplegia - insert dulcolax suppository daily. Perform rectal stimulation 20 minutes later to promote rectal sphincter relaxation to allow defecation. 5. Anemia - s/p PRBC per Attending. Stable. Monitor and transfuse prn.           Follow up in GI Clinic after discharge in week(s)              CINTIA Mendez - CNP  8/10/2018  5:21 PM     Will use CO2 with decompression colonoscopy     Patient is seen independently from the nurse practitioner and all  the pertinent data along with physical examination and assessment and plans are all obtained by my self and  Laboratory data, Radiology results, medications all are reviewed by my self and care is discussed extensively with the patient  and the patients nurse and all agree with plan and in addition see orders and plans    Electronically signed by Georges Baker MD on 8/10/2018 at 8:30 PM

## 2018-08-10 NOTE — PROGRESS NOTES
Nutrition Assessment    Type and Reason for Visit: Reassess (po monitor)    Nutrition Recommendations: Monitor for ability to use gut. Diet per Dr as pt able. If extended gut rest is needed & TPN started, recommend dose on 92 kg & start with 10 kcals/kg, 1.2 grams protien/kg & 20% lipid kcals. Malnutrition Assessment:  · Malnutrition Status: No malnutrition  Nutrition Diagnosis:   · Problem: Increased nutrient needs  · Etiology: related to Catabolic illness     Signs and symptoms:  as evidenced by Presence of wounds (back surgery and metastatic CA)    Nutrition Assessment:  · Subjective Assessment:Pt. admit with acute cord compression due to metastatic prostate CA; s/p 15h surgery 8/3 on tumor involving T7/T8 with fracture causing functional paraplegia. Pt seen with NG in & verbalizes no bm yet & they are going to do a colonoscopy today. Pt had been taking CL. Reminded pt when diet FL or or more plan the high protein Ensure & pt agreeable. Dr noting severe ileus. KUB noted. 8/10 glucose 206, Hgb 8.7.  meds include Relistor,   lasix, steroid, lantus, humalog & bm meds. Planning radiation at some point per wife.    · Wound Type: Surgical Wound (8/3 back surgery;blisters on chest,abdomen,eye,shoulder)  · Current Nutrition Therapies:  · Oral Diet Orders: Clear Liquid   · Oral Diet intake:  (pt taking, but does have NG in also)  · Oral Nutrition Supplement (ONS) Orders: Standard High Calorie Oral Supplement (Ensure High Protein TID and activia TID once on FL or more)  · Anthropometric Measures:  · Ht: 5' 11\" (180.3 cm)   · Current Body Wt: 281 lb 12 oz (127.8 kg) (8/10 + 2 edema)  · Admission Body Wt: 279 lb (126.6 kg) (8/1)  · Usual Body Wt:  (280-284# per pt. and wife )  · Ideal Body Wt: 172 lb (78 kg), % Ideal Body 171%  · Adjusted Body Wt: 203 lb (92.1 kg), body weight adjusted for Obesity  · BMI Classification: BMI > or equal to 40.0 Obese Class III (39.4)  · Comparative Standards (Estimated Nutrition

## 2018-08-10 NOTE — CARE COORDINATION
8/10/18, 10:20 AM      Anh Judi day: 9  Location: --A Reason for admit: Cord compression [facility]) [G95.20]   Procedure: KUB    Impression:       Diffuse ileus with both the gas-filled loops of small bowel and dilated colon. Continued progress imaging suggested. Previously seen NG tube is not identified on this image. Treatment Plan of Care: Continue PT/OT, Miralax, dulcolax and rectal stim. Consider decompressive colonoscopy if no improvement.  Repeat KUB in AM.  PCP: Belén Carrasco MD  Readmission Risk Score: 24%  Discharge Plan: IP Rehab vs ECF

## 2018-08-10 NOTE — PROGRESS NOTES
INSERTION OF T8 WITH POSTERIOR INSTRUMENTATION OF THORACIC SPINE AND TUMOR REMOVAL performed by Bipin Aguirre MD at [address] [place] [address]         Restrictions/Precautions:  Fall Risk            Spinal Precautions: No Bending, No Lifting, No Twisting  Other position/activity restrictions: CHANDRIKA drain, 8/9 NG tube, paraplegia       Prior Level of Function:  ADL Assistance: Independent  Homemaking Assistance: Independent  Ambulation Assistance: Independent  Transfer Assistance: Independent  Additional Comments: Pt reports he was ambulating without AD PLOF. Pt reports pain, numbness, & unsteadiness with mobility at home since April 2018.      Subjective:     Subjective: pt in bed on arrival and agreeable and motivated nursing ok;d to get out of bed and remove from suction to leave the room, pt cont to c/o of left buttock and down LE pain dicussed with CNP during session,pt cont to be on 2L O2 noted with activity sats 91%     Pain:   .  Pain Assessment  Pain Level: 8 (more so at left buttocks and down LE more so than back )       Social/Functional:  Lives With: Family (wife & 2 grandchildren (15 & 12 yo))  Type of Home: House  Home Layout: One level  Home Access: Stairs to enter with rails (3)  Home Equipment: Cane     Objective:  Rolling to Right: Maximum assistance (adn to the left, pt was depenent to bring LEs into hooklying completed multi rolls and rolling to place maxi move sling )    Transfers  Bed to Chair: Dependent/Total (with use of maxi move and once up in chair discussed pressure relief and he completed 5 reps at each side)        Wheelchair Activities  Propulsion: Yes  Propulsion 1  Propulsion: Manual  Method: RUE;LUE  Level of Assistance: Stand by assistance  Description/ Details: pt did fatiuge and needed rest break 1/2 through he denied pain from pushing just UE fatigue he required cues for turning around           Exercises:  Exercises  Comments: completed ex and stretches renae LEs ankle df/pf no

## 2018-08-10 NOTE — PLAN OF CARE
Problem: Falls - Risk of:  Goal: Will remain free from falls  Will remain free from falls   Outcome: Ongoing  No falls this shift. Patient remains on falling star program with fall band on and falling star on door. Bed exit alarm on. Problem: Pain:  Goal: Pain level will decrease  Pain level will decrease   Outcome: Ongoing  Patient received PRN pain medication for left leg pain. Patient states improvement in pain after medication given. Problem: Risk for Impaired Skin Integrity  Goal: Tissue integrity - skin and mucous membranes  Structural intactness and normal physiological function of skin and  mucous membranes. Outcome: Ongoing  No new skin breakdown noted to bony prominences or coccyx area. Patient is turned every 2 hours and prn with back care given at this time. Problem: Discharge Planning:  Goal: Discharged to appropriate level of care  Discharged to appropriate level of care   Outcome: Ongoing  Patient continuous to require telemetry monitoring. Patient on neuroscience floor to monitor neurological status. Problem: Urinary Elimination:  Goal: Signs and symptoms of infection will decrease  Signs and symptoms of infection will decrease   Outcome: Ongoing  No s/s at catheter site. Problem: Infection - Central Venous Catheter-Associated Bloodstream Infection:  Goal: Will show no infection signs and symptoms  Will show no infection signs and symptoms    Outcome: Ongoing  No s/s of infection at CVC site. Problem: Nutrition  Goal: Optimal nutrition therapy  Outcome: Ongoing  Patient remains on clear liquid diet. NG placed to intermittent suction. Clamped for 1 hour after eating. Problem: Skin Integrity:  Goal: Will show no infection signs and symptoms  Will show no infection signs and symptoms    Outcome: Ongoing  No s/s of infection at CVC site.      Problem: Neurological  Goal: Maximum potential motor/sensory/cognitive function  Outcome: Ongoing  Patient states improvement in sensation in LLE. RLE remains to have no sensation from the hip down, unable to move RLE. Patient states at times he is able to slightly move his LLE. Problem: DISCHARGE BARRIERS  Goal: Patient's continuum of care needs are met  Outcome: Ongoing  Patient able to use call light to help insure that his continuum of care needs are met. Comments: Care plan reviewed with patient. Patient verbalize understanding of the plan of care and contribute to goal setting.

## 2018-08-10 NOTE — PLAN OF CARE
Problem: Falls - Risk of:  Goal: Will remain free from falls  Will remain free from falls   Outcome: Ongoing  Call light in reach, bed in lowest position, and bed alarm activated. Education given on use of call light before ambulation and when in need of assistance. Patient expressed understanding. Hourly visual checks performed and charted. Toileting offered to patient. No falls this shift, at any time. Arm band and falling star in place. Will continue to monitor. Goal: Absence of physical injury  Absence of physical injury   Outcome: Ongoing  Patient remains free from injury this shift. Problem: Pain:  Goal: Pain level will decrease  Pain level will decrease   Outcome: Ongoing  Patient has reported acute pain. Patient has PRN medications ordered. Goal: Control of acute pain  Control of acute pain   Outcome: Ongoing  Patient has reported acute pain this shift. PRN Percocet controls patient's pain at this time. Goal: Control of chronic pain  Control of chronic pain   Outcome: Ongoing  Patient has not reported any chronic pain this shift. Problem: Risk for Impaired Skin Integrity  Goal: Tissue integrity - skin and mucous membranes  Structural intactness and normal physiological function of skin and  mucous membranes. Outcome: Ongoing  No signs of skin breakdown. Skin warm, dry, and intact. Mucous membranes pink and moist.  Assistance with turns/ambulation provided PRN. Patient's surgery incision is clean, dry and intact. Patient's wounds remain intact with dressings in place. Will continue to monitor. Problem: Discharge Planning:  Goal: Discharged to appropriate level of care  Discharged to appropriate level of care   Outcome: Ongoing  Patient's plan is unsure at this time. Patient plans to be discharged to a rehab facility when medically stable. Discharge planning in process and discussed with patient and patient's family. Social work consulted for any additional needs.  Care manager aware of discharge needs. Problem: Cardiac Output - Decreased:  Goal: Hemodynamic stability will improve  Hemodynamic stability will improve   Outcome: Ongoing     08/10/18 1148   Vital Signs   Temp 97.8 °F (36.6 °C)   Temp Source Oral   Pulse 87   Heart Rate Source Monitor   Resp 16   /64   BP Location Right Arm   BP Upper/Lower Lower   MAP (mmHg) 88   Oxygen Therapy   SpO2 97 %   O2 Device Nasal cannula       Patient's vital signs remain stable. Patient has palpable pulses in all 4 extremities. Problem: Pain:  Goal: Control of acute pain  Control of acute pain   Outcome: Ongoing  Patient has reported acute pain this shift. Patient has PRN percocet ordered that is controlling patient's pain well. Problem: Urinary Elimination:  Goal: Signs and symptoms of infection will decrease  Signs and symptoms of infection will decrease   Outcome: Ongoing  Patient remains afebrile and does not show any other sign or symptom of infection at this time. Khan catheter remains in place. Problem: Infection - Central Venous Catheter-Associated Bloodstream Infection:  Goal: Will show no infection signs and symptoms  Will show no infection signs and symptoms    Outcome: Ongoing  Patient remains afebrile and shows no other sign or symptom of infection at this time. Catheter site is appropriate color, cool, and not painful. Problem: Nutrition  Goal: Optimal nutrition therapy  Outcome: Ongoing  Patient remains on clear liquid diet and consuming adequate amounts of all meals. Problem: Skin Integrity:  Goal: Absence of new skin breakdown  Absence of new skin breakdown   Outcome: Ongoing  Patient does not show any sign of new skin breakdown this shift. Goal: Will show no infection signs and symptoms  Will show no infection signs and symptoms    Outcome: Ongoing  Patient remains afebrile and shows no other sign or symptom of infection at this time. Catheter site is appropriate color, cool, and not painful. Problem: Neurological  Goal: Maximum potential motor/sensory/cognitive function  Outcome: Ongoing  Patient remains on bedrest this shift. Patient able to get to the wheelchair with therapy. Patient reports numbness and tingling in the right lower extremity and decreased sensation in the left lower extremity. Patient is alert and oriented x4. Patient is able to appropriately follow commands. Problem: DISCHARGE BARRIERS  Goal: Patient's continuum of care needs are met  Outcome: Ongoing  Patient is planning to be discharged to another facility for further rehab when medically stable. Comments: Care plan reviewed with patient and patient's wife. Both verbalize understanding of the plan of care and contribute to goal setting.

## 2018-08-11 ENCOUNTER — APPOINTMENT (OUTPATIENT)
Dept: GENERAL RADIOLOGY | Age: 63
DRG: 518 | End: 2018-08-11
Payer: MEDICARE

## 2018-08-11 LAB
BASOPHILIA: ABNORMAL
BASOPHILS # BLD: 0 %
BASOPHILS ABSOLUTE: 0 THOU/MM3 (ref 0–0.1)
EOSINOPHIL # BLD: 0 %
EOSINOPHILS ABSOLUTE: 0 THOU/MM3 (ref 0–0.4)
ERYTHROCYTE [DISTWIDTH] IN BLOOD BY AUTOMATED COUNT: 15.4 % (ref 11.5–14.5)
ERYTHROCYTE [DISTWIDTH] IN BLOOD BY AUTOMATED COUNT: 50.7 FL (ref 35–45)
GLUCOSE BLD-MCNC: 140 MG/DL (ref 70–108)
GLUCOSE BLD-MCNC: 151 MG/DL (ref 70–108)
GLUCOSE BLD-MCNC: 158 MG/DL (ref 70–108)
GLUCOSE BLD-MCNC: 211 MG/DL (ref 70–108)
HCT VFR BLD CALC: 28.8 % (ref 42–52)
HEMOGLOBIN: 9 GM/DL (ref 14–18)
LACTIC ACID: 1.9 MMOL/L (ref 0.5–2.2)
LYMPHOCYTES # BLD: 8 %
LYMPHOCYTES ABSOLUTE: 1.2 THOU/MM3 (ref 1–4.8)
MAGNESIUM: 2.2 MG/DL (ref 1.6–2.4)
MCH RBC QN AUTO: 29.8 PG (ref 26–33)
MCHC RBC AUTO-ENTMCNC: 31.3 GM/DL (ref 32.2–35.5)
MCV RBC AUTO: 95.4 FL (ref 80–94)
MONOCYTES # BLD: 9 %
MONOCYTES ABSOLUTE: 1.3 THOU/MM3 (ref 0.4–1.3)
NUCLEATED RED BLOOD CELLS: 0 /100 WBC
PLATELET # BLD: 280 THOU/MM3 (ref 130–400)
PLATELET ESTIMATE: ADEQUATE
PMV BLD AUTO: 10.3 FL (ref 9.4–12.4)
POIKILOCYTES: ABNORMAL
POTASSIUM SERPL-SCNC: 3.8 MEQ/L (ref 3.5–5.2)
RBC # BLD: 3.02 MILL/MM3 (ref 4.7–6.1)
ROULEAUX: PRESENT
SEG NEUTROPHILS: 83 %
SEGMENTED NEUTROPHILS ABSOLUTE COUNT: 12 THOU/MM3 (ref 1.8–7.7)
WBC # BLD: 14.5 THOU/MM3 (ref 4.8–10.8)

## 2018-08-11 PROCEDURE — 0D9E8ZZ DRAINAGE OF LARGE INTESTINE, VIA NATURAL OR ARTIFICIAL OPENING ENDOSCOPIC: ICD-10-PCS | Performed by: INTERNAL MEDICINE

## 2018-08-11 PROCEDURE — 85025 COMPLETE CBC W/AUTO DIFF WBC: CPT

## 2018-08-11 PROCEDURE — 6370000000 HC RX 637 (ALT 250 FOR IP): Performed by: PHYSICAL MEDICINE & REHABILITATION

## 2018-08-11 PROCEDURE — 6370000000 HC RX 637 (ALT 250 FOR IP): Performed by: INTERNAL MEDICINE

## 2018-08-11 PROCEDURE — 6360000002 HC RX W HCPCS: Performed by: INTERNAL MEDICINE

## 2018-08-11 PROCEDURE — 74018 RADEX ABDOMEN 1 VIEW: CPT

## 2018-08-11 PROCEDURE — 6370000000 HC RX 637 (ALT 250 FOR IP): Performed by: NURSE PRACTITIONER

## 2018-08-11 PROCEDURE — 2060000000 HC ICU INTERMEDIATE R&B

## 2018-08-11 PROCEDURE — 6370000000 HC RX 637 (ALT 250 FOR IP): Performed by: PHYSICIAN ASSISTANT

## 2018-08-11 PROCEDURE — 2709999900 HC NON-CHARGEABLE SUPPLY

## 2018-08-11 PROCEDURE — 84132 ASSAY OF SERUM POTASSIUM: CPT

## 2018-08-11 PROCEDURE — 83605 ASSAY OF LACTIC ACID: CPT

## 2018-08-11 PROCEDURE — 99152 MOD SED SAME PHYS/QHP 5/>YRS: CPT | Performed by: INTERNAL MEDICINE

## 2018-08-11 PROCEDURE — 6360000002 HC RX W HCPCS: Performed by: NEUROLOGICAL SURGERY

## 2018-08-11 PROCEDURE — 94640 AIRWAY INHALATION TREATMENT: CPT

## 2018-08-11 PROCEDURE — 36592 COLLECT BLOOD FROM PICC: CPT

## 2018-08-11 PROCEDURE — 36415 COLL VENOUS BLD VENIPUNCTURE: CPT

## 2018-08-11 PROCEDURE — 99024 POSTOP FOLLOW-UP VISIT: CPT | Performed by: NEUROLOGICAL SURGERY

## 2018-08-11 PROCEDURE — 97535 SELF CARE MNGMENT TRAINING: CPT

## 2018-08-11 PROCEDURE — 82948 REAGENT STRIP/BLOOD GLUCOSE: CPT

## 2018-08-11 PROCEDURE — 3609027000 HC COLONOSCOPY: Performed by: INTERNAL MEDICINE

## 2018-08-11 PROCEDURE — 97110 THERAPEUTIC EXERCISES: CPT

## 2018-08-11 PROCEDURE — 83735 ASSAY OF MAGNESIUM: CPT

## 2018-08-11 PROCEDURE — 99153 MOD SED SAME PHYS/QHP EA: CPT | Performed by: INTERNAL MEDICINE

## 2018-08-11 RX ORDER — MIDAZOLAM HYDROCHLORIDE 1 MG/ML
INJECTION INTRAMUSCULAR; INTRAVENOUS PRN
Status: DISCONTINUED | OUTPATIENT
Start: 2018-08-11 | End: 2018-08-11 | Stop reason: HOSPADM

## 2018-08-11 RX ORDER — FENTANYL CITRATE 50 UG/ML
INJECTION, SOLUTION INTRAMUSCULAR; INTRAVENOUS PRN
Status: DISCONTINUED | OUTPATIENT
Start: 2018-08-11 | End: 2018-08-11 | Stop reason: HOSPADM

## 2018-08-11 RX ORDER — DEXAMETHASONE SODIUM PHOSPHATE 4 MG/ML
2 INJECTION, SOLUTION INTRA-ARTICULAR; INTRALESIONAL; INTRAMUSCULAR; INTRAVENOUS; SOFT TISSUE EVERY 12 HOURS
Status: DISCONTINUED | OUTPATIENT
Start: 2018-08-11 | End: 2018-08-13

## 2018-08-11 RX ADMIN — NYSTATIN 500000 UNITS: 100000 SUSPENSION ORAL at 18:36

## 2018-08-11 RX ADMIN — IPRATROPIUM BROMIDE AND ALBUTEROL SULFATE 1 AMPULE: .5; 3 SOLUTION RESPIRATORY (INHALATION) at 13:50

## 2018-08-11 RX ADMIN — INSULIN LISPRO 2 UNITS: 100 INJECTION, SOLUTION INTRAVENOUS; SUBCUTANEOUS at 12:56

## 2018-08-11 RX ADMIN — NYSTATIN 500000 UNITS: 100000 SUSPENSION ORAL at 20:09

## 2018-08-11 RX ADMIN — FUROSEMIDE 10 MG: 10 INJECTION, SOLUTION INTRAMUSCULAR; INTRAVENOUS at 09:48

## 2018-08-11 RX ADMIN — PREGABALIN 75 MG: 75 CAPSULE ORAL at 09:45

## 2018-08-11 RX ADMIN — NYSTATIN 500000 UNITS: 100000 SUSPENSION ORAL at 09:46

## 2018-08-11 RX ADMIN — LEVOTHYROXINE SODIUM 50 MCG: 50 TABLET ORAL at 07:10

## 2018-08-11 RX ADMIN — IPRATROPIUM BROMIDE AND ALBUTEROL SULFATE 1 AMPULE: .5; 3 SOLUTION RESPIRATORY (INHALATION) at 17:02

## 2018-08-11 RX ADMIN — DOCUSATE SODIUM 100 MG: 100 CAPSULE, LIQUID FILLED ORAL at 20:09

## 2018-08-11 RX ADMIN — BACLOFEN 10 MG: 10 TABLET ORAL at 20:09

## 2018-08-11 RX ADMIN — SENNOSIDES 17.2 MG: 8.6 TABLET, FILM COATED ORAL at 20:09

## 2018-08-11 RX ADMIN — POLYETHYLENE GLYCOL 3350 17 G: 17 POWDER, FOR SOLUTION ORAL at 13:02

## 2018-08-11 RX ADMIN — FUROSEMIDE 10 MG: 10 INJECTION, SOLUTION INTRAMUSCULAR; INTRAVENOUS at 17:20

## 2018-08-11 RX ADMIN — BACLOFEN 10 MG: 10 TABLET ORAL at 09:48

## 2018-08-11 RX ADMIN — INSULIN GLARGINE 36 UNITS: 100 INJECTION, SOLUTION SUBCUTANEOUS at 12:55

## 2018-08-11 RX ADMIN — HYDROCODONE BITARTRATE AND ACETAMINOPHEN 1 TABLET: 10; 325 TABLET ORAL at 23:31

## 2018-08-11 RX ADMIN — BICALUTAMIDE 50 MG: 50 TABLET, FILM COATED ORAL at 09:47

## 2018-08-11 RX ADMIN — DEXAMETHASONE SODIUM PHOSPHATE 3 MG: 4 INJECTION, SOLUTION INTRA-ARTICULAR; INTRALESIONAL; INTRAMUSCULAR; INTRAVENOUS; SOFT TISSUE at 06:02

## 2018-08-11 RX ADMIN — FUROSEMIDE 10 MG: 10 INJECTION, SOLUTION INTRAMUSCULAR; INTRAVENOUS at 01:31

## 2018-08-11 RX ADMIN — TRAZODONE HYDROCHLORIDE 50 MG: 50 TABLET ORAL at 22:03

## 2018-08-11 RX ADMIN — INSULIN LISPRO 2 UNITS: 100 INJECTION, SOLUTION INTRAVENOUS; SUBCUTANEOUS at 20:09

## 2018-08-11 RX ADMIN — PREGABALIN 75 MG: 75 CAPSULE ORAL at 20:09

## 2018-08-11 RX ADMIN — NYSTATIN 500000 UNITS: 100000 SUSPENSION ORAL at 15:53

## 2018-08-11 RX ADMIN — DULOXETINE HYDROCHLORIDE 60 MG: 60 CAPSULE, DELAYED RELEASE ORAL at 09:47

## 2018-08-11 RX ADMIN — IPRATROPIUM BROMIDE AND ALBUTEROL SULFATE 1 AMPULE: .5; 3 SOLUTION RESPIRATORY (INHALATION) at 20:34

## 2018-08-11 RX ADMIN — HYDROCODONE BITARTRATE AND ACETAMINOPHEN 1 TABLET: 10; 325 TABLET ORAL at 00:39

## 2018-08-11 RX ADMIN — IPRATROPIUM BROMIDE AND ALBUTEROL SULFATE 1 AMPULE: .5; 3 SOLUTION RESPIRATORY (INHALATION) at 09:37

## 2018-08-11 RX ADMIN — BACLOFEN 10 MG: 10 TABLET ORAL at 15:53

## 2018-08-11 RX ADMIN — DEXAMETHASONE SODIUM PHOSPHATE 2 MG: 4 INJECTION, SOLUTION INTRA-ARTICULAR; INTRALESIONAL; INTRAMUSCULAR; INTRAVENOUS; SOFT TISSUE at 17:11

## 2018-08-11 RX ADMIN — INSULIN LISPRO 2 UNITS: 100 INJECTION, SOLUTION INTRAVENOUS; SUBCUTANEOUS at 18:36

## 2018-08-11 ASSESSMENT — PAIN DESCRIPTION - ONSET: ONSET: ON-GOING

## 2018-08-11 ASSESSMENT — PAIN SCALES - GENERAL
PAINLEVEL_OUTOF10: 3
PAINLEVEL_OUTOF10: 4
PAINLEVEL_OUTOF10: 3
PAINLEVEL_OUTOF10: 6
PAINLEVEL_OUTOF10: 6
PAINLEVEL_OUTOF10: 0
PAINLEVEL_OUTOF10: 0
PAINLEVEL_OUTOF10: 3
PAINLEVEL_OUTOF10: 9
PAINLEVEL_OUTOF10: 3
PAINLEVEL_OUTOF10: 7

## 2018-08-11 ASSESSMENT — PAIN DESCRIPTION - LOCATION: LOCATION: BACK

## 2018-08-11 ASSESSMENT — PAIN - FUNCTIONAL ASSESSMENT: PAIN_FUNCTIONAL_ASSESSMENT: 0-10

## 2018-08-11 ASSESSMENT — PAIN DESCRIPTION - PAIN TYPE
TYPE: SURGICAL PAIN
TYPE: SURGICAL PAIN

## 2018-08-11 ASSESSMENT — PAIN DESCRIPTION - FREQUENCY: FREQUENCY: INTERMITTENT

## 2018-08-11 ASSESSMENT — PAIN DESCRIPTION - DESCRIPTORS: DESCRIPTORS: ACHING

## 2018-08-11 ASSESSMENT — PAIN DESCRIPTION - PROGRESSION: CLINICAL_PROGRESSION: NOT CHANGED

## 2018-08-11 NOTE — PROGRESS NOTES
[facility]    Covering for Dr. Maria Luisa Jimenez 4A       Patient: Horacio Butler  Unit/Bed: 4A-11/011-A  YOB: 1955  MRN: [id number]  Acct: [de-identified]   Admitting Diagnosis: Cord compression [facility]) [G95.20]  Admit Date:  8/1/2018  Hospital Day: 10    Interval History:    Patient is on admission with a working diagnosis of cord compression and currently status post decompression and spine fixation. Procedure was well tolerated but complicated with post-op ileus. The following Services are consulting: Neurosurgery, GI     Chart, Labs, Radiology studies, and Consults reviewed. Subjective:  Patient seen and examined   There are no new complaints today. Objective:   /66   Pulse 70   Temp 97.7 °F (36.5 °C) (Oral)   Resp 18   Ht 5' 11\" (1.803 m)   Wt 281 lb 11.2 oz (127.8 kg)   SpO2 94%   BMI 39.29 kg/m²     Intake/Output Summary (Last 24 hours) at 08/11/18 0954  Last data filed at 08/11/18 0529   Gross per 24 hour   Intake             1180 ml   Output             3650 ml   Net            -2470 ml        General Appearance:  Well developed, middle-aged male, in no apparent distress. Skin:  Skin has good color, good turgor, no rashes, no acute lesions  Lungs: CTA bilaterally, no wheeze, rales or rhonchi, good air entry, good respiratory effort  Heart: Regular rate and rhythm, S1 and S2 only without murmur, gallop or rub. Abdomen:  Soft, non-tender, tinkling bowel sounds. No bruits, organomegaly or masses. Extremities: Extremities warm to touch, pink, with no edema. and Upper:No clubbing bilateral hands, no cyanosis or edema. Lower Extrem:no cyanosis or edema, no calf tenderness to lower extremities  Neurologic: Awake, alert and oriented.  No focal deficits  Affect: Neutral/Euthymic(normal)    Khan:  Drains:  Central Line/port:   EKG:  Imaging:    Diet:  DIET CLEAR LIQUID;    DVT Prophylaxis:    Data:  CBC: Recent Labs      08/09/18 0540  08/10/18   0445  08/11/18   0550   WBC  14.7*  15.5*  14.5*   RBC  2.93*  2.90*  3.02*   HGB  8.9*  8.7*  9.0*   HCT  27.3*  27.2*  28.8*   MCV  93.2  93.8  95.4*   PLT  248  260  280     BMP: Recent Labs      08/09/18   0540  08/10/18   1736  08/11/18   0550   NA   --   138   --    K  4.0  3.5  3.8   CL   --   96*   --    CO2   --   30   --    BUN   --   18   --    CREATININE   --   0.5   --      BNP: No results for input(s): BNP in the last 72 hours. PT/INR: No results for input(s): PROTIME, INR in the last 72 hours. APTT: No results for input(s): APTT in the last 72 hours. CARDIAC ENZYMES: No results for input(s): CKMB, CKMBINDEX, TROPONINT in the last 72 hours. Invalid input(s): CKTOTAL;3      Assessment/Plan:  Active Problems:    Cord compression (HCC)    Acute myelopathy (HCC)    Prostate cancer, primary, with metastasis from prostate to other site [facility])    Respiratory failure requiring intubation ([facility] [address]    Status post laminectomy with spinal fusion  Resolved Problems:    * No resolved hospital problems. *      Active Issues    PLAN    1. S/P Spinal Surgery  - bilateral pedicle screws in T5, T6, T7, T10 and T11 + posterior decompression at T8-T9  - continue post-op mgt per Neurosurgery    2. Post-op Ileus  - KUB reviewed  - GI evaluation and recommendations acknowledged with thanks  - currently on Neostigmine and NGT decompression    3. Metastatic Prostate Cancer  - w/ spinal lesions causing paraplegia  - PM&R following  - improved with therapy plus Baclofen    4.  DM  - BG range; 144 - 225  - on Glargine 36 Units and low dose coverage  - pt is also on Dexamethasone for cord compression  - will adjust coverage for better control    Electronically signed by Michael Brunner MD on 8/11/2018 at 9:54 AM    For: Attending Physician

## 2018-08-11 NOTE — PROGRESS NOTES
Mad[address] Mays 60  INPATIENT OCCUPATIONAL THERAPY  [facility] NEUROSCIENCES 4A  DAILY NOTE    Time:  Time In: 729  Time Out: 9832  Timed Code Treatment Minutes: 15 Minutes  Minutes: 30      X co-tx completed with physical therapy this date d/t pt requiring 2 skilled hands for safety. Date: 2018  Patient Name: Horacio Butler,   Gender: male      [facility]  MRN: [id number]  : 1955  (61 y.o.)  Referring Practitioner: Dr. Thomas Zamora  Diagnosis: cord compression  Additional Pertinent Hx: Per ER note on 18:  61 y.o. male who presents to Emergency Department with gradually worsening leg weakness and lumbar back pain. Patient has history of prostates cancer diagnosis in  with a prostatectomy performed at [facility] and proceeded to receive radiation. The patient presented to [facility] ED for gradually worsening lumbar back pain and intermittent bilateral leg weakness where he was treated with Valium and morphine. Patient was discharged the same day and consulted with his PCP. His PCP became concerned his symptoms were metastatic and ordered a bone scan on 18 where patient had presence of T7 metastasis. s/p: S/P  bilateral pedicle screws in T5, T6, T7, T10 and T11 + posterior decompression at T8-T9 on 8/3/18 Per Onocology note: He had findings of metastasis of the C7 vertebra, multiple thoracic vertebra and lumbar vertebra metastasis.      Past Medical History:   Diagnosis Date    Anxiety     Arthritis     Asthma     Cancer [facility] [address]     prostate    COPD (chronic obstructive pulmonary disease) ([facility] [address]     Depression     Diabetes mellitus (HCC)     GERD (gastroesophageal reflux disease)     Neuropathy     Pneumonia      Past Surgical History:   Procedure Laterality Date    CARPAL TUNNEL RELEASE Bilateral     CERVICAL DISCECTOMY      COLONOSCOPY      ENDOSCOPY, COLON, DIAGNOSTIC      EYE SURGERY      HERNIA REPAIR      LUMBAR SPINE SURGERY      NASAL SEPTUM SURGERY  OK OFFICE/OUTPT VISIT,PROCEDURE ONLY N/A 8/2/2018    TRANSPEDICULAR VERTEBRECTOMY AND CAGE INSERTION OF T8 WITH POSTERIOR INSTRUMENTATION OF THORACIC SPINE AND TUMOR REMOVAL performed by Rolando Arriola MD at [address] [place] [address]         Restrictions/Precautions:  Fall Risk                    Spinal Precautions: No Bending, No Lifting, No Twisting  Other position/activity restrictions: CHANDRIKA drain, 8/9 NG tube, paraplegia       Prior Level of Function:  ADL Assistance: Independent  Homemaking Assistance: Independent  Ambulation Assistance: Independent  Transfer Assistance: Independent  Additional Comments: Pt reports he was ambulating without AD PLOF. Pt reports pain, numbness, & unsteadiness with mobility at home since April 2018. Subjective       Subjective: RN okayed session. pt in bed and agreeable to OT and PT, co tx completed this date d/t pt requiring 2 skilled hands and increased assistance for safety. pt pleasant              Pain:   denies       Objective  Overall Cognitive Status: WNL                     ADL  Grooming: Contact guard assistance; Increased time to complete;Verbal cueing (seated at eOB with brief 2 UE release mod A needed briefly when more fatigued, 1 UE support required for majority of task )  LE Dressing: Dependent/Total          Bed mobility  Supine to Sit: Maximum assistance (max A X1 and mod A X 1 able to assist with UE )  Sit to Supine: Maximum assistance (Max A X 2 with increased fatigue)  Scooting: Moderate assistance            Balance  Sitting Balance:  (fluctuating assistance needed for EOB sitting > 20 minutes this date, mod A needed for 2 hand release briefly, CGA for majority, brief min A when more fatigued with 1 UE release tasks )                                        Exercises:        Comment: Pt tolerated BUE shoulder shrugs this date while seated at EOB X 10 reps rest break needed with CGA for balance.                   Activity Tolerance:   Good

## 2018-08-11 NOTE — PROGRESS NOTES
Physical Therapy   [address] [address]  INPATIENT PHYSICAL THERAPY  DAILY NOTE  [facility] 4A - 4A-11/011-A    Time In: 0730  Time Out: 0800  Timed Code Treatment Minutes: 15 Minutes  Minutes: 30   Co-Treat with OT d/t pt's requirement of skilled two assist for safety. Date: 2018  Patient Name: Lady Jj,  Gender:  male        MRN: [id number]  : 1955  (61 y.o.)     Referring Practitioner: Dr. Francisco Cooley  Diagnosis: cord compression  Additional Pertinent Hx:  Per ER note on 18:  61 y.o. male who presents to Emergency Department with gradually worsening leg weakness and lumbar back pain. Patient has history of prostates cancer diagnosis in  with a prostatectomy performed at [facility] and proceeded to receive radiation. The patient presented to [facility] ED for gradually worsening lumbar back pain and intermittent bilateral leg weakness where he was treated with Valium and morphine. Patient was discharged the same day and consulted with his PCP. His PCP became concerned his symptoms were metastatic and ordered a bone scan on 18 where patient had presence of T7 metastasis. s/p: S/P  bilateral pedicle screws in T5, T6, T7, T10 and T11 + posterior decompression at T8-T9 on 8/3/18 Per Onocology note: He had findings of metastasis of the C7 vertebra, multiple thoracic vertebra and lumbar vertebra metastasis.       Past Medical History:   Diagnosis Date    Anxiety     Arthritis     Asthma     Cancer ([facility] [address]     prostate    COPD (chronic obstructive pulmonary disease) ([facility] [address]     Depression     Diabetes mellitus (HCC)     GERD (gastroesophageal reflux disease)     Neuropathy     Pneumonia      Past Surgical History:   Procedure Laterality Date    CARPAL TUNNEL RELEASE Bilateral     CERVICAL DISCECTOMY      COLONOSCOPY      ENDOSCOPY, COLON, DIAGNOSTIC      EYE SURGERY      HERNIA REPAIR      LUMBAR SPINE SURGERY      NASAL SEPTUM SURGERY      LA support. Slight Lean fwd and to the left noted when pt became fatigued. Performed static and dynamic sitting EOB activities. tolerated OSVALDO UE use for hygiene activity and completed LE AAROM/PROM activities. Verbal cueing for proper upright posture and engaging abdominal mms for added support. Exercises:  Exercises  Comments: Completed EOB AAROM/PROM Osvaldo LE x10 reps with 5 sec holds; ankle df/pf with active mm contractions/ some involuntary mvmts, knee extensions/ slight stretching of HS mm, increased tightness of L > R LE noted, no quad mm contraction noted with exercise. Activity Tolerance:  Activity Tolerance: Patient Tolerated treatment well;Patient limited by fatigue;Patient limited by endurance    Assessment: Body structures, Functions, Activity limitations: Decreased functional mobility , Decreased balance, Decreased endurance, Decreased ROM, Decreased strength, Decreased coordination, Decreased sensation  Assessment: Pt tolerated session fairly well, with increased sitting EOB tolerance and decreased assist needed. Pt would benefit from continued skilled PT interventions for improved strength, endurance, balance, and functional mobility. Prognosis: Good  REQUIRES PT FOLLOW UP: Yes  Discharge Recommendations: Continue to assess pending progress, Patient would benefit from continued therapy after discharge    Patient Education:  Patient Education: bed mobility, balance    Equipment Recommendations: Other: cont to assess needs    Safety:  Type of devices:  All fall risk precautions in place, Bed alarm in place, Call light within reach, Patient at risk for falls, Left in bed, Nurse notified    Plan:  Times per week: 5-6X N/O  Times per day: Daily  Specific instructions for Next Treatment: therex, bed mobility, sitting balance, PT to assess transfers  Current Treatment Recommendations: Strengthening, ROM, Functional Mobility Training, Balance Training, Endurance Training, Home Exercise Program,

## 2018-08-11 NOTE — PRE SEDATION
Halina Telles MD    DULoxetine (CYMBALTA) extended release capsule 60 mg, 60 mg, Oral, Daily, Halina Telles MD, 60 mg at 08/11/18 0947    ondansetron (ZOFRAN) injection 4 mg, 4 mg, Intravenous, Q6H PRN, Halina Telles MD, 4 mg at 08/07/18 1006    glucose (GLUTOSE) 40 % oral gel 15 g, 15 g, Oral, PRN, Bernie Kelly MD    dextrose 50 % solution 12.5 g, 12.5 g, Intravenous, PRN, Bernie Kelly MD    glucagon (rDNA) injection 1 mg, 1 mg, Intramuscular, PRN, Bernie Kelly MD    dextrose 5 % solution, 100 mL/hr, Intravenous, PRN, Bernie Kelly MD    nystatin (MYCOSTATIN) 729081 UNIT/ML suspension 500,000 Units, 5 mL, Oral, 4x Daily, Imelda Barreto PA-C, 500,000 Units at 08/11/18 0946    bicalutamide (CASODEX) chemo tablet 50 mg, 50 mg, Oral, Daily, Chip Nelson MD, 50 mg at 08/11/18 0947    Fluticasone Furoate-Vilanterol 200-25 MCG/INH AEPB 1 applicator, 1 applicator, Inhalation, Daily, Halina Telles MD, 1 applicator at 18/88/03 6733    HYDROcodone-acetaminophen (NORCO)  MG per tablet 1 tablet, 1 tablet, Oral, Q6H PRN, Halina Telles MD, 1 tablet at 08/11/18 0039    levothyroxine (SYNTHROID) tablet 50 mcg, 50 mcg, Oral, Daily, Halina Telles MD, 50 mcg at 08/11/18 0710  Prior to Admission medications    Medication Sig Start Date End Date Taking? Authorizing Provider   DULoxetine (CYMBALTA) 60 MG extended release capsule Take 60 mg by mouth daily   Yes Historical Provider, MD   gabapentin (NEURONTIN) 600 MG tablet Take 1,800 mg by mouth daily. 3 at noon, 2 qam, 2 qhs .    Yes Historical Provider, MD   metFORMIN (GLUCOPHAGE-XR) 500 MG extended release tablet Take 1,000 mg by mouth nightly   Yes Historical Provider, MD   rOPINIRole (REQUIP) 4 MG tablet Take 4 mg by mouth 2 times daily   Yes Historical Provider, MD   tiZANidine (ZANAFLEX) 4 MG tablet Take 4 mg by mouth 3 times daily   Yes Historical Provider, MD omeprazole (PRILOSEC) 40 MG delayed release capsule Take 40 mg by mouth daily   Yes Historical Provider, MD   Fluticasone Furoate-Vilanterol (BREO ELLIPTA) 200-25 MCG/INH AEPB Inhale 1 applicator into the lungs daily    Yes Historical Provider, MD   gabapentin (NEURONTIN) 600 MG tablet Take 1,200 mg by mouth 2 times daily. 3 at noon, 2 qam, 2 at night. Yes Historical Provider, MD   hydrOXYzine (VISTARIL) 25 MG capsule Take 25 mg by mouth nightly    Yes Historical Provider, MD   levothyroxine (SYNTHROID) 50 MCG tablet Take 50 mcg by mouth Daily   Yes Historical Provider, MD   oxyCODONE-acetaminophen (PERCOCET) 5-325 MG per tablet Take 1-2 tablets by mouth every 8 hours as needed for Pain. .   Yes Historical Provider, MD   albuterol (PROVENTIL) (2.5 MG/3ML) 0.083% nebulizer solution Take 2.5 mg by nebulization 4 times daily   Yes Historical Provider, MD     Additional information:       PHYSICAL:   Heart:  [x]Regular rate and rhythm  []Other:    Lungs:  [x]Clear    []Other:    Abdomen: [x]Soft    []Other:    Mental Status: [x]Alert & Oriented  []Other:      VITAL SIGNS   Patient Vitals for the past 24 hrs:   BP Temp Temp src Pulse Resp SpO2   08/11/18 1117 118/67 - - 96 17 95 %   08/11/18 0926 115/66 97.7 °F (36.5 °C) Oral 70 18 94 %   08/11/18 0526 134/76 98 °F (36.7 °C) Oral 80 16 95 %   08/10/18 2336 135/64 98.1 °F (36.7 °C) Oral 85 14 93 %   08/10/18 2129 - 98.8 °F (37.1 °C) Oral - - -   08/10/18 1516 (!) 119/58 97.6 °F (36.4 °C) - 64 16 97 %   08/10/18 1148 121/64 97.8 °F (36.6 °C) Oral 87 16 97 %       PLANNED PROCEDURE   []EGD  [x]Colonoscopy []Flex Sigmoid  []ERCP []EUS   []Cystoscopy  [] CATH [] BRONCH   Consent: I have discussed with the patient and/or the patient representative the indication, alternatives, and the possible risks and/or complications of the planned procedure and the anesthesia methods.  The patient and/or patient representative appear to understand and agree to proceed. SEDATION  Planned agent:[x]Midazolam []Meperidine [x]Sublimaze []Morphine  []Diazepam  []Other:     ASA Classification: Class 3 - A patient with severe systemic disease that limits activity but is not incapacitating    Airway Assessment: Mallampati Class II - (soft palate, fauces & uvula are visible)    Monitoring and Safety: The patient will be placed on a cardiac monitor and vital signs, pulse oximetry and level of consciousness will be continuously evaluated throughout the procedure. The patient will be closely monitored until recovery from the medications is complete and the patient has returned to baseline status. Respiratory therapy will be on standby during the procedure. [x]Pre-procedure diagnostic studies complete and results available. Comment:    [x]Previous sedation/anesthesia experiences assessed. Comment:    [x]The patient is an appropriate candidate to undergo the planned procedure sedation and anesthesia. (Refer to nursing sedation/analgesia documentation record)  [x]Formulation and discussion of sedation/procedure plan, risks, and expectations with patient and/or responsible adult completed. [x]Patient examined immediately prior to the procedure.  (Refer to nursing sedation/analgesia documentation record)    Kumar Sy MD   Electronically signed 8/11/2018 at 11:29 AM

## 2018-08-12 ENCOUNTER — APPOINTMENT (OUTPATIENT)
Dept: GENERAL RADIOLOGY | Age: 63
DRG: 518 | End: 2018-08-12
Payer: MEDICARE

## 2018-08-12 LAB
GLUCOSE BLD-MCNC: 103 MG/DL (ref 70–108)
GLUCOSE BLD-MCNC: 135 MG/DL (ref 70–108)
GLUCOSE BLD-MCNC: 203 MG/DL (ref 70–108)
GLUCOSE BLD-MCNC: 229 MG/DL (ref 70–108)
GLUCOSE BLD-MCNC: 73 MG/DL (ref 70–108)

## 2018-08-12 PROCEDURE — 2580000003 HC RX 258: Performed by: INTERNAL MEDICINE

## 2018-08-12 PROCEDURE — 2060000000 HC ICU INTERMEDIATE R&B

## 2018-08-12 PROCEDURE — 6370000000 HC RX 637 (ALT 250 FOR IP): Performed by: PHYSICAL MEDICINE & REHABILITATION

## 2018-08-12 PROCEDURE — 6370000000 HC RX 637 (ALT 250 FOR IP): Performed by: INTERNAL MEDICINE

## 2018-08-12 PROCEDURE — 6370000000 HC RX 637 (ALT 250 FOR IP): Performed by: PHYSICIAN ASSISTANT

## 2018-08-12 PROCEDURE — 6370000000 HC RX 637 (ALT 250 FOR IP): Performed by: NURSE PRACTITIONER

## 2018-08-12 PROCEDURE — 6360000002 HC RX W HCPCS: Performed by: NEUROLOGICAL SURGERY

## 2018-08-12 PROCEDURE — 74018 RADEX ABDOMEN 1 VIEW: CPT

## 2018-08-12 PROCEDURE — 2720000010 HC SURG SUPPLY STERILE

## 2018-08-12 PROCEDURE — 6360000002 HC RX W HCPCS: Performed by: INTERNAL MEDICINE

## 2018-08-12 PROCEDURE — 99024 POSTOP FOLLOW-UP VISIT: CPT | Performed by: NEUROLOGICAL SURGERY

## 2018-08-12 PROCEDURE — 0D9E8ZZ DRAINAGE OF LARGE INTESTINE, VIA NATURAL OR ARTIFICIAL OPENING ENDOSCOPIC: ICD-10-PCS | Performed by: INTERNAL MEDICINE

## 2018-08-12 PROCEDURE — 99152 MOD SED SAME PHYS/QHP 5/>YRS: CPT | Performed by: INTERNAL MEDICINE

## 2018-08-12 PROCEDURE — 94640 AIRWAY INHALATION TREATMENT: CPT

## 2018-08-12 PROCEDURE — 2709999900 HC NON-CHARGEABLE SUPPLY

## 2018-08-12 PROCEDURE — 99153 MOD SED SAME PHYS/QHP EA: CPT | Performed by: INTERNAL MEDICINE

## 2018-08-12 PROCEDURE — 3609027000 HC COLONOSCOPY: Performed by: INTERNAL MEDICINE

## 2018-08-12 PROCEDURE — 82948 REAGENT STRIP/BLOOD GLUCOSE: CPT

## 2018-08-12 RX ORDER — SODIUM CHLORIDE 0.9 % (FLUSH) 0.9 %
10 SYRINGE (ML) INJECTION PRN
Status: DISCONTINUED | OUTPATIENT
Start: 2018-08-12 | End: 2018-08-18 | Stop reason: ALTCHOICE

## 2018-08-12 RX ORDER — NEOSTIGMINE METHYLSULFATE 1 MG/ML
1 INJECTION, SOLUTION INTRAVENOUS EVERY 6 HOURS
Status: COMPLETED | OUTPATIENT
Start: 2018-08-12 | End: 2018-08-13

## 2018-08-12 RX ORDER — SODIUM CHLORIDE 0.9 % (FLUSH) 0.9 %
10 SYRINGE (ML) INJECTION EVERY 12 HOURS
Status: DISCONTINUED | OUTPATIENT
Start: 2018-08-12 | End: 2018-08-22 | Stop reason: HOSPADM

## 2018-08-12 RX ORDER — PANTOPRAZOLE SODIUM 40 MG/1
40 TABLET, DELAYED RELEASE ORAL
Status: DISCONTINUED | OUTPATIENT
Start: 2018-08-12 | End: 2018-08-18 | Stop reason: ALTCHOICE

## 2018-08-12 RX ADMIN — POLYETHYLENE GLYCOL 3350 17 G: 17 POWDER, FOR SOLUTION ORAL at 09:19

## 2018-08-12 RX ADMIN — NYSTATIN 500000 UNITS: 100000 SUSPENSION ORAL at 09:10

## 2018-08-12 RX ADMIN — IPRATROPIUM BROMIDE AND ALBUTEROL SULFATE 1 AMPULE: .5; 3 SOLUTION RESPIRATORY (INHALATION) at 12:42

## 2018-08-12 RX ADMIN — SENNOSIDES 17.2 MG: 8.6 TABLET, FILM COATED ORAL at 23:04

## 2018-08-12 RX ADMIN — DEXAMETHASONE SODIUM PHOSPHATE 2 MG: 4 INJECTION, SOLUTION INTRA-ARTICULAR; INTRALESIONAL; INTRAMUSCULAR; INTRAVENOUS; SOFT TISSUE at 06:16

## 2018-08-12 RX ADMIN — NEOSTIGMINE METHYLSULFATE 1 MG: 1 INJECTION, SOLUTION INTRAVENOUS at 20:15

## 2018-08-12 RX ADMIN — FUROSEMIDE 10 MG: 10 INJECTION, SOLUTION INTRAMUSCULAR; INTRAVENOUS at 09:19

## 2018-08-12 RX ADMIN — Medication 10 ML: at 22:05

## 2018-08-12 RX ADMIN — INSULIN LISPRO 4 UNITS: 100 INJECTION, SOLUTION INTRAVENOUS; SUBCUTANEOUS at 13:04

## 2018-08-12 RX ADMIN — DOCUSATE SODIUM 100 MG: 100 CAPSULE, LIQUID FILLED ORAL at 23:04

## 2018-08-12 RX ADMIN — BACLOFEN 10 MG: 10 TABLET ORAL at 14:09

## 2018-08-12 RX ADMIN — FUROSEMIDE 10 MG: 10 INJECTION, SOLUTION INTRAMUSCULAR; INTRAVENOUS at 16:30

## 2018-08-12 RX ADMIN — DEXTROSE MONOHYDRATE 12.5 G: 25 INJECTION, SOLUTION INTRAVENOUS at 18:28

## 2018-08-12 RX ADMIN — TRAZODONE HYDROCHLORIDE 50 MG: 50 TABLET ORAL at 23:04

## 2018-08-12 RX ADMIN — FLUTICASONE FUROATE AND VILANTEROL: 200; 25 POWDER RESPIRATORY (INHALATION) at 08:35

## 2018-08-12 RX ADMIN — NYSTATIN 500000 UNITS: 100000 SUSPENSION ORAL at 23:04

## 2018-08-12 RX ADMIN — IPRATROPIUM BROMIDE AND ALBUTEROL SULFATE 1 AMPULE: .5; 3 SOLUTION RESPIRATORY (INHALATION) at 22:25

## 2018-08-12 RX ADMIN — PREGABALIN 75 MG: 75 CAPSULE ORAL at 09:19

## 2018-08-12 RX ADMIN — NEOSTIGMINE METHYLSULFATE 1 MG: 1 INJECTION, SOLUTION INTRAVENOUS at 14:10

## 2018-08-12 RX ADMIN — DULOXETINE HYDROCHLORIDE 60 MG: 60 CAPSULE, DELAYED RELEASE ORAL at 09:10

## 2018-08-12 RX ADMIN — BISACODYL 10 MG: 10 SUPPOSITORY RECTAL at 09:19

## 2018-08-12 RX ADMIN — INSULIN LISPRO 2 UNITS: 100 INJECTION, SOLUTION INTRAVENOUS; SUBCUTANEOUS at 23:08

## 2018-08-12 RX ADMIN — BACLOFEN 10 MG: 10 TABLET ORAL at 09:10

## 2018-08-12 RX ADMIN — Medication 10 ML: at 09:00

## 2018-08-12 RX ADMIN — Medication 10 ML: at 16:37

## 2018-08-12 RX ADMIN — BACLOFEN 10 MG: 10 TABLET ORAL at 23:03

## 2018-08-12 RX ADMIN — FUROSEMIDE 10 MG: 10 INJECTION, SOLUTION INTRAMUSCULAR; INTRAVENOUS at 00:15

## 2018-08-12 RX ADMIN — NYSTATIN 500000 UNITS: 100000 SUSPENSION ORAL at 13:06

## 2018-08-12 RX ADMIN — Medication 10 ML: at 16:33

## 2018-08-12 RX ADMIN — DOCUSATE SODIUM 100 MG: 100 CAPSULE, LIQUID FILLED ORAL at 09:19

## 2018-08-12 RX ADMIN — IPRATROPIUM BROMIDE AND ALBUTEROL SULFATE 1 AMPULE: .5; 3 SOLUTION RESPIRATORY (INHALATION) at 08:34

## 2018-08-12 RX ADMIN — INSULIN GLARGINE 36 UNITS: 100 INJECTION, SOLUTION SUBCUTANEOUS at 09:18

## 2018-08-12 RX ADMIN — PREGABALIN 75 MG: 75 CAPSULE ORAL at 23:03

## 2018-08-12 RX ADMIN — DEXAMETHASONE SODIUM PHOSPHATE 2 MG: 4 INJECTION, SOLUTION INTRA-ARTICULAR; INTRALESIONAL; INTRAMUSCULAR; INTRAVENOUS; SOFT TISSUE at 18:42

## 2018-08-12 RX ADMIN — ONDANSETRON 4 MG: 2 INJECTION INTRAMUSCULAR; INTRAVENOUS at 16:37

## 2018-08-12 RX ADMIN — BICALUTAMIDE 50 MG: 50 TABLET, FILM COATED ORAL at 09:10

## 2018-08-12 RX ADMIN — LEVOTHYROXINE SODIUM 50 MCG: 50 TABLET ORAL at 06:46

## 2018-08-12 RX ADMIN — PANTOPRAZOLE SODIUM 40 MG: 40 TABLET, DELAYED RELEASE ORAL at 12:51

## 2018-08-12 RX ADMIN — Medication 10 ML: at 18:42

## 2018-08-12 ASSESSMENT — PAIN - FUNCTIONAL ASSESSMENT: PAIN_FUNCTIONAL_ASSESSMENT: 0-10

## 2018-08-12 ASSESSMENT — PAIN SCALES - GENERAL
PAINLEVEL_OUTOF10: 2
PAINLEVEL_OUTOF10: 2
PAINLEVEL_OUTOF10: 0
PAINLEVEL_OUTOF10: 2
PAINLEVEL_OUTOF10: 7

## 2018-08-12 ASSESSMENT — PAIN DESCRIPTION - DESCRIPTORS
DESCRIPTORS: ACHING
DESCRIPTORS: SHARP

## 2018-08-12 ASSESSMENT — PAIN DESCRIPTION - LOCATION: LOCATION: BACK

## 2018-08-12 ASSESSMENT — PAIN DESCRIPTION - PAIN TYPE: TYPE: SURGICAL PAIN

## 2018-08-12 ASSESSMENT — PAIN DESCRIPTION - FREQUENCY: FREQUENCY: CONTINUOUS

## 2018-08-12 NOTE — PRE SEDATION
Hernan Hines MD, 40 mg at 08/12/18 1251    neostigmine (BLOXIVERZ) injection 1 mg, 1 mg, Subcutaneous, Q6H, Tiny Milligan MD, 1 mg at 08/12/18 1410    insulin lispro (HUMALOG) injection vial 0-12 Units, 0-12 Units, Subcutaneous, TID WC, Singh You MD, 4 Units at 08/12/18 1304    insulin lispro (HUMALOG) injection vial 0-6 Units, 0-6 Units, Subcutaneous, Nightly, Singh You MD, 2 Units at 08/11/18 2009    Dexamethasone Sodium Phosphate injection 2 mg, 2 mg, Intravenous, Q12H, Kp Winter MD, 2 mg at 08/12/18 0616    insulin glargine (LANTUS) injection vial 36 Units, 36 Units, Subcutaneous, Daily, Jens Delgado MD, 36 Units at 08/12/18 0918    lidocaine (LIDODERM) 5 % 2 patch, 2 patch, Transdermal, Daily, Keyona Glorieta, APRN - CNP, 2 patch at 08/10/18 1148    pregabalin (LYRICA) capsule 75 mg, 75 mg, Oral, BID, Keyona Glorieta, APRN - CNP, 75 mg at 08/12/18 0919    traZODone (DESYREL) tablet 50 mg, 50 mg, Oral, Nightly PRN, Jens Delgado MD, 50 mg at 08/11/18 2203    docusate sodium (COLACE) capsule 100 mg, 100 mg, Oral, BID, Lenetta Cool, APRN - CNP, 100 mg at 08/12/18 0919    senna (SENOKOT) tablet 17.2 mg, 2 tablet, Oral, Nightly, Lenetta Cool, APRN - CNP, 17.2 mg at 08/11/18 2009    furosemide (LASIX) injection 10 mg, 10 mg, Intravenous, Q8H, Jens Delgado MD, 10 mg at 08/12/18 1630    bisacodyl (DULCOLAX) suppository 10 mg, 10 mg, Rectal, Daily, Lenetta Cool, APRN - CNP, 10 mg at 08/12/18 0919    baclofen (LIORESAL) tablet 10 mg, 10 mg, Oral, TID, Greg Rubio MD, 10 mg at 08/12/18 1409    oxyCODONE-acetaminophen (PERCOCET) 5-325 MG per tablet 1 tablet, 1 tablet, Oral, Q6H PRN, Jens Delgado MD, 1 tablet at 08/10/18 1100    polyethylene glycol (GLYCOLAX) packet 17 g, 17 g, Oral, Daily, Jens Delgado MD, 17 g at 08/12/18 0919    insulin regular (HUMULIN R;NOVOLIN R) injection 0-10 Units, 0-10 Units, Intravenous, PRN, Muriel So MD    dextrose 50 % solution 12.5 g, 12.5 g, Intravenous, PRN, Muriel So MD    fentaNYL (SUBLIMAZE) injection 50 mcg, 50 mcg, Intravenous, Q1H PRN, Muriel So MD, 50 mcg at 08/06/18 0300    ipratropium-albuterol (DUONEB) nebulizer solution 1 ampule, 1 ampule, Inhalation, Q4H WA, Muriel So MD, 1 ampule at 08/12/18 1242    calcium replacement protocol, , Other, RX Placeholder, Robin Bence, MD    potassium (CARDIAC) replacement protocol, , Other, RX Placeholder, Robin Bence, MD    magnesium replacement protocol, , Other, RX Placeholder, Robin Bence, MD    DULoxetine (CYMBALTA) extended release capsule 60 mg, 60 mg, Oral, Daily, Robin Bence, MD, 60 mg at 08/12/18 0910    ondansetron (ZOFRAN) injection 4 mg, 4 mg, Intravenous, Q6H PRN, Robin Bence, MD, 4 mg at 08/12/18 1637    glucose (GLUTOSE) 40 % oral gel 15 g, 15 g, Oral, PRN, Leigha Bermudez MD    dextrose 50 % solution 12.5 g, 12.5 g, Intravenous, PRN, Leigha Bermudez MD    glucagon (rDNA) injection 1 mg, 1 mg, Intramuscular, PRN, Leigha Bermudez MD    dextrose 5 % solution, 100 mL/hr, Intravenous, PRN, Leigha Bermudez MD    nystatin (MYCOSTATIN) 205526 UNIT/ML suspension 500,000 Units, 5 mL, Oral, 4x Daily, Imelda Barreto PA-C, 500,000 Units at 08/12/18 1306    bicalutamide (CASODEX) chemo tablet 50 mg, 50 mg, Oral, Daily, Stacey Damon MD, 50 mg at 08/12/18 0910    Fluticasone Furoate-Vilanterol 200-25 MCG/INH AEPB 1 applicator, 1 applicator, Inhalation, Daily, Robin Bence, MD    HYDROcodone-acetaminophen (NORCO)  MG per tablet 1 tablet, 1 tablet, Oral, Q6H PRN, Robin Bence, MD, 1 tablet at 08/11/18 2331    levothyroxine (SYNTHROID) tablet 50 mcg, 50 mcg, Oral, Daily, Robin Bence, MD, 50 mcg at 08/12/18 0646  Prior to Admission medications    Medication Sig Start Date End Date Taking?

## 2018-08-12 NOTE — PROGRESS NOTES
Gastroenterology  Progress Note    8/12/2018 11:28 AM  Subjective:   Admit Date: 8/1/2018    Interval History:  Labs reviewed, stable. Pt passing flatus, only . Significant temporarily improvement with decompression colonoscopy, feels very distended today. No  BM. Abdomen remains distended. Nausea, no vomiting al pain.   NGT removed      Diet: DIET CLEAR LIQUID;    Patient Active Problem List:     Cord compression ([facility] [address]     Acute myelopathy (HCC)     Prostate cancer, primary, with metastasis from prostate to other site [facility])     Respiratory failure requiring intubation ([facility] [address]        Medications:   Scheduled Meds:   sodium chloride flush  10 mL Intravenous Q12H    insulin lispro  0-12 Units Subcutaneous TID WC    insulin lispro  0-6 Units Subcutaneous Nightly    dexamethasone  2 mg Intravenous Q12H    insulin glargine  36 Units Subcutaneous Daily    lidocaine  2 patch Transdermal Daily    pregabalin  75 mg Oral BID    docusate sodium  100 mg Oral BID    senna  2 tablet Oral Nightly    furosemide  10 mg Intravenous Q8H    bisacodyl  10 mg Rectal Daily    baclofen  10 mg Oral TID    polyethylene glycol  17 g Oral Daily    ipratropium-albuterol  1 ampule Inhalation Q4H WA    calcium replacement protocol   Other RX Placeholder    potassium (CARDIAC) replacement protocol   Other RX Placeholder    magnesium replacement protocol   Other RX Placeholder    DULoxetine  60 mg Oral Daily    nystatin  5 mL Oral 4x Daily    bicalutamide  50 mg Oral Daily    Fluticasone Furoate-Vilanterol  1 applicator Inhalation Daily    levothyroxine  50 mcg Oral Daily     Continuous Infusions:   dextrose       CBC:   Recent Labs      08/10/18   0445  08/11/18   0550   WBC  15.5*  14.5*   HGB  8.7*  9.0*   PLT  260  280     BMP:    Recent Labs      08/10/18   1736  08/11/18   0550   NA  138   --    K  3.5  3.8   CL  96*   --    CO2  30   --    BUN  18   --    CREATININE  0.5   --    GLUCOSE  140*   --      Hepatic:   No performed.      FINDINGS:   POSTOPERATIVE CHANGES:   1. Partially imaged posterior spinal fixation hardware lower thoracic spine. 2. Disc space cages at L3-4, L4-5 and L5-S1.  3. Surgical clips overlie the inferior aspect of the mid pelvis and the medial aspect of the proximal right thigh.   LINES/TUBES/MECHANICAL DEVICES: None.   BOWEL GAS PATTERN:  1.  There is mild gaseous distention of the colon and gas within several nondistended loops of small bowel which in the right clinical setting could represent an ileus. There is a small amount stool within the ascending colon.   LUNG BASES:   1. Not included within the field of imaging.   FREE AIR: None.   MASS SHADOWS: None.   PATHOLOGIC CALCIFICATIONS: None.   OSSEOUS STRUCTURES:   1. Please refer to the CT abdomen/pelvis examination report dated 8/1/2018 for findings related to the spine.   OTHER: None.      Impression  1. [place] is mild gaseous distention of the colon and gas within several nondistended loops of small bowel which in the right clinical setting could represent an ileus. There is a small amount stool within the ascending colon.      **This report has been created using voice recognition software.  It may contain minor errors which are inherent in voice recognition technology. **     Final report electronically signed by Dr. Gallo Bhagat on 8/7/2018 9:29 AM     PROCEDURE: CT ABDOMEN PELVIS W WO CONTRAST  8/1/2018   CLINICAL INFORMATION: acute cord compression r/o mets .   COMPARISON: None.   TECHNIQUE: 5 mm axial CT images were obtained through the abdomen and pelvis before and after the administration of intravenous and oral contrast. Coronal and sagittal reconstructions were obtained.   All CT scans at this facility use dose modulation, iterative reconstruction, and/or weight-based dosing when appropriate to reduce radiation dose to as low as reasonably achievable.     FINDINGS:  R[place] Scar[place] is basilar fibrolinear scarring left greater than right.  The

## 2018-08-12 NOTE — PLAN OF CARE
Problem: Falls - Risk of:  Goal: Will remain free from falls  Will remain free from falls   Outcome: Ongoing  Patient alert and oriented x4. Bed alarmed armed. Bed wheels locked. Bedside table in reach. Patient up with assistance when ambulating. Patient verbalizes and demonstrates the use of the call light. Hourly rounding being completed. Will continue to monitor. Problem: Pain:  Goal: Pain level will decrease  Pain level will decrease   Outcome: Ongoing  Patient complains of pain in his surgical incision. Patients current pain level is a 7/10. Patients pain goal is a 4/10. Patient is receiving prn norco and/or percocet per [facility] for pain at this time. Will continue to monitor with hourly rounding. Problem: Risk for Impaired Skin Integrity  Goal: Tissue integrity - skin and mucous membranes  Structural intactness and normal physiological function of skin and  mucous membranes. Outcome: Ongoing  Patient turns every two hours with help. Patient taught to change position frequently to prevent breakdown. No new redness noted on pressure points such as elbows, back of head, heels, shoulder blades, or coccyx at this time. Will continue to monitor. Problem: Discharge Planning:  Goal: Discharged to appropriate level of care  Discharged to appropriate level of care   Patient plans to be discharged to [place] [id number] unit. Case management and social work on. Will continue to assess for further needs. Problem: Urinary Elimination:  Goal: Signs and symptoms of infection will decrease  Signs and symptoms of infection will decrease   Outcome: Ongoing  Patient afebrile this shift. Last WBC was 14.5, down from 15.5. Will continue to monitor. Problem: Nutrition  Goal: Optimal nutrition therapy  Outcome: Ongoing  Patient on a clear liquid diet at this time. Patient tolerating PO fluids. NG tube out. Will continue to monitor. Comments: Care plan reviewed with patient.   Patient verbalizes understanding of the plan of care and contributes to goal setting.

## 2018-08-12 NOTE — PROGRESS NOTES
Prophylaxis:    Data:  CBC:   Recent Labs      08/10/18   0445  08/11/18   0550   WBC  15.5*  14.5*   RBC  2.90*  3.02*   HGB  8.7*  9.0*   HCT  27.2*  28.8*   MCV  93.8  95.4*   PLT  260  280     BMP:   Recent Labs      08/10/18   1736  08/11/18   0550   NA  138   --    K  3.5  3.8   CL  96*   --    CO2  30   --    BUN  18   --    CREATININE  0.5   --      BNP: No results for input(s): BNP in the last 72 hours. PT/INR: No results for input(s): PROTIME, INR in the last 72 hours. APTT: No results for input(s): APTT in the last 72 hours. CARDIAC ENZYMES: No results for input(s): CKMB, CKMBINDEX, TROPONINT in the last 72 hours. Invalid input(s): CKTOTAL;3      Assessment/Plan:  Active Problems:    Cord compression (HCC)    Acute myelopathy (HCC)    Prostate cancer, primary, with metastasis from prostate to other site [facility])    Respiratory failure requiring intubation ([facility] [address]    Status post laminectomy with spinal fusion  Resolved Problems:    * No resolved hospital problems. *      Active Issues    PLAN    1. S/P Spinal Surgery  - bilateral pedicle screws in T5, T6, T7, T10 and T11 + posterior decompression at T8-T9  - continue post-op mgt per Neurosurgery    2. Post-op Ileus  - KUB reviewed  - GI evaluation and recommendations acknowledged with thanks  - poor response to Reglan, Neostigmine and NGT decompression  - s/p decompressive coonoscopy    3. Metastatic Prostate Cancer  - w/ spinal lesions causing paraplegia  - PM&R following  - improved with therapy plus Baclofen    4.  DM  - BG range; 144 - 225  - on Glargine 36 Units and low dose coverage  - pt is also on Dexamethasone for cord compression  - will adjust coverage for better control    Electronically signed by Pat Barrett MD on 8/12/2018 at 10:10 AM    For: Attending Physician

## 2018-08-12 NOTE — PLAN OF CARE
Problem: Impaired respiratory status  Goal: Lung sounds clear or within normal limits for patient  Outcome: Ongoing  Continue duoneb to help improve lung aeration

## 2018-08-12 NOTE — BRIEF OP NOTE
Brief Postoperative Note    Titi Naylor  YOB: 1955  [id number]    Pre-operative Diagnosis: colonic and small bowel ileus     Post-operative Diagnosis: decompression colonoscopy done  by using CO2 as insufflator  Gas     Procedure: decompression colonoscopy     Anesthesia: Moderate Sedation    Surgeons/Assistants: Tri    Estimated Blood Loss: none    Complications: none    Specimens: Was Not Obtained    Findings: decompression colonoscopy done  by using CO2 as insufflator  Gas     Electronically signed by Claribel Griggs MD on 8/12/2018 at 5:43 PM

## 2018-08-12 NOTE — PLAN OF CARE
Problem: Impaired respiratory status  Intervention: Administer nebulizer treatments  Diminished with improving aeration with treatment

## 2018-08-12 NOTE — OP NOTE
[address] [place] [address]                                 OPERATIVE REPORT    PATIENT NAME: Braden Watters                 :        1955  MED REC NO:   [id number]                           ROOM:       [id number]  ACCOUNT NO:   [de-identified]                           ADMIT DATE: 2018  PROVIDER:     Uri Medrano M.D.    Nissa Ayers OF PROCEDURE:  2018    INDICATIONS:  The patient with a history of possible ileus as well as  spinal cord compression treated surgically. The patient with chronic  ileus, failed conservative management with Relistor as well as neostigmine,  not able to move his bowels so far, passing gas only. Plan today for  decompression colonoscopy. ASA CLASSIFICATION:  III. SURGEON:  Uri Medrano M.D.    [facility]  The patient was brought to the GI lab. Consent  was obtained. The risks involved with the procedure were explained to the  patient. Informed consent was obtained. The patient was monitored during  the procedure with pulse oximetry, blood pressure monitoring, and oxygen by  nasal cannula. Sedation by incremental doses of IV Versed, total 2 mg of  Versed and 50 mcg of fentanyl given in incremental doses during the  procedure to achieve continuous conscious sedation. PROCEDURE PERFORMED:  Decompression colonoscopy. Standard video colonoscope 190 using sedation with carbon dioxide only  advanced from the rectum up the cecum. The prep was very poor. We had  difficulty to pass the sigmoid area. During the procedure, the bowels may  be distended because of his chronic ileus. A lot of washing done. With  that, exam was suboptimal.  The mucosa was not able to be examined except  in limited parts only due to poor prep. A lot of washing done, around 1.5  liters of fluid during the procedure. Scope was withdrawn very gradually  with a lot of suctioning and washing.   WE

## 2018-08-13 ENCOUNTER — APPOINTMENT (OUTPATIENT)
Dept: GENERAL RADIOLOGY | Age: 63
DRG: 518 | End: 2018-08-13
Payer: MEDICARE

## 2018-08-13 ENCOUNTER — APPOINTMENT (OUTPATIENT)
Dept: INTERVENTIONAL RADIOLOGY/VASCULAR | Age: 63
DRG: 518 | End: 2018-08-13
Payer: MEDICARE

## 2018-08-13 LAB
ANION GAP SERPL CALCULATED.3IONS-SCNC: 10 MEQ/L (ref 8–16)
BUN BLDV-MCNC: 22 MG/DL (ref 7–22)
CALCIUM SERPL-MCNC: 8.3 MG/DL (ref 8.5–10.5)
CHLORIDE BLD-SCNC: 100 MEQ/L (ref 98–111)
CO2: 29 MEQ/L (ref 23–33)
CREAT SERPL-MCNC: 0.6 MG/DL (ref 0.4–1.2)
GFR SERPL CREATININE-BSD FRML MDRD: > 90 ML/MIN/1.73M2
GLUCOSE BLD-MCNC: 115 MG/DL (ref 70–108)
GLUCOSE BLD-MCNC: 146 MG/DL (ref 70–108)
GLUCOSE BLD-MCNC: 167 MG/DL (ref 70–108)
GLUCOSE BLD-MCNC: 167 MG/DL (ref 70–108)
GLUCOSE BLD-MCNC: 99 MG/DL (ref 70–108)
POTASSIUM SERPL-SCNC: 4.2 MEQ/L (ref 3.5–5.2)
SODIUM BLD-SCNC: 139 MEQ/L (ref 135–145)

## 2018-08-13 PROCEDURE — 6370000000 HC RX 637 (ALT 250 FOR IP): Performed by: NURSE PRACTITIONER

## 2018-08-13 PROCEDURE — 6370000000 HC RX 637 (ALT 250 FOR IP): Performed by: PHYSICIAN ASSISTANT

## 2018-08-13 PROCEDURE — 97535 SELF CARE MNGMENT TRAINING: CPT

## 2018-08-13 PROCEDURE — 97530 THERAPEUTIC ACTIVITIES: CPT

## 2018-08-13 PROCEDURE — 6370000000 HC RX 637 (ALT 250 FOR IP): Performed by: PHYSICAL MEDICINE & REHABILITATION

## 2018-08-13 PROCEDURE — 6370000000 HC RX 637 (ALT 250 FOR IP): Performed by: INTERNAL MEDICINE

## 2018-08-13 PROCEDURE — 97110 THERAPEUTIC EXERCISES: CPT

## 2018-08-13 PROCEDURE — 6360000002 HC RX W HCPCS: Performed by: INTERNAL MEDICINE

## 2018-08-13 PROCEDURE — 2709999900 HC NON-CHARGEABLE SUPPLY

## 2018-08-13 PROCEDURE — 99024 POSTOP FOLLOW-UP VISIT: CPT | Performed by: NEUROLOGICAL SURGERY

## 2018-08-13 PROCEDURE — 94640 AIRWAY INHALATION TREATMENT: CPT

## 2018-08-13 PROCEDURE — 82948 REAGENT STRIP/BLOOD GLUCOSE: CPT

## 2018-08-13 PROCEDURE — 99222 1ST HOSP IP/OBS MODERATE 55: CPT | Performed by: SURGERY

## 2018-08-13 PROCEDURE — 99233 SBSQ HOSP IP/OBS HIGH 50: CPT | Performed by: PHYSICAL MEDICINE & REHABILITATION

## 2018-08-13 PROCEDURE — 6360000002 HC RX W HCPCS: Performed by: NEUROLOGICAL SURGERY

## 2018-08-13 PROCEDURE — 2580000003 HC RX 258: Performed by: INTERNAL MEDICINE

## 2018-08-13 PROCEDURE — 6370000000 HC RX 637 (ALT 250 FOR IP): Performed by: SURGERY

## 2018-08-13 PROCEDURE — 74018 RADEX ABDOMEN 1 VIEW: CPT

## 2018-08-13 PROCEDURE — 2060000000 HC ICU INTERMEDIATE R&B

## 2018-08-13 PROCEDURE — 80048 BASIC METABOLIC PNL TOTAL CA: CPT

## 2018-08-13 PROCEDURE — 36415 COLL VENOUS BLD VENIPUNCTURE: CPT

## 2018-08-13 PROCEDURE — 93970 EXTREMITY STUDY: CPT

## 2018-08-13 PROCEDURE — APPNB45 APP NON BILLABLE 31-45 MINUTES: Performed by: NURSE PRACTITIONER

## 2018-08-13 RX ORDER — DEXAMETHASONE SODIUM PHOSPHATE 4 MG/ML
1 INJECTION, SOLUTION INTRA-ARTICULAR; INTRALESIONAL; INTRAMUSCULAR; INTRAVENOUS; SOFT TISSUE DAILY
Status: DISCONTINUED | OUTPATIENT
Start: 2018-08-15 | End: 2018-08-16

## 2018-08-13 RX ORDER — DEXAMETHASONE SODIUM PHOSPHATE 4 MG/ML
1 INJECTION, SOLUTION INTRA-ARTICULAR; INTRALESIONAL; INTRAMUSCULAR; INTRAVENOUS; SOFT TISSUE EVERY 12 HOURS
Status: COMPLETED | OUTPATIENT
Start: 2018-08-13 | End: 2018-08-14

## 2018-08-13 RX ADMIN — DICLOFENAC 2 G: 10 GEL TOPICAL at 23:23

## 2018-08-13 RX ADMIN — DULOXETINE HYDROCHLORIDE 60 MG: 60 CAPSULE, DELAYED RELEASE ORAL at 10:38

## 2018-08-13 RX ADMIN — NYSTATIN 500000 UNITS: 100000 SUSPENSION ORAL at 10:39

## 2018-08-13 RX ADMIN — BICALUTAMIDE 50 MG: 50 TABLET, FILM COATED ORAL at 10:38

## 2018-08-13 RX ADMIN — BACLOFEN 10 MG: 10 TABLET ORAL at 20:52

## 2018-08-13 RX ADMIN — NALOXEGOL OXALATE 12.5 MG: 12.5 TABLET, FILM COATED ORAL at 12:56

## 2018-08-13 RX ADMIN — IPRATROPIUM BROMIDE AND ALBUTEROL SULFATE 1 AMPULE: .5; 3 SOLUTION RESPIRATORY (INHALATION) at 22:15

## 2018-08-13 RX ADMIN — LEVOTHYROXINE SODIUM 50 MCG: 50 TABLET ORAL at 07:05

## 2018-08-13 RX ADMIN — IPRATROPIUM BROMIDE AND ALBUTEROL SULFATE 1 AMPULE: .5; 3 SOLUTION RESPIRATORY (INHALATION) at 17:46

## 2018-08-13 RX ADMIN — Medication 10 ML: at 20:54

## 2018-08-13 RX ADMIN — BACLOFEN 10 MG: 10 TABLET ORAL at 15:26

## 2018-08-13 RX ADMIN — DEXAMETHASONE SODIUM PHOSPHATE 1 MG: 4 INJECTION, SOLUTION INTRA-ARTICULAR; INTRALESIONAL; INTRAMUSCULAR; INTRAVENOUS; SOFT TISSUE at 17:48

## 2018-08-13 RX ADMIN — TRAZODONE HYDROCHLORIDE 50 MG: 50 TABLET ORAL at 21:03

## 2018-08-13 RX ADMIN — BACLOFEN 10 MG: 10 TABLET ORAL at 10:38

## 2018-08-13 RX ADMIN — HYDROCODONE BITARTRATE AND ACETAMINOPHEN 1 TABLET: 10; 325 TABLET ORAL at 23:26

## 2018-08-13 RX ADMIN — NYSTATIN 500000 UNITS: 100000 SUSPENSION ORAL at 17:48

## 2018-08-13 RX ADMIN — IPRATROPIUM BROMIDE AND ALBUTEROL SULFATE 1 AMPULE: .5; 3 SOLUTION RESPIRATORY (INHALATION) at 14:37

## 2018-08-13 RX ADMIN — INSULIN LISPRO 2 UNITS: 100 INJECTION, SOLUTION INTRAVENOUS; SUBCUTANEOUS at 12:56

## 2018-08-13 RX ADMIN — FUROSEMIDE 10 MG: 10 INJECTION, SOLUTION INTRAMUSCULAR; INTRAVENOUS at 20:52

## 2018-08-13 RX ADMIN — SENNOSIDES 17.2 MG: 8.6 TABLET, FILM COATED ORAL at 20:52

## 2018-08-13 RX ADMIN — POLYETHYLENE GLYCOL 3350 17 G: 17 POWDER, FOR SOLUTION ORAL at 10:38

## 2018-08-13 RX ADMIN — Medication 10 ML: at 10:39

## 2018-08-13 RX ADMIN — FUROSEMIDE 10 MG: 10 INJECTION, SOLUTION INTRAMUSCULAR; INTRAVENOUS at 10:38

## 2018-08-13 RX ADMIN — PREGABALIN 75 MG: 75 CAPSULE ORAL at 20:52

## 2018-08-13 RX ADMIN — PANTOPRAZOLE SODIUM 40 MG: 40 TABLET, DELAYED RELEASE ORAL at 06:27

## 2018-08-13 RX ADMIN — DOCUSATE SODIUM 100 MG: 100 CAPSULE, LIQUID FILLED ORAL at 10:39

## 2018-08-13 RX ADMIN — FUROSEMIDE 10 MG: 10 INJECTION, SOLUTION INTRAMUSCULAR; INTRAVENOUS at 01:00

## 2018-08-13 RX ADMIN — NEOSTIGMINE METHYLSULFATE 1 MG: 1 INJECTION, SOLUTION INTRAVENOUS at 02:00

## 2018-08-13 RX ADMIN — DEXAMETHASONE SODIUM PHOSPHATE 2 MG: 4 INJECTION, SOLUTION INTRA-ARTICULAR; INTRALESIONAL; INTRAMUSCULAR; INTRAVENOUS; SOFT TISSUE at 06:01

## 2018-08-13 RX ADMIN — PREGABALIN 75 MG: 75 CAPSULE ORAL at 10:38

## 2018-08-13 RX ADMIN — NYSTATIN 500000 UNITS: 100000 SUSPENSION ORAL at 20:52

## 2018-08-13 RX ADMIN — IPRATROPIUM BROMIDE AND ALBUTEROL SULFATE 1 AMPULE: .5; 3 SOLUTION RESPIRATORY (INHALATION) at 09:11

## 2018-08-13 RX ADMIN — ENOXAPARIN SODIUM 40 MG: 40 INJECTION SUBCUTANEOUS at 20:53

## 2018-08-13 RX ADMIN — HYDROCODONE BITARTRATE AND ACETAMINOPHEN 1 TABLET: 10; 325 TABLET ORAL at 00:58

## 2018-08-13 RX ADMIN — HYDROCODONE BITARTRATE AND ACETAMINOPHEN 1 TABLET: 10; 325 TABLET ORAL at 15:26

## 2018-08-13 RX ADMIN — DOCUSATE SODIUM 100 MG: 100 CAPSULE, LIQUID FILLED ORAL at 20:52

## 2018-08-13 RX ADMIN — INSULIN GLARGINE 36 UNITS: 100 INJECTION, SOLUTION SUBCUTANEOUS at 10:38

## 2018-08-13 ASSESSMENT — PAIN DESCRIPTION - PAIN TYPE
TYPE: ACUTE PAIN
TYPE: ACUTE PAIN;SURGICAL PAIN
TYPE: ACUTE PAIN
TYPE: ACUTE PAIN
TYPE: ACUTE PAIN;SURGICAL PAIN
TYPE: SURGICAL PAIN
TYPE: ACUTE PAIN;SURGICAL PAIN

## 2018-08-13 ASSESSMENT — PAIN DESCRIPTION - ORIENTATION
ORIENTATION: MID

## 2018-08-13 ASSESSMENT — PAIN SCALES - GENERAL
PAINLEVEL_OUTOF10: 2
PAINLEVEL_OUTOF10: 2
PAINLEVEL_OUTOF10: 3
PAINLEVEL_OUTOF10: 5
PAINLEVEL_OUTOF10: 3
PAINLEVEL_OUTOF10: 7
PAINLEVEL_OUTOF10: 2
PAINLEVEL_OUTOF10: 7
PAINLEVEL_OUTOF10: 0
PAINLEVEL_OUTOF10: 0
PAINLEVEL_OUTOF10: 3
PAINLEVEL_OUTOF10: 1
PAINLEVEL_OUTOF10: 0

## 2018-08-13 ASSESSMENT — PAIN DESCRIPTION - DESCRIPTORS
DESCRIPTORS: ACHING

## 2018-08-13 ASSESSMENT — PAIN DESCRIPTION - PROGRESSION
CLINICAL_PROGRESSION: NOT CHANGED

## 2018-08-13 ASSESSMENT — PAIN DESCRIPTION - FREQUENCY
FREQUENCY: INTERMITTENT
FREQUENCY: CONTINUOUS
FREQUENCY: INTERMITTENT
FREQUENCY: INTERMITTENT

## 2018-08-13 ASSESSMENT — PAIN DESCRIPTION - LOCATION
LOCATION: BACK

## 2018-08-13 ASSESSMENT — PAIN DESCRIPTION - ONSET
ONSET: ON-GOING
ONSET: ON-GOING

## 2018-08-13 NOTE — PROGRESS NOTES
coils overlying the pelvis.   LINES/TUBES/MECHANICAL DEVICES:   1. The distal tip of the esophageal tube is within the stomach.   BOWEL GAS PATTERN:  1.  Stable gaseous distention of the colon and gas within several loops of small bowel which in the right clinical setting is consistent with an ileus which is not significantly changed from the previous examination.   LUNG BASES:   1. There is blunting of the left lateral costophrenic angle suggesting a small amount of pleural fluid.   FREE AIR: None.   MASS SHADOWS: None.   PATHOLOGIC CALCIFICATIONS: None.   OSSEOUS STRUCTURES:   1. No acute osseous abnormality. 2. There is generalized osteopenia. 3. Paravertebral ossifications at several levels along the lumbar spine.   OTHER: None.     Impression  1.  Stable gaseous distention of the colon and gas within several loops of small bowel which in the right clinical setting is consistent with an ileus which is not significantly changed from the previous examination.      **This report has been created using voice recognition software.  It may contain minor errors which are inherent in voice recognition technology. **     Final report electronically signed by Dr. Dorcas Painter on 8/10/2018 10:53 AM    PROCEDURE: XR ABDOMEN (KUB) (SINGLE AP VIEW)  CLINICAL INFORMATION: ileus; compare with recent series. ,    COMPARISON: No prior study. TECHNIQUE: 3 supine images    FINDINGS: Diffuse gaseous distention of the colon the colon is dilated to the level of the distal descending colon. There is gas in the rectum. Multiple loops of gas-filled nondilated small bowel are present scattered throughout the abdomen. Hernia mesh   is evident. Lumbar intervertebral spacer devices are present. Impression:    Diffuse ileus with both the gas-filled loops of small bowel and dilated colon. Continued progress imaging suggested. Previously seen NG tube is not identified on this image.     **This report has been created using voice recognition software.  It may contain minor errors which are inherent in voice recognition technology. **    Final report electronically signed by Dr. Loyda Boland on 8/9/2018 8:33 AM    PROCEDURE: XR ABDOMEN (KUB) (SINGLE AP VIEW)  8/8/2018   CLINICAL INFORMATION: Ileus. Recent back surgery. . Abdominal distention.   COMPARISON: 8/7/2018   TECHNIQUE: 3 supine images of the abdomen were obtained.     FINDINGS:   There is moderate gaseous distention of the colon and moderate gaseous distention of a few centrally located small bowel loops, consistent with moderate postoperative ileus. The overall appearance is slightly worsened since yesterday. . Kidneys are somewhat obscured. No definite renal or ureteral calculi are seen. There are a few phleboliths in the pelvis. Vascular clips are present in the pelvis from prior surgery. Metallic rods are present in the lower thoracic spine. Mild left basilar atelectasis/pneumonia.      Impression  1. Mild left basilar atelectasis/pneumonia. 2. Moderate postoperative ileus, slightly worse than yesterday.      **This report has been created using voice recognition software.  It may contain minor errors which are inherent in voice recognition technology. **     Final report electronically signed by Dr. David Gerber on 8/8/2018 1:11 PM    PROCEDURE: XR ABDOMEN (KUB) (SINGLE AP VIEW)  8/7/2018   CLINICAL INFORMATION: Constipation.   COMPARISON: No prior study.   TECHNIQUE: 3 AP supine views of the abdomen performed.      FINDINGS:   POSTOPERATIVE CHANGES:   1. Partially imaged posterior spinal fixation hardware lower thoracic spine.   2. Disc space cages at L3-4, L4-5 and L5-S1.  3. Surgical clips overlie the inferior aspect of the mid pelvis and the medial aspect of the proximal right thigh.   LINES/TUBES/MECHANICAL DEVICES: None.   BOWEL GAS PATTERN:  1.  There is mild gaseous distention of the colon and gas within several nondistended loops of small bowel which in the right clinical setting could sphincter relaxation to allow defecation.     4. GERD needs to be on PPI           Follow up in GI Clinic after discharge in week(s)    CINTIA Vazquez - CNP  8/13/2018  5:44 PM     Surgical consult appreciated     Patient is seen independently from the nurse practitioner and all  the pertinent data along with physical examination and assessment and plans are all obtained by my self and  Laboratory data, Radiology results, medications all are reviewed by my self and care is discussed extensively with the patient  and the patients nurse and all agree with plan and in addition see orders and plans    Electronically signed by Eric French MD on 8/13/2018 at 7:51 PM  \

## 2018-08-13 NOTE — CONSULTS
Inpatient consult to General Surgery  Consult performed by: Ingrid Tran ordered by: Biju Connell, [address]  General Surgery Consult Note  Dr. Tiago Canales  Pt Name: Lady Jj  MRN: [id number]  YOB: 1955  Date of evaluation: 8/13/2018  Primary Care Physician: Chaitanya Godwin MD  Patient evaluated at the request of  Dr. Loulou Briceño  Reason for evaluation: postoperative ileus  IMPRESSIONS:   1. Small bowel and colon ileus following lengthy decompressive lumber operation for metastatic prostate cancer  2. COPD  3. GERD  4. Diabetes   does not have any pertinent problems on file. PLANS:   1. Prokinetics, laxatives as already ordered  2. Addition of GI narcotic block aid with Movantik  3. No current signs of emergent or urgent surgical indications for perforation or ischemia  4. Given his bowel function has started to return, would not consider diversion at this time  5. Will follow  SUBJECTIVE:   History of Chief Complaint:    Jamie Flores is a 61 y.o.male who originally presented with lower extremity paralysis due to metastatic prostate cancer to the spine. He underwent a very lenghty decompressive surgery about a week ago. Prior to the procedure, he was having small bowel movements every other day with the use of laxatives. Following the procedure, he developed abdominal distention, tightness but no real pain. Films showing diffuse ileus primarily involving the colon. He was evaluated by GI who placed him on laxatives originally, performed two colonic decompressive colonoscopies and finally in prokinetics in the last 36 hours. Over the past 24 hours, he has had 3 liquid bowel movements and feels much better. Past Medical History   has a past medical history of Anxiety; Arthritis; Asthma; Cancer ([address] COPD (chronic obstructive pulmonary disease) ([address] Depression; Diabetes mellitus ([address] GERD (gastroesophageal reflux disease); Neuropathy; and Pneumonia.   Past Surgical History   has a past surgical history that includes Cervical discectomy; Lumbar spine surgery; Carpal tunnel release (Bilateral); Nasal septum surgery; eye surgery; hernia repair; Prostatectomy; Colonoscopy; Endoscopy, colon, diagnostic; pr office/outpt visit,procedure only (N/A, 8/2/2018); pr office/outpt visit,procedure only (Left, 8/11/2018); and pr office/outpt visit,procedure only (Left, 8/12/2018). Medications  Prior to Admission medications    Medication Sig Start Date End Date Taking? Authorizing Provider   DULoxetine (CYMBALTA) 60 MG extended release capsule Take 60 mg by mouth daily   Yes Historical Provider, MD   gabapentin (NEURONTIN) 600 MG tablet Take 1,800 mg by mouth daily. 3 at noon, 2 qam, 2 qhs . Yes Historical Provider, MD   metFORMIN (GLUCOPHAGE-XR) 500 MG extended release tablet Take 1,000 mg by mouth nightly   Yes Historical Provider, MD   rOPINIRole (REQUIP) 4 MG tablet Take 4 mg by mouth 2 times daily   Yes Historical Provider, MD   tiZANidine (ZANAFLEX) 4 MG tablet Take 4 mg by mouth 3 times daily   Yes Historical Provider, MD   omeprazole (PRILOSEC) 40 MG delayed release capsule Take 40 mg by mouth daily   Yes Historical Provider, MD   Fluticasone Furoate-Vilanterol (BREO ELLIPTA) 200-25 MCG/INH AEPB Inhale 1 applicator into the lungs daily    Yes Historical Provider, MD   gabapentin (NEURONTIN) 600 MG tablet Take 1,200 mg by mouth 2 times daily. 3 at noon, 2 qam, 2 at night. Yes Historical Provider, MD   hydrOXYzine (VISTARIL) 25 MG capsule Take 25 mg by mouth nightly    Yes Historical Provider, MD   levothyroxine (SYNTHROID) 50 MCG tablet Take 50 mcg by mouth Daily   Yes Historical Provider, MD   oxyCODONE-acetaminophen (PERCOCET) 5-325 MG per tablet Take 1-2 tablets by mouth every 8 hours as needed for Pain.  .   Yes Historical Provider, MD   albuterol (PROVENTIL) (2.5 MG/3ML) 0.083% nebulizer solution Take 2.5 mg by nebulization 4 times daily   Yes Historical Provider, MD

## 2018-08-13 NOTE — PROGRESS NOTES
Attempted to see pt for rounds - he was off the unit for testing. Spoke with the RN who states pt had 2 large, loose BMs today as well as flatus. Pt's wife in room and spoke with her as well. After speaking with the RN and the pt's wife, we will increase his diet to full liquids tonight and if tolerates, can advance to solid food tomorrow. Continue bowel regimen with suppository and rectal stimulation daily and as needed - reiterated this with the RN. From GI standpoint, pt can transfer to inpatient Rehab; we will follow there. Will attempt to see pt later today.

## 2018-08-13 NOTE — PROCEDURES
[address] [place] [address]                                  PROCEDURE NOTE    PATIENT NAME: Diya Reeder                 :        1955  MED REC NO:   [id number]                           ROOM:       0011  ACCOUNT NO:   [de-identified]                           ADMIT DATE: 2018  PROVIDER:     CARLY Serrano Men OF PROCEDURE:  2018    INDICATION:  The patient with history of cord compression, required  surgical management from T5 through T11, severe postop failure, chronic and  small bowel, severe distention and abdominal pain, had decompression  colonoscopy yesterday using carbon dioxide as insufflator with improvement,  but today he is not doing well, has severe pain. X-rays show very distal  large bowel as well as small bowel. Plan today for decompression  colonoscopy. DESCRIPTION OF THE PROCEDURE:  The risks involved with the procedure were  explained to the patient. Informed consent was obtained. The patient was  monitored during the procedure with pulse oximetry, blood pressure  monitoring, and oxygen by nasal cannula. Sedation by incremental doses of  IV Versed, total 2 mg of Versed and 50 mcg of fentanyl. Sphincter noted to be acceptable. Squeeze is weak. Sensation is slightly  impaired for prick, but acceptable. Plan for decompression colonoscopy  using carbon dioxide as insufflator at regular intervals. Scope was  advanced with slight difficulty up to the cecum which was confirmed by  seeing the appendiceal orifice. The bowel appeared to be distended. A lot  of washing done during this exam, more than 1 liter of fluid was injected  and aspirated. Lot of stool came out. Cecal intubation was confirmed by  appendiceal orifice. The prep was of very good quality. Scope was  withdrawn. A lot of washing and suctioning of the air done during the exam  to help decompress the bowel.   Scope

## 2018-08-13 NOTE — PLAN OF CARE
well. Abd became more tight and he developed nausea. Problem: Skin Integrity:  Goal: Will show no infection signs and symptoms  Will show no infection signs and symptoms    Outcome: Met This Shift  Site without rednes or drainage. Problem: DISCHARGE BARRIERS  Goal: Patient's continuum of care needs are met  Outcome: Ongoing      Comments: Patient and wife participating in plan of care and goal setting.

## 2018-08-13 NOTE — PROGRESS NOTES
Madison Mays 60  INPATIENT OCCUPATIONAL THERAPY  [facility] NEUROSCIENCES 4A  DAILY NOTE    Time:  Time In: 930  Time Out: 1023  Timed Code Treatment Minutes: 48 Minutes  Minutes: 53          Date: 2018  Patient Name: Horacio Butler,   Gender: male      Room: [facility]11/011-A  MRN: [id number]  : 1955  (61 y.o.)  Referring Practitioner: Dr. Thomas Zamora  Diagnosis: cord compression  Additional Pertinent Hx: Per ER note on 18:  61 y.o. male who presents to Emergency Department with gradually worsening leg weakness and lumbar back pain. Patient has history of prostates cancer diagnosis in  with a prostatectomy performed at [facility] and proceeded to receive radiation. The patient presented to [facility] ED for gradually worsening lumbar back pain and intermittent bilateral leg weakness where he was treated with Valium and morphine. Patient was discharged the same day and consulted with his PCP. His PCP became concerned his symptoms were metastatic and ordered a bone scan on 18 where patient had presence of T7 metastasis. s/p: S/P  bilateral pedicle screws in T5, T6, T7, T10 and T11 + posterior decompression at T8-T9 on 8/3/18 Per Onocology note: He had findings of metastasis of the C7 vertebra, multiple thoracic vertebra and lumbar vertebra metastasis.      Past Medical History:   Diagnosis Date    Anxiety     Arthritis     Asthma     [facility] [address]     prostate    COPD (chronic obstructive pulmonary disease) ([facility]     Depression     Diabetes mellitus (HCC)     GERD (gastroesophageal reflux disease)     Neuropathy     Pneumonia      Past Surgical History:   Procedure Laterality Date    CARPAL TUNNEL RELEASE Bilateral     CERVICAL DISCECTOMY      COLONOSCOPY      ENDOSCOPY, COLON, DIAGNOSTIC      EYE SURGERY      HERNIA REPAIR      LUMBAR SPINE SURGERY      NASAL SEPTUM SURGERY      FL OFFICE/OUTPT VISIT,PROCEDURE ONLY N/A 2018    TRANSPEDICULAR VERTEBRECTOMY AND CAGE INSERTION OF T8 WITH POSTERIOR INSTRUMENTATION OF THORACIC SPINE AND TUMOR REMOVAL performed by Tho Blanco MD at [address] OFFICE/OUTPT VISIT,PROCEDURE ONLY Left 8/11/2018    COLONOSCOPY performed by Traci Rivera MD at [facility]    [place] OFFICE/OUTPT VISIT,PROCEDURE ONLY Left 8/12/2018    COLONOSCOPY performed by Traci Rivera MD at [address]         Restrictions/Precautions:  Fall Risk                    Spinal Precautions: No Bending, No Lifting, No Twisting  Other position/activity restrictions: CHANDRIKA drain, 8/9 NG tube, paraplegia       Prior Level of Function:  ADL Assistance: Independent  Homemaking Assistance: Independent  Ambulation Assistance: Independent  Transfer Assistance: Independent  Additional Comments: Pt reports he was ambulating without AD PLOF. Pt reports pain, numbness, & unsteadiness with mobility at home since April 2018. Subjective       Subjective: Pt lying in bed with spouse present during session. Spouse and pt asking appropriate questions in regards to inpatient rehab stating thery really prefer to go there. Pain:  Pain Assessment  Patient Currently in Pain: Yes  Pain Type: Surgical pain  Pain Location: Back  Pain Frequency: Continuous (incerasing with change in positions )  Pain Intervention(s): RN notified       Objective  Overall Cognitive Status: WNL            ADL  Feeding: Setup (seated in bed)  Grooming: Supervision (seated in wheelchair for oral care using left UE to complete tasks )  LE Dressing: Dependent/Total (donning shorts lying supine in bed)          Bed mobility  Supine to Sit: Maximum assistance (x2 from sidelying to sitting )    Transfers  Slide Board: Maximum assistance (max x2 from bed to wheelchair going to left side in 4 scoots. Pt educated on tech of using slide board and hand placement during wtih cues given throughout.  Prior to initiating slide board transfer completed 1 lateral scoot to right and 1 lateral

## 2018-08-13 NOTE — PROGRESS NOTES
Covering Dr. Dianne Joel day 10    S/P  bilateral pedicle screws in T5, T6, T7, T10 and T11 + posterior decompression at T8-T9    Dx: metastatic prostate cancer + spinal compression and paraplegia     No  acute events overnight. His abdominal ileus issue is much better today. Vitals stable today   A/A/Ox3  FC x 3 with good strength in his upper extremities, in lower extremities ( strength around 1-2/5 grossly in the right leg , did not not see any movement in left today). Better sensation in his legs . Has babinski's sign in bothsides    Dressing: D/C/I    Had lower extremities DVT that showed:    1. No deep vein thrombosis throughout the bilateral lower extremities. 2. Thrombophlebitis of the left peroneal vein. A/P:    Post Op day 10    S/P  bilateral pedicle screws in T5, T6, T7, T10 and T11 + posterior decompression at T8-T9    Dx: metastatic prostate cancer + spinal compression and paraplegia    -  Improvement in his sensation.  -Fuctuation in his motor function in his lowe extremities ( but right better than left in general). -   Steroids ( 1 q 12), to be weaned over 3 days.  -  Strict blood sugar control.   -  PT and OT   - Up to the chair.  - OK for Levonex for DVT prophylaxis. - aggressive bowel protocol.  - follow up the GI recommendations. - follow up Rehab recommendations ( discharge  plan to inpatient rehab)  - Sutures removal after 12 days of his surgery. - Follow up apportionment with out patient neurosurgery clinic after 4 weeks from his discharge.

## 2018-08-13 NOTE — CARE COORDINATION
8/13/18, 2:38 PM      Sandie Washington day: 12  Location: -11/011-A Reason for admit: Cord compression [facility]) [G95.20]   Procedure: KUB 8/13/18   Impression:        1. Multiple distended gas-filled bowel loops are seen overlying the abdomen and pelvis. This involves both large and small bowel loops. This may represent a diffuse ileus. The overall degree of gaseous distention appears slightly improved from the prior   examination. Recommend continued follow-up. Treatment Plan of Care: General Surgery consult for possible colostomy or Ileostomy for colonic ileus.   PCP: Tessie Sandoval MD  Readmission Risk Score: 26%  Discharge Plan: IP Rehab vs. ECF

## 2018-08-13 NOTE — PROGRESS NOTES
Physical Medicine & Rehabilitation   Progress Note    Chief Complaint:  S/P surgical decompression performed on 08/03/18 to treat an acute T-8 spinal cord compression. This compression was caused by metastasis from prostate cancer and a pathologic fracture. Subjective:  He reported that his mid-thoracic pain was at a 4 on a pain scale of 1-10 with 1 being no pain and 10 being intolerable pain. He noted no other acute trunk or limb pain. He continues to have numbness involving both lower limbs but especially affecting the Left lower limb. He has had a small loose B.M. recently and is passing flatus. He has a Khan catheter in place. He is taking Trazodone 50 mg as needed for sleep. He had no acute concerns at this time. Rehabilitation:  Physical therapy: FIMS:  Bed Mobility: Scooting: Moderate assistance    Transfers: Bed to Chair: Dependent/Total (with use of maxi move ),  ,      FIMS:  , PT Equipment Recommendations  Other: cont to assess needs, Assessment: pt tolerated fair, initiated slideboard transfers with 2 assist, performed w/c mobility, pt incontinent of bowel and required cleaned up, pt with extensor tone noted BLE especially with BLE extension in sup, pt with 1 to 2-/5 contraction and right hip ABD/ADD while sitting in w/c, no active movement noted LLE, recommend cont PT    Occupational therapy: FIMS:   ,  , Assessment: Pt demo significant deficits in BLE, as well as tone & surgical pain. Continued OT recommended to educate Pt on compensatory strategies for ADLs & further assess safe t/f technique. Speech therapy: FIMS:        Review of Systems:  CONSTITUTIONAL:  negative  RESPIRATORY:  negative  CARDIOVASCULAR:  negative  GASTROINTESTINAL:  positive for change in bowel habits, diarrhea, constipation and abdominal distention  GENITOURINARY:  He currently has a Khan catheter in place.   MUSCULOSKELETAL:  positive for  myalgias, arthralgias, pain, stiff joints, decreased range of motion, muscle Recent Results (from the past 24 hour(s))   POCT glucose    Collection Time: 08/12/18  6:24 PM   Result Value Ref Range    POC Glucose 73 70 - 108 mg/dl   POCT Glucose    Collection Time: 08/12/18  6:53 PM   Result Value Ref Range    POC Glucose 103 70 - 108 mg/dl   POCT glucose    Collection Time: 08/12/18 11:07 PM   Result Value Ref Range    POC Glucose 203 (H) 70 - 108 mg/dl   Basic Metabolic Panel    Collection Time: 08/13/18  5:55 AM   Result Value Ref Range    Sodium 139 135 - 145 meq/L    Potassium 4.2 3.5 - 5.2 meq/L    Chloride 100 98 - 111 meq/L    CO2 29 23 - 33 meq/L    Glucose 167 (H) 70 - 108 mg/dL    BUN 22 7 - 22 mg/dL    CREATININE 0.6 0.4 - 1.2 mg/dL    Calcium 8.3 (L) 8.5 - 10.5 mg/dL   Anion Gap    Collection Time: 08/13/18  5:55 AM   Result Value Ref Range    Anion Gap 10.0 8.0 - 16.0 meq/L   Glomerular Filtration Rate, Estimated    Collection Time: 08/13/18  5:55 AM   Result Value Ref Range    Est, Glom Filt Rate >90 ml/min/1.73m2   POCT glucose    Collection Time: 08/13/18  8:40 AM   Result Value Ref Range    POC Glucose 146 (H) 70 - 108 mg/dl   POCT Glucose    Collection Time: 08/13/18 12:53 PM   Result Value Ref Range    POC Glucose 167 (H) 70 - 108 mg/dl       Impression:  · T-8 spinal cord injury due to compression from tumor with pathological fracture. S/P surgical tumor removal and decompression performed on 8/3/2018. · Paraplegia - Incomplete  · Metastatic prostate cancer. Prostate cancer initially diagnosed in 2014. S/P prostatectomy and radiation treatment. · Anemia  · Type II diabetes mellitus with hyperglycemia, uncontrolled  · COPD  Hypothyroidism  · Morbid obesity  · ED  · Acute respiratory failure requiring intubation, extubated 8/5/18  · GERD  · Asthma  · Arthritis  · Anxiety  · Depression  · Gait dysfunction  · Deficits with his A.D.L.'s.  · Bilateral lower limb increase in skin temperature. · Bilateral hand pain.   · Ileus    Plan:  · Ordered STAT bilateral lower limb

## 2018-08-13 NOTE — PROGRESS NOTES
18   --   22   CREATININE  0.5   --   0.6   GLUCOSE  140*   --   167*     Hepatic:   No results for input(s): AST, ALT, ALB, BILITOT, ALKPHOS in the last 72 hours. Troponin: No results for input(s): TROPONINI in the last 72 hours. BNP: No results for input(s): BNP in the last 72 hours. Lipids:   No results for input(s): CHOL, HDL in the last 72 hours. Invalid input(s): LDLCALCU  INR:   No results for input(s): INR in the last 72 hours.     Radiology    Objective:   Vitals: /68   Pulse 69   Temp 98.7 °F (37.1 °C) (Oral)   Resp 16   Ht 5' 11\" (1.803 m)   Wt 274 lb (124.3 kg)   SpO2 94%   BMI 38.22 kg/m²   HEENT: Head:pupils react  Neck: supple thick  Lungs: air exchange appreciated bilat  Heart: regular  rhythm   Abdomen: distended but  BS are stillappreciated  bruise on both sides of abd  Extremities: warm +  Edema all 4 ext  Neurologic:  Awake oriented paraplegic touch sensation appreciated above both knees    Impression:   :   S/p spinal surgery for metastatic prostate cancer to the spine with acute cord compression and paraplegia  With rolonged effect of anesthesia  Acute Resp failure extubated now  Anemia s/p PRBC 1 unit  Leucocytosis  Fluid retention  IRDM  Rhabdo  improved  Hypothyroidism  ED  COPD  Hypotension off pressors  Ileus clinically improved    Plan:    Cont PT OT  Will check with PMR as pt may benefit from Inpt Rehab if he can tolerate 3 hrs of therapy and pt also is interested    Ellie Sigala MD

## 2018-08-14 ENCOUNTER — APPOINTMENT (OUTPATIENT)
Dept: GENERAL RADIOLOGY | Age: 63
DRG: 518 | End: 2018-08-14
Payer: MEDICARE

## 2018-08-14 ENCOUNTER — APPOINTMENT (OUTPATIENT)
Dept: CT IMAGING | Age: 63
DRG: 518 | End: 2018-08-14
Payer: MEDICARE

## 2018-08-14 LAB
AMORPHOUS: ABNORMAL
ANION GAP SERPL CALCULATED.3IONS-SCNC: 16 MEQ/L (ref 8–16)
BACTERIA: ABNORMAL /HPF
BASOPHILS # BLD: 0.1 %
BASOPHILS ABSOLUTE: 0 THOU/MM3 (ref 0–0.1)
BILIRUBIN URINE: ABNORMAL
BLOOD, URINE: ABNORMAL
BUN BLDV-MCNC: 24 MG/DL (ref 7–22)
CALCIUM SERPL-MCNC: 7.9 MG/DL (ref 8.5–10.5)
CASTS UA: ABNORMAL /LPF
CHARACTER, URINE: ABNORMAL
CHLORIDE BLD-SCNC: 92 MEQ/L (ref 98–111)
CO2: 24 MEQ/L (ref 23–33)
COLOR: ABNORMAL
CREAT SERPL-MCNC: 0.9 MG/DL (ref 0.4–1.2)
CRYSTALS, UA: ABNORMAL
EOSINOPHIL # BLD: 0.1 %
EOSINOPHILS ABSOLUTE: 0 THOU/MM3 (ref 0–0.4)
EPITHELIAL CELLS, UA: ABNORMAL /HPF
ERYTHROCYTE [DISTWIDTH] IN BLOOD BY AUTOMATED COUNT: 15.1 % (ref 11.5–14.5)
ERYTHROCYTE [DISTWIDTH] IN BLOOD BY AUTOMATED COUNT: 49.3 FL (ref 35–45)
GFR SERPL CREATININE-BSD FRML MDRD: 85 ML/MIN/1.73M2
GLUCOSE BLD-MCNC: 103 MG/DL (ref 70–108)
GLUCOSE BLD-MCNC: 122 MG/DL (ref 70–108)
GLUCOSE BLD-MCNC: 128 MG/DL (ref 70–108)
GLUCOSE BLD-MCNC: 146 MG/DL (ref 70–108)
GLUCOSE URINE: NEGATIVE MG/DL
HCT VFR BLD CALC: 29.4 % (ref 42–52)
HEMOGLOBIN: 9.6 GM/DL (ref 14–18)
ICTOTEST: NEGATIVE
IMMATURE GRANS (ABS): 0.23 THOU/MM3 (ref 0–0.07)
IMMATURE GRANULOCYTES: 0.8 %
KETONES, URINE: ABNORMAL
LACTIC ACID: 1.8 MMOL/L (ref 0.5–2.2)
LEUKOCYTE ESTERASE, URINE: NEGATIVE
LYMPHOCYTES # BLD: 2.4 %
LYMPHOCYTES ABSOLUTE: 0.7 THOU/MM3 (ref 1–4.8)
MCH RBC QN AUTO: 30.4 PG (ref 26–33)
MCHC RBC AUTO-ENTMCNC: 32.7 GM/DL (ref 32.2–35.5)
MCV RBC AUTO: 93 FL (ref 80–94)
MONOCYTES # BLD: 3 %
MONOCYTES ABSOLUTE: 0.8 THOU/MM3 (ref 0.4–1.3)
MRSA SCREEN RT-PCR: NEGATIVE
MUCUS: ABNORMAL
NITRITE, URINE: POSITIVE
NUCLEATED RED BLOOD CELLS: 0 /100 WBC
PH UA: 5
PLATELET # BLD: 290 THOU/MM3 (ref 130–400)
PLATELET ESTIMATE: ADEQUATE
PMV BLD AUTO: 9.2 FL (ref 9.4–12.4)
POTASSIUM SERPL-SCNC: 3.8 MEQ/L (ref 3.5–5.2)
PROTEIN UA: 30
RBC # BLD: 3.16 MILL/MM3 (ref 4.7–6.1)
RBC URINE: ABNORMAL /HPF
RENAL EPITHELIAL, UA: ABNORMAL
SCAN OF BLOOD SMEAR: NORMAL
SEG NEUTROPHILS: 93.6 %
SEGMENTED NEUTROPHILS ABSOLUTE COUNT: 25.5 THOU/MM3 (ref 1.8–7.7)
SODIUM BLD-SCNC: 132 MEQ/L (ref 135–145)
SPECIFIC GRAVITY, URINE: 1.02 (ref 1–1.03)
UROBILINOGEN, URINE: 2 EU/DL
WBC # BLD: 27.2 THOU/MM3 (ref 4.8–10.8)
WBC UA: ABNORMAL /HPF

## 2018-08-14 PROCEDURE — 36415 COLL VENOUS BLD VENIPUNCTURE: CPT

## 2018-08-14 PROCEDURE — 6370000000 HC RX 637 (ALT 250 FOR IP): Performed by: NURSE PRACTITIONER

## 2018-08-14 PROCEDURE — 87070 CULTURE OTHR SPECIMN AEROBIC: CPT

## 2018-08-14 PROCEDURE — 6370000000 HC RX 637 (ALT 250 FOR IP): Performed by: INTERNAL MEDICINE

## 2018-08-14 PROCEDURE — 6360000002 HC RX W HCPCS: Performed by: INTERNAL MEDICINE

## 2018-08-14 PROCEDURE — 2580000003 HC RX 258: Performed by: INTERNAL MEDICINE

## 2018-08-14 PROCEDURE — 36592 COLLECT BLOOD FROM PICC: CPT

## 2018-08-14 PROCEDURE — 72128 CT CHEST SPINE W/O DYE: CPT

## 2018-08-14 PROCEDURE — 87186 SC STD MICRODIL/AGAR DIL: CPT

## 2018-08-14 PROCEDURE — 83605 ASSAY OF LACTIC ACID: CPT

## 2018-08-14 PROCEDURE — 87205 SMEAR GRAM STAIN: CPT

## 2018-08-14 PROCEDURE — 99232 SBSQ HOSP IP/OBS MODERATE 35: CPT | Performed by: SURGERY

## 2018-08-14 PROCEDURE — 2000000000 HC ICU R&B

## 2018-08-14 PROCEDURE — 87801 DETECT AGNT MULT DNA AMPLI: CPT

## 2018-08-14 PROCEDURE — 97535 SELF CARE MNGMENT TRAINING: CPT

## 2018-08-14 PROCEDURE — 97110 THERAPEUTIC EXERCISES: CPT

## 2018-08-14 PROCEDURE — 2709999900 HC NON-CHARGEABLE SUPPLY

## 2018-08-14 PROCEDURE — 6370000000 HC RX 637 (ALT 250 FOR IP): Performed by: PHYSICIAN ASSISTANT

## 2018-08-14 PROCEDURE — 81001 URINALYSIS AUTO W/SCOPE: CPT

## 2018-08-14 PROCEDURE — 6360000002 HC RX W HCPCS: Performed by: NEUROLOGICAL SURGERY

## 2018-08-14 PROCEDURE — 6370000000 HC RX 637 (ALT 250 FOR IP): Performed by: SURGERY

## 2018-08-14 PROCEDURE — 87075 CULTR BACTERIA EXCEPT BLOOD: CPT

## 2018-08-14 PROCEDURE — 94640 AIRWAY INHALATION TREATMENT: CPT

## 2018-08-14 PROCEDURE — 87040 BLOOD CULTURE FOR BACTERIA: CPT

## 2018-08-14 PROCEDURE — 80048 BASIC METABOLIC PNL TOTAL CA: CPT

## 2018-08-14 PROCEDURE — 71045 X-RAY EXAM CHEST 1 VIEW: CPT

## 2018-08-14 PROCEDURE — 82948 REAGENT STRIP/BLOOD GLUCOSE: CPT

## 2018-08-14 PROCEDURE — 2500000003 HC RX 250 WO HCPCS: Performed by: INTERNAL MEDICINE

## 2018-08-14 PROCEDURE — 6370000000 HC RX 637 (ALT 250 FOR IP): Performed by: PHYSICAL MEDICINE & REHABILITATION

## 2018-08-14 PROCEDURE — 87086 URINE CULTURE/COLONY COUNT: CPT

## 2018-08-14 PROCEDURE — 87077 CULTURE AEROBIC IDENTIFY: CPT

## 2018-08-14 PROCEDURE — 87641 MR-STAPH DNA AMP PROBE: CPT

## 2018-08-14 PROCEDURE — 87147 CULTURE TYPE IMMUNOLOGIC: CPT

## 2018-08-14 PROCEDURE — 85025 COMPLETE CBC W/AUTO DIFF WBC: CPT

## 2018-08-14 PROCEDURE — 87081 CULTURE SCREEN ONLY: CPT

## 2018-08-14 PROCEDURE — APPNB15 APP NON BILLABLE TIME 0-15 MINS: Performed by: PHYSICIAN ASSISTANT

## 2018-08-14 PROCEDURE — 99232 SBSQ HOSP IP/OBS MODERATE 35: CPT | Performed by: NURSE PRACTITIONER

## 2018-08-14 RX ORDER — 0.9 % SODIUM CHLORIDE 0.9 %
250 INTRAVENOUS SOLUTION INTRAVENOUS ONCE
Status: DISCONTINUED | OUTPATIENT
Start: 2018-08-14 | End: 2018-08-18 | Stop reason: ALTCHOICE

## 2018-08-14 RX ORDER — 0.9 % SODIUM CHLORIDE 0.9 %
250 INTRAVENOUS SOLUTION INTRAVENOUS ONCE
Status: COMPLETED | OUTPATIENT
Start: 2018-08-14 | End: 2018-08-14

## 2018-08-14 RX ORDER — ACETAMINOPHEN 650 MG/1
650 SUPPOSITORY RECTAL EVERY 4 HOURS PRN
Status: DISCONTINUED | OUTPATIENT
Start: 2018-08-14 | End: 2018-08-15 | Stop reason: SDUPTHER

## 2018-08-14 RX ORDER — ACETAMINOPHEN 650 MG/1
SUPPOSITORY RECTAL
Status: DISPENSED
Start: 2018-08-14 | End: 2018-08-15

## 2018-08-14 RX ORDER — SODIUM CHLORIDE 9 MG/ML
INJECTION, SOLUTION INTRAVENOUS CONTINUOUS
Status: DISCONTINUED | OUTPATIENT
Start: 2018-08-14 | End: 2018-08-18 | Stop reason: ALTCHOICE

## 2018-08-14 RX ADMIN — BACLOFEN 10 MG: 10 TABLET ORAL at 11:12

## 2018-08-14 RX ADMIN — DEXAMETHASONE SODIUM PHOSPHATE 1 MG: 4 INJECTION, SOLUTION INTRA-ARTICULAR; INTRALESIONAL; INTRAMUSCULAR; INTRAVENOUS; SOFT TISSUE at 05:27

## 2018-08-14 RX ADMIN — LEVOTHYROXINE SODIUM 50 MCG: 50 TABLET ORAL at 06:16

## 2018-08-14 RX ADMIN — BACLOFEN 10 MG: 10 TABLET ORAL at 21:01

## 2018-08-14 RX ADMIN — SODIUM CHLORIDE 250 ML: 9 INJECTION, SOLUTION INTRAVENOUS at 10:30

## 2018-08-14 RX ADMIN — INSULIN LISPRO 1 UNITS: 100 INJECTION, SOLUTION INTRAVENOUS; SUBCUTANEOUS at 20:57

## 2018-08-14 RX ADMIN — SODIUM CHLORIDE: 9 INJECTION, SOLUTION INTRAVENOUS at 17:49

## 2018-08-14 RX ADMIN — PANTOPRAZOLE SODIUM 40 MG: 40 TABLET, DELAYED RELEASE ORAL at 05:25

## 2018-08-14 RX ADMIN — NYSTATIN 500000 UNITS: 100000 SUSPENSION ORAL at 11:11

## 2018-08-14 RX ADMIN — HYDROCODONE BITARTRATE AND ACETAMINOPHEN 1 TABLET: 10; 325 TABLET ORAL at 11:19

## 2018-08-14 RX ADMIN — PREGABALIN 75 MG: 75 CAPSULE ORAL at 11:11

## 2018-08-14 RX ADMIN — BISACODYL 10 MG: 10 SUPPOSITORY RECTAL at 11:11

## 2018-08-14 RX ADMIN — IPRATROPIUM BROMIDE AND ALBUTEROL SULFATE 1 AMPULE: .5; 3 SOLUTION RESPIRATORY (INHALATION) at 21:24

## 2018-08-14 RX ADMIN — Medication 2 MCG/MIN: at 20:00

## 2018-08-14 RX ADMIN — BACLOFEN 10 MG: 10 TABLET ORAL at 14:48

## 2018-08-14 RX ADMIN — DICLOFENAC 2 G: 10 GEL TOPICAL at 11:12

## 2018-08-14 RX ADMIN — NALOXEGOL OXALATE 12.5 MG: 12.5 TABLET, FILM COATED ORAL at 05:25

## 2018-08-14 RX ADMIN — HYDROCODONE BITARTRATE AND ACETAMINOPHEN 1 TABLET: 10; 325 TABLET ORAL at 21:01

## 2018-08-14 RX ADMIN — DEXAMETHASONE SODIUM PHOSPHATE 1 MG: 4 INJECTION, SOLUTION INTRA-ARTICULAR; INTRALESIONAL; INTRAMUSCULAR; INTRAVENOUS; SOFT TISSUE at 21:02

## 2018-08-14 RX ADMIN — ENOXAPARIN SODIUM 40 MG: 40 INJECTION SUBCUTANEOUS at 21:00

## 2018-08-14 RX ADMIN — SENNOSIDES 17.2 MG: 8.6 TABLET, FILM COATED ORAL at 21:01

## 2018-08-14 RX ADMIN — Medication 10 ML: at 12:19

## 2018-08-14 RX ADMIN — VANCOMYCIN HYDROCHLORIDE 1750 MG: 5 INJECTION, POWDER, LYOPHILIZED, FOR SOLUTION INTRAVENOUS at 23:15

## 2018-08-14 RX ADMIN — POLYETHYLENE GLYCOL 3350 17 G: 17 POWDER, FOR SOLUTION ORAL at 11:10

## 2018-08-14 RX ADMIN — DICLOFENAC 2 G: 10 GEL TOPICAL at 21:03

## 2018-08-14 RX ADMIN — DOCUSATE SODIUM 100 MG: 100 CAPSULE, LIQUID FILLED ORAL at 11:11

## 2018-08-14 RX ADMIN — CEFEPIME HYDROCHLORIDE 2 G: 2 INJECTION, POWDER, FOR SOLUTION INTRAVENOUS at 15:26

## 2018-08-14 RX ADMIN — FLUTICASONE FUROATE AND VILANTEROL 1 APPLICATOR: 200; 25 POWDER RESPIRATORY (INHALATION) at 11:29

## 2018-08-14 RX ADMIN — NYSTATIN 500000 UNITS: 100000 SUSPENSION ORAL at 21:01

## 2018-08-14 RX ADMIN — IPRATROPIUM BROMIDE AND ALBUTEROL SULFATE 1 AMPULE: .5; 3 SOLUTION RESPIRATORY (INHALATION) at 13:59

## 2018-08-14 RX ADMIN — DULOXETINE HYDROCHLORIDE 60 MG: 60 CAPSULE, DELAYED RELEASE ORAL at 11:11

## 2018-08-14 RX ADMIN — DOCUSATE SODIUM 100 MG: 100 CAPSULE, LIQUID FILLED ORAL at 21:01

## 2018-08-14 RX ADMIN — VANCOMYCIN HYDROCHLORIDE 1750 MG: 5 INJECTION, POWDER, LYOPHILIZED, FOR SOLUTION INTRAVENOUS at 12:18

## 2018-08-14 RX ADMIN — Medication 10 ML: at 21:00

## 2018-08-14 RX ADMIN — HYDROCODONE BITARTRATE AND ACETAMINOPHEN 1 TABLET: 10; 325 TABLET ORAL at 05:25

## 2018-08-14 RX ADMIN — PREGABALIN 75 MG: 75 CAPSULE ORAL at 21:01

## 2018-08-14 RX ADMIN — IPRATROPIUM BROMIDE AND ALBUTEROL SULFATE 1 AMPULE: .5; 3 SOLUTION RESPIRATORY (INHALATION) at 09:48

## 2018-08-14 RX ADMIN — BICALUTAMIDE 50 MG: 50 TABLET, FILM COATED ORAL at 11:11

## 2018-08-14 RX ADMIN — FUROSEMIDE 10 MG: 10 INJECTION, SOLUTION INTRAMUSCULAR; INTRAVENOUS at 01:00

## 2018-08-14 ASSESSMENT — PAIN DESCRIPTION - PROGRESSION
CLINICAL_PROGRESSION: NOT CHANGED

## 2018-08-14 ASSESSMENT — PAIN SCALES - GENERAL
PAINLEVEL_OUTOF10: 5
PAINLEVEL_OUTOF10: 8
PAINLEVEL_OUTOF10: 8
PAINLEVEL_OUTOF10: 9
PAINLEVEL_OUTOF10: 6
PAINLEVEL_OUTOF10: 8

## 2018-08-14 ASSESSMENT — PAIN DESCRIPTION - DESCRIPTORS: DESCRIPTORS: ACHING

## 2018-08-14 ASSESSMENT — PAIN DESCRIPTION - ORIENTATION: ORIENTATION: MID

## 2018-08-14 ASSESSMENT — PAIN DESCRIPTION - LOCATION: LOCATION: BACK

## 2018-08-14 ASSESSMENT — PAIN DESCRIPTION - PAIN TYPE: TYPE: ACUTE PAIN

## 2018-08-14 NOTE — PROGRESS NOTES
Physical Medicine & Rehabilitation   Progress Note    Chief Complaint:  Acute T8 spinal cord compression due to metastasis and pathologic fracture s/p surgical decompression on 8/3/2018. Rehab needs. Subjective:  Patient seen lying in bed with wife at bedside. Wife states he is having a \"rough\" morning. Patient states he doesn't feel good, is having increased low back pain. Patient also with low BP and elevated HR. Dr. Kaylene Ordonez saw this morning and is completing work up for infection. He was started on vanco and zosyn empirically. Infectious disease has also been consulted. Patient currently on full liquid diet, ileus is resolving. Has been having bowel movements. GI continues to follow. Lyrica remains effective. Voltaren gel helping with arthritis pain. Precert for TCU was initiated. Discussed with patient and wife that we will follow as he gets medical work up and treatment and also keep them updated on status of precert. Patient and wife voice understanding. Rehabilitation:  Physical therapy: FIMS:  Bed Mobility:   FIMS:  , PT Equipment Recommendations  Other: cont to assess needs, Assessment: pt tolerated fair, initiated slideboard transfers with 2 assist, performed w/c mobility, pt incontinent of bowel and required cleaned up, pt with extensor tone noted BLE especially with BLE extension in sup, pt with 1 to 2-/5 contraction and right hip ABD/ADD while sitting in w/c, no active movement noted LLE, recommend cont PT    Occupational therapy: FIMS:   ,  , Assessment: Pt demo significant deficits in BLE, as well as tone & surgical pain. Continued OT recommended to educate Pt on compensatory strategies for ADLs & further assess safe t/f technique.        Review of Systems:  CONSTITUTIONAL:  negative  RESPIRATORY:  positive for  dyspnea and oxygen per nasal cannula  CARDIOVASCULAR:  negative  GASTROINTESTINAL:  positive for nausea, abdominal pain and abdominal distention  GENITOURINARY: negative  MUSCULOSKELETAL:  negative for  myalgias, joint swelling, stiff joints, decreased range of motion and muscle weakness  NEUROLOGICAL:  positive for coordination problems, gait problems, weakness, numbness, pain and tingling  BEHAVIOR/PSYCH:  positive for depressed mood  System review otherwise negative    Objective:  BP (!) 88/53   Pulse 111   Temp 98.6 °F (37 °C) (Oral)   Resp 20   Ht 5' 11\" (1.803 m)   Wt 277 lb (125.6 kg)   SpO2 90%   BMI 38.63 kg/m²     awake  Orientation:   person, place, time  Mood: depressed  Affect: depressed  General appearance: Patient is well nourished, well developed, well groomed and in no acute distress, obese, appearing stated age, flattened affect    Memory:   normal,   Attention/Concentration: normal  Language:  normal    Cranial Nerves:  cranial nerves II-XII are grossly intact  ROM:  abnormal - bilateral lower limbs  Motor Exam:  5/5 bilateral upper extremities. Patient able to wiggle right lower extremity. Twitching seen in left lower extremity. Tone:  abnormal - extensor tone bilateral lower limbs- much improved on this date  Muscle bulk: within normal limits  Sensory:  Diminished at T10 dermatome level, patient feels he has sensation in bilateral thighs, however when asking him to close his eyes he was not able to feel light touch in entire left extremity.  His right lower extremity sensation is intact below the knee  Coordination:   abnormal - bilateral lower limbs    Skin: warm and dry, no rash or erythema  Peripheral vascular: Pulses: Normal upper and lower extremity pulses; Edema: 2+ bilateral lower limbs      Diagnostics:   Recent Results (from the past 24 hour(s))   POCT Glucose    Collection Time: 08/13/18 12:53 PM   Result Value Ref Range    POC Glucose 167 (H) 70 - 108 mg/dl   POCT glucose    Collection Time: 08/13/18  5:34 PM   Result Value Ref Range    POC Glucose 99 70 - 108 mg/dl   POCT Glucose    Collection Time: 08/13/18  8:26 PM   Result Value

## 2018-08-14 NOTE — PROGRESS NOTES
dextrose       CBC:   Recent Labs      08/14/18   0535   WBC  27.2*   HGB  9.6*   PLT  290     BMP:    Recent Labs      08/13/18   0555  08/14/18   0535   NA  139  132*   K  4.2  3.8   CL  100  92*   CO2  29  24   BUN  22  24*   CREATININE  0.6  0.9   GLUCOSE  167*  128*     Hepatic:   No results for input(s): AST, ALT, ALB, BILITOT, ALKPHOS in the last 72 hours. INR: No results for input(s): INR in the last 72 hours. Xray:   PROCEDURE: XR ABDOMEN (KUB) (SINGLE AP VIEW)  8/13/2018   CLINICAL INFORMATION: ileus; compare with recent series. ,     COMPARISON: 8/12/2018   TECHNIQUE:  AP supine abdomen 4 views       FINDINGS: Scattered loops of gas and stool-filled bowel are seen overlying the abdomen and pelvis. Multiple distended gas-filled bowel is seen overlying the abdomen and pelvis. This may represent a diffuse ileus. However, overall, the degree of gaseous distention appears slightly improved from the prior examination from 8/12/2018. Lumbar fusion surgical hardware is demonstrated along with partial visualization of thoracic fusion hardware. Mesh from prior hernia repair seen overlying the lower leftward abdomen. Multilevel degenerative disc disease of the lumbar spine is demonstrated. There is diffuse osteopenia. There is bilateral hip joint arthrosis.       Impression  1. Multiple distended gas-filled bowel loops are seen overlying the abdomen and pelvis. This involves both large and small bowel loops. This may represent a diffuse ileus. The overall degree of gaseous distention appears slightly improved from the prior   examination. Recommend continued follow-up.     **This report has been created using voice recognition software.  It may contain minor errors which are inherent in voice recognition technology. **     Final report electronically signed by Dr. Sherlyn Guzman on 8/13/2018 1:25 PM    PROCEDURE: XR ABDOMEN (KUB) (SINGLE AP VIEW)  8/10/2018   CLINICAL INFORMATION: Follow-up ileus.   COMPARISON: No prior is trace contrast retained from previous images, correlate with mild perinephric stranding associated with nephritis. The small bowel and colon are   negative for gross obstruction or inflammatory wall changes. A few scattered colonic diverticula changes are present. The ventral abdominal wall image 50 postsurgical mesh from hernia repair near the umbilicus.   There is contrast within the urinary bladder. Postsurgical instrumentation of the lumbar sacral spine are present with disc graft fusion at L3, L4-5, and L5-S1.   The T8 sclerotic lesion with compression deformities are again noted. Metastatic changes again are suspected. Additional sclerotic foci within several vertebral of the lower thoracic and lumbar spine persists considered with metastatic change to the   skeleton.      Impression  1. T8 vertebral pathologic appearing fracture with sclerosis suggesting metastatic changes of the skeleton with additional lesions within the lower thoracic and lumbar segments. No obvious high-grade stenosis of the central canal visualized.   Chronic degenerative changes of lumbar spine are visualized.   Postsurgical changes of the ventral abdominal wall with visualized periumbilical mesh.   Minimal colonic diverticulosis.     **This report has been created using voice recognition software. It may contain minor errors which are inherent in voice recognition technology. **     Final report electronically signed by Dr. Lula Frank on 8/1/2018 11:41 PM    Endoscopy Finding:      PROCEDURE PERFORMED:  Decompression colonoscopy.     Standard video colonoscope 190 using sedation with carbon dioxide only  advanced from the rectum up the cecum. The prep was very poor. We had  difficulty to pass the sigmoid area. During the procedure, the bowels may  be distended because of his chronic ileus. A lot of washing done.   With  that, exam was suboptimal.  The mucosa was not able to be examined except  in limited parts only due to poor prep. A lot of washing done, around 1.5  liters of fluid during the procedure. Scope was withdrawn very gradually  with a lot of suctioning and washing. WE replaced the air with carbon  dioxide during the procedure after that and the procedure was terminated   with no immediate complications.     IMPRESSION:  Decompression colonoscopy with very poor prep.     PLAN:  1. Imaging studies to be done in an hour to evaluate the response to  decompression colonoscopy. 2.  If that clinically improves, the patient will start eating. Continue  Using neostigmine . Also needs to wean if still distended. Decompression can be  repeated if needed in the future.           Nasrin Jhaveri M.D. Objective:   Vitals: BP (!) 90/53   Pulse 107   Temp 100.2 °F (37.9 °C) (Oral)   Resp 20   Ht 5' 11\" (1.803 m)   Wt 277 lb (125.6 kg)   SpO2 (!) 89% Comment: O2 placed at 2L nasal cannula  BMI 38.63 kg/m²     Intake/Output Summary (Last 24 hours) at 08/14/18 1336  Last data filed at 08/14/18 0503   Gross per 24 hour   Intake             2130 ml   Output             2150 ml   Net              -20 ml     Weight:  Wt Readings from Last 3 Encounters:   08/14/18 277 lb (125.6 kg)     General appearance: alert and cooperative with exam.  Well groomed, well nourished  male  Lungs: clear to auscultation bilaterally  Heart: regular rate and rhythm, S1, S2 normal, no murmur, click, rub or gallop  Abdomen:   Softly distended, non tender, active BS x 4, minimal tympany across mid right abdomen and periumbilical region. Extremities: Flaccid lower extremities bilaterally, feet cool to touch    Assessment and Plan:   1. Severe ileus, S/P decompression colonoscopy. Abdomen soft, non distended today. Pt had 3 BMs today and has been passing a large amount of flatus today. Clinically improved - KUB improved. Advance diet to full liquids tonight.   If tolerates w/o N/V or worsening distension, advance to general diet in the

## 2018-08-14 NOTE — PROGRESS NOTES
Madison Mays 60  INPATIENT OCCUPATIONAL THERAPY  [facility] NEUROSCIENCES 4A  DAILY NOTE    Time:  Time In: 8144  Time Out: 2321  Timed Code Treatment Minutes: 41 Minutes  Minutes: 41          Date: 2018  Patient Name: Devin Story,   Gender: male      Room: 4A11011-A  MRN: [id number]  : 1955  (61 y.o.)  Referring Practitioner: Dr. Alvin Carnes  Diagnosis: cord compression  Additional Pertinent Hx: Per ER note on 18:  61 y.o. male who presents to Emergency Department with gradually worsening leg weakness and lumbar back pain. Patient has history of prostates cancer diagnosis in  with a prostatectomy performed at [facility] and proceeded to receive radiation. The patient presented to [facility] ED for gradually worsening lumbar back pain and intermittent bilateral leg weakness where he was treated with Valium and morphine. Patient was discharged the same day and consulted with his PCP. His PCP became concerned his symptoms were metastatic and ordered a bone scan on 18 where patient had presence of T7 metastasis. s/p: S/P  bilateral pedicle screws in T5, T6, T7, T10 and T11 + posterior decompression at T8-T9 on 8/3/18 Per Onocology note: He had findings of metastasis of the C7 vertebra, multiple thoracic vertebra and lumbar vertebra metastasis.      Past Medical History:   Diagnosis Date    Anxiety     Arthritis     Asthma     Cancer ([address]     prostate    [address] [facility] obstructive [facility] disease) [address] [address]     Depression     Diabetes mellitus (HCC)     GERD (gastroesophageal reflux disease)     Neuropathy     Pneumonia      Past Surgical History:   Procedure Laterality Date    CARPAL TUNNEL RELEASE Bilateral     CERVICAL DISCECTOMY      COLONOSCOPY      ENDOSCOPY, COLON, DIAGNOSTIC      EYE SURGERY      HERNIA REPAIR      LUMBAR SPINE SURGERY      NASAL SEPTUM SURGERY      [place] OFFICE/OUTPT VISIT,PROCEDURE ONLY N/A 2018    TRANSPEDICULAR VERTEBRECTOMY AND CAGE INSERTION OF T8 WITH POSTERIOR INSTRUMENTATION OF THORACIC SPINE AND TUMOR REMOVAL performed by Cain Ramos MD at [facility] OFFICE/OUTPT VISIT,PROCEDURE ONLY Left 8/11/2018    COLONOSCOPY performed by Abisai Pratt MD at [facility]    [place] OFFICE/OUTPT VISIT,PROCEDURE ONLY Left 8/12/2018    COLONOSCOPY performed by Abisai Pratt MD at [address]         Restrictions/Precautions:  Fall Risk         Spinal Precautions: No Bending, No Lifting, No Twisting  Other position/activity restrictions: CHANDRIKA drain, 8/9 NG tube, paraplegia       Prior Level of Function:  ADL Assistance: Independent  Homemaking Assistance: Independent  Ambulation Assistance: Independent  Transfer Assistance: Independent  Additional Comments: Pt reports he was ambulating without AD PLOF. Pt reports pain, numbness, & unsteadiness with mobility at home since April 2018. Subjective   Subjective: Pt drowsy in bed upon arrival of therapist. Pt reporting he did not sleep well last night. Pt was noted to be sweaty & pale-nurse notified. BP 88/53, , O2 sat 91% on room air. blood sugar taken & WNL       Pain:  Pain Assessment  Patient Currently in Pain: Yes (headache, 8/10 back (nurse was notified))  Pain Level: 9       Objective  Overall Cognitive Status: Exceptions (slow processing, short attention span)           ADL  LE Dressing: Dependent/Total  Toileting: Dependent/Total (Pt was found incontinent of small amount of BM)   **wife was going to A Pt with eating breakfast as therapist was leaving the room       Bed mobility  Rolling to Left: Maximum assistance (x 1, moderate A x 1)  Rolling to Right: Maximum assistance (x 1, moderate A x 1)   **Pt required mod A for sustaining side lying as nurse checked & changed back dressing    Comment: Shoulder flexion while side lying x 5 reps with LUE (Pt slid arm across bedrail).           Activity Tolerance:  Activity Tolerance: Patient limited by fatigue;Patient limited by pain    Assessment:     Performance deficits / Impairments: Decreased functional mobility , Decreased ADL status, Decreased sensation, Decreased endurance, Decreased balance, Decreased strength, Decreased high-level IADLs  Prognosis: Fair  Discharge Recommendations: IP Rehab    Patient Education:  Patient Education: slide board transfer  Barriers to Learning: none    Equipment Recommendations: Other: Continue to assess pending progress    Safety:  Safety Devices in place: Yes  Type of devices:  All fall risk precautions in place, Call light within reach, Nurse notified    Plan:  Times per week: 6x  Current Treatment Recommendations: Functional Mobility Training, Safety Education & Training, Self-Care / ADL, Balance Training, Endurance Training, Strengthening    Goals:  Patient goals : go home    Short term goals  Time Frame for Short term goals: 2 weeks  Short term goal 1: Complete BUE light strengthening HEP with min RBs to increase UB endurance to A with t/fs  Short term goal 2: Pt to tolerate sitting at EOB with no greater than CGA for greater than 10 mins and 1-2 UE release for increased ease with ADL tasks  Short term goal 3: Tolerate further assessment of t/fs by OTR when appropriate- GOAL REVISED   Long term goals  Time Frame for Long term goals : No LTG set d/t short ELOS

## 2018-08-14 NOTE — PLAN OF CARE
Problem: Impaired respiratory status  Goal: Lung sounds clear or within normal limits for patient  Outcome: Ongoing  Keep lungs clear of any wheezes

## 2018-08-14 NOTE — PROGRESS NOTES
1600: Dr Lay Dee asking what anticoagulant patient is on. He is currently not on any. He requests that Dr Ramiro Perdomo be consulted for the reason that the patient is not on any anticoagulant medication. 1626: Dr Ramiro Perdomo returns call and states he put in his note yesterday that it is okay to start Lovenox for DVT prophylaxis. He then states that he is not the attending so the attending should be the one to order this. Dr Kingsley Ortiz paged. 1632: Dr Kingsley Ortiz returns page and states that she told the nurse on 8/10/18 that it was okay to start Lovenox if okay with Dr Ramiro Perdomo. Lovenox ordered 40mg daily.

## 2018-08-14 NOTE — PLAN OF CARE
Pneumonia Core measures:  Blood cultures prior to atb-yes  Offer vaccines-yes  Offer Home Health-pt. Will be going to Rehab or LTAC at discharge    Zone management tool for Pneumonia Diagnosis given to the patient as an education reference. Patient verbalizes understanding that the care team will be referencing this tool throughout their hospital stay and again on discharge. Nurse Jeremy East notified of the reference tool being received by the patient.

## 2018-08-14 NOTE — PLAN OF CARE
[facility]  PHYSICAL THERAPY MISSED TREATMENT NOTE  ACUTE CARE    Date: 2018  Patient Name: Titi Naylor        MRN: [id number]   : 1955  (61 y.o.)  Gender: male   Referring Practitioner: Dr. Marycruz Rodriguez  Referring Practitioner: Dr. Yonathan Zacarias  Diagnosis: cord compression  Diagnosis: cord compression         REASON FOR MISSED TREATMENT:  Missed Treat. Pt with lower BP and inc HR and HA. Nursing recommend hold PT at this time.

## 2018-08-14 NOTE — PROGRESS NOTES
Nutrition Assessment    Type and Reason for Visit: Reassess (PO/wt. check)    Nutrition Recommendations: Continue current diet as tolerated. Initiated ensure high protein TID & Activia TID. Consider MVI as able to tolerate. Malnutrition Assessment:  · Malnutrition Status: At risk for malnutrition    Nutrition Diagnosis:   · Problem: Increased nutrient needs  · Etiology: related to Catabolic illness     Signs and symptoms:  as evidenced by Presence of wounds (back surgery and metastatic CA)    Nutrition Assessment:  · Subjective Assessment: Pt. Dx: Sepsis, s/p spinal cord injury with acute cord compression due to metastatic prostate cancer;s/p 15 hour surgery 8/3 on tumor involving T7/T8 with fracture causing functional paraplegia. Illeus cinically improved per MD note (8/14) however pt. did not eat any lunch. Pt. mentions poor appetite & states he had a BM this morning BM x1 was noted in chart (8/13). Pt. Denies difficulty chewing/swallowing foods. Meds include: Dulcolax, Ca replacement, Colace, Lasix, Lantus, Humalog, Glycolax, Senokot, Zfran. Labs include: (8/14) Na 132, BUN 24, Glucose 128, Ca 7.9.    · Wound Type: Surgical Wound (8/3 back surgery;blisters on chest,abdomen,eye,shoulder)  · Current Nutrition Therapies:  · Oral Diet Orders: Full Liquid   · Oral Diet intake: 0%, 51-75%  · Oral Nutrition Supplement (ONS) Orders: Low Calorie, High Protein (activia TID)  · ONS intake:  (new order)  · Anthropometric Measures:  · Ht: 5' 11\" (180.3 cm)   · Current Body Wt: 276 lb 14.4 oz (125.6 kg) (+ 1 RLE, LLE edema, trace facial, perineal, & sacral edema noted)  · Admission Body Wt: 279 lb (126.6 kg) (8/1)  · Usual Body Wt:  (280-284# per pt. and wife )  · % Weight Change: 1.1% wt. loss,  13 days (poor intake/edema)  · Ideal Body Wt: 172 lb (78 kg), % Ideal Body 160%  · Adjusted Body Wt: 203 lb (92.1 kg), body weight adjusted for Obesity  · BMI Classification: BMI 35.0 - 39.9 Obese Class II  · Comparative

## 2018-08-14 NOTE — PROGRESS NOTES
[facility]  Dr. Magen Jaimes  Daily Progress Note  Pt Name: Terra White  Medical Record Number: [id number]  Date of Birth 1955   Today's Date: 8/14/2018  Chief complaint: sweating  ASSESSMENT   1. Hospital day # 13   2. Paralytic ileus following a lengthy back surgery for metastatic prostate cancer  3. Leukocytosis - clinically does not appear to be of abdominal origin   has a past medical history of Anxiety; Arthritis; Asthma; Cancer ([facility] [address] COPD (chronic obstructive pulmonary disease) ([facility] [address] Depression; Diabetes mellitus ([facility] [address] GERD (gastroesophageal reflux disease); Neuropathy; and Pneumonia. PLAN   1. Follow up on am abdominal films. 2. Increasing diet as tolerated. Anticipate this will be a slow process  3. Continue Movantik and bowel regimen  4. As long as films don't show anything new, then will re-evaluate as needed  SUBJECTIVE   Adelaida Galvan is complaining of sweating He denies any nausea or vomiting, has passed flatus or had a bowel movement. He is tolerating a DIET FULL LIQUID;. His pain is well controlled on current medications.     CURRENT MEDICATIONS   Scheduled Meds:   diclofenac sodium  2 g Topical BID    enoxaparin  40 mg Subcutaneous Q24H    dexamethasone  1 mg Intravenous Q12H    [START ON 8/15/2018] dexamethasone  1 mg Intravenous Daily    sodium chloride flush  10 mL Intravenous Q12H    pantoprazole  40 mg Oral QAM AC    naloxegol  12.5 mg Oral QAM    insulin lispro  0-12 Units Subcutaneous TID WC    insulin lispro  0-6 Units Subcutaneous Nightly    insulin glargine  36 Units Subcutaneous Daily    lidocaine  2 patch Transdermal Daily    pregabalin  75 mg Oral BID    docusate sodium  100 mg Oral BID    senna  2 tablet Oral Nightly    furosemide  10 mg Intravenous Q8H    bisacodyl  10 mg Rectal Daily    baclofen  10 mg Oral TID    polyethylene glycol  17 g Oral Daily    ipratropium-albuterol  1 ampule Inhalation Q4H WA    calcium [Urine:2150]    Date 08/14/18 0000 - 08/14/18 2359   Shift 5545-3068 8511-1235 9573-5156 24 Hour Total   I  N  T  A  K  E   P.O.  (mL/kg/hr) 600  (0.6)   600    I.V.  (mL/kg) 30  (0.2)   30  (0.2)    Shift Total  (mL/kg) 630  (5)   630  (5)   O  U  T  P  U  T   Urine  (mL/kg/hr) 750  (0.7)   750    Emesis/NG output  (mL/kg) 0  (0)   0  (0)    Other  (mL/kg) 0  (0)   0  (0)    Stool  (mL/kg) 0  (0)   0  (0)    Blood  (mL/kg) 0  (0)   0  (0)    Shift Total  (mL/kg) 750  (6)   750  (6)   Weight (kg) 125.6 125.6 125.6 125.6     LABS     Recent Labs      08/13/18   0555  08/14/18   0535   WBC   --   27.2*   HGB   --   9.6*   HCT   --   29.4*   PLT   --   290   NA  139  132*   K  4.2  3.8   CL  100  92*   CO2  29  24   BUN  22  24*   CREATININE  0.6  0.9   CALCIUM  8.3*  7.9*      No results for input(s): PTT, INR in the last 72 hours. Invalid input(s): PT  No results for input(s): AST, ALT, BILITOT, BILIDIR, AMYLASE, LIPASE, LDH, LACTA in the last 72 hours. No results for input(s): TROPONINT in the last 72 hours.     RADIOLOGY   Abd films 8/14/18 pending      Electronically signed by Akila Marmolejo DO on 8/14/2018 at 8:31 AM

## 2018-08-14 NOTE — FLOWSHEET NOTE
08/14/18 1634   Provider Notification   Reason for Communication Review case   Provider Name Irma Hanson   Provider Notification Physician   Method of Communication Call   Response See orders   Notified of vital signs post bolus. 92/-.7 . Order received to transfer to ICU for Levophed drip.   Titrate for SBP>100

## 2018-08-14 NOTE — PLAN OF CARE
Problem: Risk for Impaired Skin Integrity  Goal: Tissue integrity - skin and mucous membranes  Structural intactness and normal physiological function of skin and  mucous membranes. Outcome: Ongoing  Into re assess patients pressure related injuries. Patient noted on a [place] bed, settings are set appropriately. 1. Bilateral eyes, areas are noted to be healed. 2. Right wrist, a deflated blister, area is a superficial open area with a pink ulcer bed. Area measured 0.8 cmx 1.8 cm. Area appears to be a Stage 2. Cleansed area, applied skin prep, left open to air. 3. Left wrist, intact clear serious blister. Has resolved. 4. Left chest proximal, deflated blister, Area is a superficial open area with a pink ulcer bed. Area measured 3.5 cmx 5 cm. Area is dry and healing. Left open to air. 5. Left chest distal, scattered blisters to area, are is now dry and healing. Left open to air. 6. Right shoulder, deflated blister, have re absorbed. Area is dry and intact. 7. Left lower abdomen, a deflated blister. Area is a superficial open are with a pink ulcer bed. Area measured 7 cm x 8.5 cm. Area appears to be a stage 2. ABD pad applied. 8. Right lower abdomen, a deflated blister. Area is a superficial open are with a pink ulcer bed. Area measured 7.5 cmx 11.5 cm. Area appears to be a stage 2. ABD pad applied. 9. Left knee intact serious fluid filled blister. Re absorbing and is healing. Will continue to follow. Care plan reviewed with patient and RN. Patient and RN verbalize understanding of the plan of care and contribute to goal setting.       Comments:

## 2018-08-15 ENCOUNTER — APPOINTMENT (OUTPATIENT)
Dept: GENERAL RADIOLOGY | Age: 63
DRG: 518 | End: 2018-08-15
Payer: MEDICARE

## 2018-08-15 LAB
ACINETOBACTER BAUMANNII FILM ARRAY: NOT DETECTED
ALBUMIN SERPL-MCNC: 2.3 G/DL (ref 3.5–5.1)
ALP BLD-CCNC: 129 U/L (ref 38–126)
ALT SERPL-CCNC: 14 U/L (ref 11–66)
ANION GAP SERPL CALCULATED.3IONS-SCNC: 12 MEQ/L (ref 8–16)
AST SERPL-CCNC: 11 U/L (ref 5–40)
BASOPHILS # BLD: 0.1 %
BASOPHILS ABSOLUTE: 0 THOU/MM3 (ref 0–0.1)
BILIRUB SERPL-MCNC: 1.2 MG/DL (ref 0.3–1.2)
BOTTLE TYPE: ABNORMAL
BUN BLDV-MCNC: 27 MG/DL (ref 7–22)
CALCIUM SERPL-MCNC: 7.8 MG/DL (ref 8.5–10.5)
CANDIDA ALBICANS FILM ARRAY: NOT DETECTED
CANDIDA GLABRATA FILM ARRAY: NOT DETECTED
CANDIDA KRUSEI FILM ARRAY: NOT DETECTED
CANDIDA PARAPSILOSIS FILM ARRAY: NOT DETECTED
CANDIDA TROPICALIS FILM ARRAY: NOT DETECTED
CARBAPENEM RESITANT FILM ARRAY: ABNORMAL
CHLORIDE BLD-SCNC: 94 MEQ/L (ref 98–111)
CO2: 23 MEQ/L (ref 23–33)
CREAT SERPL-MCNC: 0.8 MG/DL (ref 0.4–1.2)
ENTERBACTER CLOACAE FILM ARRAY: NOT DETECTED
ENTERBACTERIACEAE FILM ARRAY: NOT DETECTED
ENTEROCOCCUS FILM ARRAY: NOT DETECTED
EOSINOPHIL # BLD: 0.3 %
EOSINOPHILS ABSOLUTE: 0 THOU/MM3 (ref 0–0.4)
ERYTHROCYTE [DISTWIDTH] IN BLOOD BY AUTOMATED COUNT: 14.9 % (ref 11.5–14.5)
ERYTHROCYTE [DISTWIDTH] IN BLOOD BY AUTOMATED COUNT: 50 FL (ref 35–45)
ESCHERICHIA COLI FILM ARRAY: NOT DETECTED
GFR SERPL CREATININE-BSD FRML MDRD: > 90 ML/MIN/1.73M2
GLUCOSE BLD-MCNC: 147 MG/DL (ref 70–108)
GLUCOSE BLD-MCNC: 148 MG/DL (ref 70–108)
GLUCOSE BLD-MCNC: 149 MG/DL (ref 70–108)
GLUCOSE BLD-MCNC: 171 MG/DL (ref 70–108)
GLUCOSE BLD-MCNC: 185 MG/DL (ref 70–108)
GLUCOSE BLD-MCNC: 238 MG/DL (ref 70–108)
HAEMOPHILUS INFLUENZA FILM ARRAY: NOT DETECTED
HCT VFR BLD CALC: 25.6 % (ref 42–52)
HEMOGLOBIN: 8.2 GM/DL (ref 14–18)
IMMATURE GRANS (ABS): 0.05 THOU/MM3 (ref 0–0.07)
IMMATURE GRANULOCYTES: 0.3 %
KLEBSIELLA OXYTOCA FILM ARRAY: NOT DETECTED
KLEBSIELLA PNEUMONIAE FILM ARRAY: NOT DETECTED
LISTERIA MONOCYTOGENES FILM ARRAY: NOT DETECTED
LYMPHOCYTES # BLD: 1.8 %
LYMPHOCYTES ABSOLUTE: 0.3 THOU/MM3 (ref 1–4.8)
MCH RBC QN AUTO: 30.1 PG (ref 26–33)
MCHC RBC AUTO-ENTMCNC: 32 GM/DL (ref 32.2–35.5)
MCV RBC AUTO: 94.1 FL (ref 80–94)
METHICILLIN RESISTANT FILM ARRAY: NOT DETECTED
MONOCYTES # BLD: 3.7 %
MONOCYTES ABSOLUTE: 0.5 THOU/MM3 (ref 0.4–1.3)
NEISSERIA MENIGITIDIS FILM ARRAY: NOT DETECTED
NUCLEATED RED BLOOD CELLS: 0 /100 WBC
PLATELET # BLD: 268 THOU/MM3 (ref 130–400)
PLATELET ESTIMATE: ADEQUATE
PMV BLD AUTO: 9.6 FL (ref 9.4–12.4)
POTASSIUM SERPL-SCNC: 3.8 MEQ/L (ref 3.5–5.2)
PROTEUS FILM ARRAY: NOT DETECTED
PSEUDOMONAS AERUGINOSA FILM ARRAY: NOT DETECTED
RBC # BLD: 2.72 MILL/MM3 (ref 4.7–6.1)
SCAN OF BLOOD SMEAR: NORMAL
SEG NEUTROPHILS: 93.8 %
SEGMENTED NEUTROPHILS ABSOLUTE COUNT: 13.6 THOU/MM3 (ref 1.8–7.7)
SERRATIA MARCESCENS FILM ARRAY: NOT DETECTED
SODIUM BLD-SCNC: 129 MEQ/L (ref 135–145)
SOURCE OF BLOOD CULTURE: ABNORMAL
STAPH AUREUS FILM ARRAY: DETECTED
STAPHYLOCOCCUS FILM ARRAY: DETECTED
STREP AGALACTIAE FILM ARRAY: NOT DETECTED
STREP PNEUMONIAE FILM ARRAY: NOT DETECTED
STREP PYOCGENES FILM ARRAY: NOT DETECTED
STREPTOCOCCUS FILM ARRAY: NOT DETECTED
TOTAL PROTEIN: 5.2 G/DL (ref 6.1–8)
VANCOMYCIN RESISTANT FILM ARRAY: ABNORMAL
WBC # BLD: 14.5 THOU/MM3 (ref 4.8–10.8)

## 2018-08-15 PROCEDURE — 99232 SBSQ HOSP IP/OBS MODERATE 35: CPT | Performed by: NURSE PRACTITIONER

## 2018-08-15 PROCEDURE — 6360000002 HC RX W HCPCS: Performed by: INTERNAL MEDICINE

## 2018-08-15 PROCEDURE — 2709999900 HC NON-CHARGEABLE SUPPLY

## 2018-08-15 PROCEDURE — 6370000000 HC RX 637 (ALT 250 FOR IP): Performed by: PHYSICIAN ASSISTANT

## 2018-08-15 PROCEDURE — 6360000002 HC RX W HCPCS: Performed by: NEUROLOGICAL SURGERY

## 2018-08-15 PROCEDURE — 6370000000 HC RX 637 (ALT 250 FOR IP): Performed by: INTERNAL MEDICINE

## 2018-08-15 PROCEDURE — 82948 REAGENT STRIP/BLOOD GLUCOSE: CPT

## 2018-08-15 PROCEDURE — 2580000003 HC RX 258: Performed by: INTERNAL MEDICINE

## 2018-08-15 PROCEDURE — 99024 POSTOP FOLLOW-UP VISIT: CPT | Performed by: NEUROLOGICAL SURGERY

## 2018-08-15 PROCEDURE — 94640 AIRWAY INHALATION TREATMENT: CPT

## 2018-08-15 PROCEDURE — 97110 THERAPEUTIC EXERCISES: CPT

## 2018-08-15 PROCEDURE — 6370000000 HC RX 637 (ALT 250 FOR IP): Performed by: NURSE PRACTITIONER

## 2018-08-15 PROCEDURE — 6370000000 HC RX 637 (ALT 250 FOR IP): Performed by: PHYSICAL MEDICINE & REHABILITATION

## 2018-08-15 PROCEDURE — 85025 COMPLETE CBC W/AUTO DIFF WBC: CPT

## 2018-08-15 PROCEDURE — 6370000000 HC RX 637 (ALT 250 FOR IP): Performed by: SURGERY

## 2018-08-15 PROCEDURE — 2000000000 HC ICU R&B

## 2018-08-15 PROCEDURE — 36415 COLL VENOUS BLD VENIPUNCTURE: CPT

## 2018-08-15 PROCEDURE — 99232 SBSQ HOSP IP/OBS MODERATE 35: CPT | Performed by: SURGERY

## 2018-08-15 PROCEDURE — 80053 COMPREHEN METABOLIC PANEL: CPT

## 2018-08-15 PROCEDURE — 74018 RADEX ABDOMEN 1 VIEW: CPT

## 2018-08-15 RX ORDER — ACETAMINOPHEN 325 MG/1
650 TABLET ORAL EVERY 4 HOURS PRN
Status: DISCONTINUED | OUTPATIENT
Start: 2018-08-15 | End: 2018-08-18 | Stop reason: ALTCHOICE

## 2018-08-15 RX ORDER — LIDOCAINE HYDROCHLORIDE 10 MG/ML
5 INJECTION, SOLUTION EPIDURAL; INFILTRATION; INTRACAUDAL; PERINEURAL ONCE
Status: DISCONTINUED | OUTPATIENT
Start: 2018-08-15 | End: 2018-08-18 | Stop reason: ALTCHOICE

## 2018-08-15 RX ORDER — SODIUM HYPOCHLORITE 1.25 MG/ML
SOLUTION TOPICAL DAILY
Status: DISCONTINUED | OUTPATIENT
Start: 2018-08-15 | End: 2018-08-22 | Stop reason: HOSPADM

## 2018-08-15 RX ORDER — SODIUM CHLORIDE 0.9 % (FLUSH) 0.9 %
10 SYRINGE (ML) INJECTION PRN
Status: DISCONTINUED | OUTPATIENT
Start: 2018-08-15 | End: 2018-08-18 | Stop reason: ALTCHOICE

## 2018-08-15 RX ORDER — SODIUM CHLORIDE 0.9 % (FLUSH) 0.9 %
10 SYRINGE (ML) INJECTION EVERY 12 HOURS SCHEDULED
Status: DISCONTINUED | OUTPATIENT
Start: 2018-08-15 | End: 2018-08-18 | Stop reason: ALTCHOICE

## 2018-08-15 RX ORDER — ACETAMINOPHEN 325 MG/1
650 TABLET ORAL EVERY 4 HOURS PRN
Status: DISCONTINUED | OUTPATIENT
Start: 2018-08-15 | End: 2018-08-15 | Stop reason: SDUPTHER

## 2018-08-15 RX ORDER — ACETAMINOPHEN 650 MG/1
650 SUPPOSITORY RECTAL EVERY 4 HOURS PRN
Status: DISCONTINUED | OUTPATIENT
Start: 2018-08-15 | End: 2018-08-22 | Stop reason: HOSPADM

## 2018-08-15 RX ADMIN — DICLOFENAC 2 G: 10 GEL TOPICAL at 09:02

## 2018-08-15 RX ADMIN — OXYCODONE HYDROCHLORIDE AND ACETAMINOPHEN 1 TABLET: 5; 325 TABLET ORAL at 22:40

## 2018-08-15 RX ADMIN — ACETAMINOPHEN 650 MG: 325 TABLET ORAL at 09:01

## 2018-08-15 RX ADMIN — Medication 2 G: at 22:06

## 2018-08-15 RX ADMIN — PREGABALIN 75 MG: 75 CAPSULE ORAL at 09:01

## 2018-08-15 RX ADMIN — POLYETHYLENE GLYCOL 3350 17 G: 17 POWDER, FOR SOLUTION ORAL at 09:01

## 2018-08-15 RX ADMIN — Medication 2 G: at 16:21

## 2018-08-15 RX ADMIN — DEXAMETHASONE SODIUM PHOSPHATE 1 MG: 4 INJECTION, SOLUTION INTRA-ARTICULAR; INTRALESIONAL; INTRAMUSCULAR; INTRAVENOUS; SOFT TISSUE at 09:01

## 2018-08-15 RX ADMIN — DULOXETINE HYDROCHLORIDE 60 MG: 60 CAPSULE, DELAYED RELEASE ORAL at 09:18

## 2018-08-15 RX ADMIN — INSULIN LISPRO 4 UNITS: 100 INJECTION, SOLUTION INTRAVENOUS; SUBCUTANEOUS at 13:19

## 2018-08-15 RX ADMIN — NYSTATIN 500000 UNITS: 100000 SUSPENSION ORAL at 22:07

## 2018-08-15 RX ADMIN — SENNOSIDES 17.2 MG: 8.6 TABLET, FILM COATED ORAL at 22:06

## 2018-08-15 RX ADMIN — NYSTATIN 500000 UNITS: 100000 SUSPENSION ORAL at 18:53

## 2018-08-15 RX ADMIN — DOCUSATE SODIUM 100 MG: 100 CAPSULE, LIQUID FILLED ORAL at 22:07

## 2018-08-15 RX ADMIN — SODIUM CHLORIDE: 9 INJECTION, SOLUTION INTRAVENOUS at 04:15

## 2018-08-15 RX ADMIN — DICLOFENAC 2 G: 10 GEL TOPICAL at 22:07

## 2018-08-15 RX ADMIN — IPRATROPIUM BROMIDE AND ALBUTEROL SULFATE 1 AMPULE: .5; 3 SOLUTION RESPIRATORY (INHALATION) at 15:45

## 2018-08-15 RX ADMIN — BACLOFEN 10 MG: 10 TABLET ORAL at 22:07

## 2018-08-15 RX ADMIN — NYSTATIN 500000 UNITS: 100000 SUSPENSION ORAL at 09:02

## 2018-08-15 RX ADMIN — SODIUM CHLORIDE: 9 INJECTION, SOLUTION INTRAVENOUS at 11:25

## 2018-08-15 RX ADMIN — OXYCODONE HYDROCHLORIDE AND ACETAMINOPHEN 1 TABLET: 5; 325 TABLET ORAL at 11:23

## 2018-08-15 RX ADMIN — IPRATROPIUM BROMIDE AND ALBUTEROL SULFATE 1 AMPULE: .5; 3 SOLUTION RESPIRATORY (INHALATION) at 11:11

## 2018-08-15 RX ADMIN — PREGABALIN 75 MG: 75 CAPSULE ORAL at 22:07

## 2018-08-15 RX ADMIN — BACLOFEN 10 MG: 10 TABLET ORAL at 09:02

## 2018-08-15 RX ADMIN — PANTOPRAZOLE SODIUM 40 MG: 40 TABLET, DELAYED RELEASE ORAL at 07:42

## 2018-08-15 RX ADMIN — OXYCODONE HYDROCHLORIDE AND ACETAMINOPHEN 1 TABLET: 5; 325 TABLET ORAL at 04:58

## 2018-08-15 RX ADMIN — BACLOFEN 10 MG: 10 TABLET ORAL at 16:22

## 2018-08-15 RX ADMIN — HYDROMORPHONE HYDROCHLORIDE 1 MG: 1 INJECTION, SOLUTION INTRAMUSCULAR; INTRAVENOUS; SUBCUTANEOUS at 14:06

## 2018-08-15 RX ADMIN — Medication 10 ML: at 09:18

## 2018-08-15 RX ADMIN — BISACODYL 10 MG: 10 SUPPOSITORY RECTAL at 09:01

## 2018-08-15 RX ADMIN — INSULIN LISPRO 1 UNITS: 100 INJECTION, SOLUTION INTRAVENOUS; SUBCUTANEOUS at 20:59

## 2018-08-15 RX ADMIN — INSULIN GLARGINE 26 UNITS: 100 INJECTION, SOLUTION SUBCUTANEOUS at 13:18

## 2018-08-15 RX ADMIN — HYDROMORPHONE HYDROCHLORIDE 1 MG: 1 INJECTION, SOLUTION INTRAMUSCULAR; INTRAVENOUS; SUBCUTANEOUS at 13:58

## 2018-08-15 RX ADMIN — CEFEPIME HYDROCHLORIDE 2 G: 2 INJECTION, POWDER, FOR SOLUTION INTRAVENOUS at 04:49

## 2018-08-15 RX ADMIN — BICALUTAMIDE 50 MG: 50 TABLET, FILM COATED ORAL at 09:01

## 2018-08-15 RX ADMIN — VANCOMYCIN HYDROCHLORIDE 1750 MG: 5 INJECTION, POWDER, LYOPHILIZED, FOR SOLUTION INTRAVENOUS at 11:23

## 2018-08-15 RX ADMIN — LEVOTHYROXINE SODIUM 50 MCG: 50 TABLET ORAL at 07:42

## 2018-08-15 RX ADMIN — ENOXAPARIN SODIUM 40 MG: 40 INJECTION SUBCUTANEOUS at 22:06

## 2018-08-15 RX ADMIN — IPRATROPIUM BROMIDE AND ALBUTEROL SULFATE 1 AMPULE: .5; 3 SOLUTION RESPIRATORY (INHALATION) at 20:08

## 2018-08-15 RX ADMIN — NALOXEGOL OXALATE 12.5 MG: 12.5 TABLET, FILM COATED ORAL at 07:42

## 2018-08-15 RX ADMIN — DOCUSATE SODIUM 100 MG: 100 CAPSULE, LIQUID FILLED ORAL at 09:01

## 2018-08-15 RX ADMIN — Medication 10 ML: at 22:07

## 2018-08-15 ASSESSMENT — PAIN SCALES - GENERAL
PAINLEVEL_OUTOF10: 7
PAINLEVEL_OUTOF10: 7
PAINLEVEL_OUTOF10: 4
PAINLEVEL_OUTOF10: 4
PAINLEVEL_OUTOF10: 7
PAINLEVEL_OUTOF10: 6
PAINLEVEL_OUTOF10: 3
PAINLEVEL_OUTOF10: 6
PAINLEVEL_OUTOF10: 0

## 2018-08-15 NOTE — PROGRESS NOTES
Oral Daily     Continuous Infusions:   sodium chloride 100 mL/hr at 08/15/18 0415    norepinephrine 2 mcg/min (08/15/18 0110)    dextrose         Labs :     CBC:   Recent Labs      08/14/18   0535   WBC  27.2*   HGB  9.6*   PLT  290     BMP:    Recent Labs      08/13/18   0555  08/14/18   0535   NA  139  132*   K  4.2  3.8   CL  100  92*   CO2  29  24   BUN  22  24*   CREATININE  0.6  0.9   GLUCOSE  167*  128*     Hepatic:   No results for input(s): AST, ALT, ALB, BILITOT, ALKPHOS in the last 72 hours. Troponin: No results for input(s): TROPONINI in the last 72 hours. BNP: No results for input(s): BNP in the last 72 hours. Lipids:   No results for input(s): CHOL, HDL in the last 72 hours. Invalid input(s): LDLCALCU  INR:   No results for input(s): INR in the last 72 hours.     Radiology    Objective:   Vitals: /60   Pulse 97   Temp 98.5 °F (36.9 °C) (Oral)   Resp 21   Ht 5' 11\" (1.803 m)   Wt 272 lb 0.8 oz (123.4 kg)   SpO2 98%   BMI 37.94 kg/m²   HEENT: Head:pupils react  Neck: supple thick  Lungs: air exchange appreciated bilat  Heart: regular  rhythm   Abdomen: distended but  BS are stillappreciated  bruise on both sides of abd  Extremities: warm +  Edema all 4 ext  Neurologic:  More alert today paraplegic    Impression:   :   Sepsis with Gram + cocci  S/p spinal surgery for metastatic prostate cancer to the spine with acute cord compression and paraplegia  With prolonged effect of anesthesia  Acute Resp failure extubated now  Anemia s/p PRBC 1 unit  Leucocytosis  Fluid retention  IRDM  Rhabdo  improved  Hypothyroidism  ED  COPD  Hypotension back on  pressors  Ileus clinically improved s/p decompression colonoscopy  Leucocytosis    Plan:    Cont antibiotics per ID  Will check with ID when pt ok for PICC as unable to get blood draw and pt also on pressors  Cont steroids per NS  F/u on Hb  PT OT to continue   Await final cult results    Halina Telles MD

## 2018-08-15 NOTE — PROGRESS NOTES
[facility]  Dr. Kari Jaimes  Daily Progress Note  Pt Name: Ruma See  Medical Record Number: [id number]  Date of Birth 1955   Today's Date: 8/15/2018  Chief complaint: sweating  ASSESSMENT   1. Hospital day # 14   2. Paralytic ileus following a lengthy back surgery for metastatic prostate cancer  3. Leukocytosis - clinically does not appear to be of abdominal origin - UTI confirmed, Bactremia confirmed possible central line involvement, likely related to thoracic wound   has a past medical history of Anxiety; Arthritis; Asthma; Cancer ([facility] [address] COPD (chronic obstructive pulmonary disease) ([facility] [address] Depression; Diabetes mellitus ([facility] [address] GERD (gastroesophageal reflux disease); Neuropathy; and Pneumonia. PLAN   1. Continue Movantik and bowel regimen  2. Medical treatment of sepsis. Does not clinically appear to be related to abdominal source  3. Supportive measures - fluids, O2, pressors as needed  SUBJECTIVE   Isabell Wades is complaining of sweating. Was transferred to ICU due to hypotension/sepsis. + blood cultures, + UA. + wound culture. He denies any nausea or vomiting, has passed flatus or had a bowel movement. He is tolerating a DIET FULL LIQUID;  Dietary Nutrition Supplements: Low Calorie High Protein Supplement  Dietary Nutrition Supplements: Other Oral Supplement (see comment). His pain is well controlled on current medications.     CURRENT MEDICATIONS   Scheduled Meds:   vancomycin  1,750 mg Intravenous Q12H    cefepime  2 g Intravenous Q12H    sodium chloride  250 mL Intravenous Once    diclofenac sodium  2 g Topical BID    enoxaparin  40 mg Subcutaneous Q24H    dexamethasone  1 mg Intravenous Daily    sodium chloride flush  10 mL Intravenous Q12H    pantoprazole  40 mg Oral QAM AC    naloxegol  12.5 mg Oral QAM    insulin lispro  0-12 Units Subcutaneous TID WC    insulin lispro  0-6 Units Subcutaneous Nightly    insulin glargine  36 Units Subcutaneous Daily  lidocaine  2 patch Transdermal Daily    pregabalin  75 mg Oral BID    docusate sodium  100 mg Oral BID    senna  2 tablet Oral Nightly    bisacodyl  10 mg Rectal Daily    baclofen  10 mg Oral TID    polyethylene glycol  17 g Oral Daily    ipratropium-albuterol  1 ampule Inhalation Q4H WA    calcium replacement protocol   Other RX Placeholder    potassium (CARDIAC) replacement protocol   Other RX Placeholder    magnesium replacement protocol   Other RX Placeholder    DULoxetine  60 mg Oral Daily    nystatin  5 mL Oral 4x Daily    bicalutamide  50 mg Oral Daily    Fluticasone Furoate-Vilanterol  1 applicator Inhalation Daily    levothyroxine  50 mcg Oral Daily     Continuous Infusions:   sodium chloride 100 mL/hr at 08/15/18 0415    norepinephrine 2 mcg/min (08/15/18 0110)    dextrose       PRN Meds:.acetaminophen **OR** acetaminophen, sodium chloride flush, traZODone, oxyCODONE-acetaminophen, insulin regular, dextrose, fentanNYL, ondansetron, glucose, dextrose, glucagon (rDNA), dextrose, HYDROcodone-acetaminophen  OBJECTIVE   CURRENT VITALS:  height is 5' 11\" (1.803 m) and weight is 272 lb 0.8 oz (123.4 kg). His oral temperature is 98.5 °F (36.9 °C). His blood pressure is 104/60 and his pulse is 97. His respiration is 21 and oxygen saturation is 98%.    Temperature Range (24h):Temp: 98.5 °F (36.9 °C) Temp  Av.2 °F (37.9 °C)  Min: 98.5 °F (36.9 °C)  Max: 101.9 °F (38.8 °C)  BP Range (95D): Systolic (83EPB), XYE:26 , Min:88 , LVV:487     Diastolic (08HZK), WIR:52, Min:47, Max:62    Pulse Range (24h): Pulse  Av.2  Min: 97  Max: 120  Respiration Range (24h): Resp  Av.8  Min: 17  Max: 23  Current Pulse Ox (24h):  SpO2: 98 %  Pulse Ox Range (24h):  SpO2  Av.9 %  Min: 89 %  Max: 100 %  Oxygen Amount and Delivery: O2 Flow Rate (L/min): 1 L/min  Incentive Spirometry Tx: Respiratory Effort: Dyspnea with Exertion Treatment Tolerance: Fair Incentive Spirometry Goal (mL): 2500 mL (by

## 2018-08-15 NOTE — PROGRESS NOTES
Physical Medicine & Rehabilitation   Progress Note    Chief Complaint:  Acute T8 spinal cord compression due to metastasis and pathologic fracture s/p surgical decompression on 8/3/2018. Rehab needs. Subjective:  Patient seen lying in bed with wife at bedside. He was transferred to ICU last evening for hypotension related to sepsis. Blood cultures positive for gram positive cocci. Infectious disease following. Unsure of infection source at this point, work up continues. He remains on pressors. Therapies have been on hold due to medical condition. TCU precert has been approved, discussed with patient and wife that we will continue to monitor patient and await medical stability, then likely transfer him to TCU. Patient and wife are in agreement. Rehabilitation:  Physical therapy: FIMS:  Bed Mobility:   FIMS:  , PT Equipment Recommendations  Other: cont to assess needs, Assessment: pt tolerated fair, initiated slideboard transfers with 2 assist, performed w/c mobility, pt incontinent of bowel and required cleaned up, pt with extensor tone noted BLE especially with BLE extension in sup, pt with 1 to 2-/5 contraction and right hip ABD/ADD while sitting in w/c, no active movement noted LLE, recommend cont PT    Occupational therapy: FIMS:   ,  , Assessment: Pt demo significant deficits in BLE, as well as tone & surgical pain. Continued OT recommended to educate Pt on compensatory strategies for ADLs & further assess safe t/f technique.        Review of Systems:  CONSTITUTIONAL:  negative  RESPIRATORY:  positive for  dyspnea and oxygen per nasal cannula  CARDIOVASCULAR:  negative  GASTROINTESTINAL:  positive for nausea, abdominal pain and abdominal distention- improved  GENITOURINARY:  negative  MUSCULOSKELETAL:  negative for  myalgias, joint swelling, stiff joints, decreased range of motion and muscle weakness  NEUROLOGICAL:  positive for coordination problems, gait problems, weakness, numbness, pain and

## 2018-08-15 NOTE — PROGRESS NOTES
[facility]  PHYSICAL THERAPY MISSED TREATMENT NOTE  ACUTE CARE    Date: 8/15/2018  Patient Name: Maria G Hallman        MRN: [id number]   : 1955  (61 y.o.)  Gender: male   Referring Practitioner: Dr. Veronica Dance  Referring Practitioner: Dr. Twila Savage  Diagnosis: cord compression  Diagnosis: cord compression         REASON FOR MISSED TREATMENT:  Attempted patient in afternoon for PT session, working with OT at time and then MD coming in shortly to do I&D of wound. Will try back tomorrow if appropriate. [facility]asGood [facility] [id number]

## 2018-08-15 NOTE — PROGRESS NOTES
8/15/2018 5:15 AM    PROCEDURE: XR ABDOMEN (KUB) (SINGLE AP VIEW)  8/13/2018   CLINICAL INFORMATION: ileus; compare with recent series. ,     COMPARISON: 8/12/2018   TECHNIQUE:  AP supine abdomen 4 views       FINDINGS: Scattered loops of gas and stool-filled bowel are seen overlying the abdomen and pelvis. Multiple distended gas-filled bowel is seen overlying the abdomen and pelvis. This may represent a diffuse ileus. However, overall, the degree of gaseous distention appears slightly improved from the prior examination from 8/12/2018. Lumbar fusion surgical hardware is demonstrated along with partial visualization of thoracic fusion hardware. Mesh from prior hernia repair seen overlying the lower leftward abdomen. Multilevel degenerative disc disease of the lumbar spine is demonstrated. There is diffuse osteopenia. There is bilateral hip joint arthrosis.       Impression  1. Multiple distended gas-filled bowel loops are seen overlying the abdomen and pelvis. This involves both large and small bowel loops. This may represent a diffuse ileus. The overall degree of gaseous distention appears slightly improved from the prior   examination. Recommend continued follow-up.     **This report has been created using voice recognition software.  It may contain minor errors which are inherent in voice recognition technology. **     Final report electronically signed by Dr. Penny Norris on 8/13/2018 1:25 PM    PROCEDURE: XR ABDOMEN (KUB) (SINGLE AP VIEW)  8/10/2018   CLINICAL INFORMATION: Follow-up ileus.   COMPARISON: No prior study.   TECHNIQUE: AP supine abdomen performed.      FINDINGS:   POSTOPERATIVE CHANGES:   1. Posterior spinal fusion hardware thoracolumbar spine. 2. Anterior disc space cages lower lumbar spine. 3. Several surgical coils overlying the pelvis.   LINES/TUBES/MECHANICAL DEVICES:   1.  The distal tip of the esophageal tube is within the stomach.   BOWEL GAS PATTERN:  1.  Stable gaseous distention of the colon and gas within several loops of small bowel which in the right clinical setting is consistent with an ileus which is not significantly changed from the previous examination.   LUNG BASES:   1. There is blunting of the left lateral costophrenic angle suggesting a small amount of pleural fluid.   FREE AIR: None.   MASS SHADOWS: None.   PATHOLOGIC CALCIFICATIONS: None.   OSSEOUS STRUCTURES:   1. No acute osseous abnormality. 2. There is generalized osteopenia. 3. Paravertebral ossifications at several levels along the lumbar spine.   OTHER: None.     Impression  1.  Stable gaseous distention of the colon and gas within several loops of small bowel which in the right clinical setting is consistent with an ileus which is not significantly changed from the previous examination.      **This report has been created using voice recognition software.  It may contain minor errors which are inherent in voice recognition technology. **     Final report electronically signed by Dr. Kevin Kumar on 8/10/2018 10:53 AM    PROCEDURE: XR ABDOMEN (KUB) (SINGLE AP VIEW)  CLINICAL INFORMATION: ileus; compare with recent series. ,    COMPARISON: No prior study. TECHNIQUE: 3 supine images    FINDINGS: Diffuse gaseous distention of the colon the colon is dilated to the level of the distal descending colon. There is gas in the rectum. Multiple loops of gas-filled nondilated small bowel are present scattered throughout the abdomen. Hernia mesh   is evident. Lumbar intervertebral spacer devices are present. Impression:    Diffuse ileus with both the gas-filled loops of small bowel and dilated colon. Continued progress imaging suggested. Previously seen NG tube is not identified on this image. **This report has been created using voice recognition software.  It may contain minor errors which are inherent in voice recognition technology. **    Final report electronically signed by Dr. Keron Mulligan on 8/9/2018 8:33 AM    PROCEDURE: XR ABDOMEN (KUB) (SINGLE AP VIEW)  8/8/2018   CLINICAL INFORMATION: Ileus. Recent back surgery. . Abdominal distention.   COMPARISON: 8/7/2018   TECHNIQUE: 3 supine images of the abdomen were obtained.     FINDINGS:   There is moderate gaseous distention of the colon and moderate gaseous distention of a few centrally located small bowel loops, consistent with moderate postoperative ileus. The overall appearance is slightly worsened since yesterday. . Kidneys are somewhat obscured. No definite renal or ureteral calculi are seen. There are a few phleboliths in the pelvis. Vascular clips are present in the pelvis from prior surgery. Metallic rods are present in the lower thoracic spine. Mild left basilar atelectasis/pneumonia.      Impression  1. Mild left basilar atelectasis/pneumonia. 2. Moderate postoperative ileus, slightly worse than yesterday.      **This report has been created using voice recognition software.  It may contain minor errors which are inherent in voice recognition technology. **     Final report electronically signed by Dr. Nelson Valdovinos on 8/8/2018 1:11 PM    PROCEDURE: XR ABDOMEN (KUB) (SINGLE AP VIEW)  8/7/2018   CLINICAL INFORMATION: Constipation.   COMPARISON: No prior study.   TECHNIQUE: 3 AP supine views of the abdomen performed.      FINDINGS:   POSTOPERATIVE CHANGES:   1. Partially imaged posterior spinal fixation hardware lower thoracic spine. 2. Disc space cages at L3-4, L4-5 and L5-S1.  3. Surgical clips overlie the inferior aspect of the mid pelvis and the medial aspect of the proximal right thigh.   LINES/TUBES/MECHANICAL DEVICES: None.   BOWEL GAS PATTERN:  1.  There is mild gaseous distention of the colon and gas within several nondistended loops of small bowel which in the right clinical setting could represent an ileus.  There is a small amount stool within the ascending colon.   LUNG BASES:   1. Not included within the field of imaging.   FREE AIR: None.   MASS SHADOWS: ileus due to spinal surgery, immobility , pain medication and cord compression and neurological changes due to spinal cord injury. Colostomy or ileostomy will be beneficial to the patient at this time to treat his wound infection , positioning and let him get god nutrition. I do not thick decfection function going to improve soon .      Patient is seen independently from the nurse practitioner and all  the pertinent data along with physical examination and assessment and plans are all obtained by my self and  Laboratory data, Radiology results, medications all are reviewed by my self and care is discussed extensively with the patient  and the patients nurse and all agree with plan and in addition see orders and plans    Electronically signed by [place] MD

## 2018-08-15 NOTE — PROGRESS NOTES
[address] [address] THERAPY  STRZ ICU 4D  DAILY NOTE    Time:  Time In: 5046  Time Out: 1340  Timed Code Treatment Minutes: 25 Minutes  Minutes: 25          Date: 8/15/2018  Patient Name: Trista Quiroga,   Gender: male      Room: 4D17/017-A  MRN: [id number]  : 1955  (61 y.o.)  Referring Practitioner: Dr. Benitez Schofield  Diagnosis: cord compression  Additional Pertinent Hx: Per ER note on 18:  61 y.o. male who presents to Emergency Department with gradually worsening leg weakness and lumbar back pain. Patient has history of prostates cancer diagnosis in  with a prostatectomy performed at [facility] and proceeded to receive radiation. The patient presented to [facility] ED for gradually worsening lumbar back pain and intermittent bilateral leg weakness where he was treated with Valium and morphine. Patient was discharged the same day and consulted with his PCP. His PCP became concerned his symptoms were metastatic and ordered a bone scan on 18 where patient had presence of T7 metastasis. s/p: S/P  bilateral pedicle screws in T5, T6, T7, T10 and T11 + posterior decompression at T8-T9 on 8/3/18 Per Onocology note: He had findings of metastasis of the C7 vertebra, multiple thoracic vertebra and lumbar vertebra metastasis.      Past Medical History:   Diagnosis Date    Anxiety     Arthritis     Asthma     Cancer ([facility] [address]     prostate    COPD (chronic obstructive pulmonary disease) ([facility] [address]     Depression     Diabetes mellitus (HCC)     GERD (gastroesophageal reflux disease)     Neuropathy     Pneumonia      Past Surgical History:   Procedure Laterality Date    CARPAL TUNNEL RELEASE Bilateral     CERVICAL DISCECTOMY      COLONOSCOPY      ENDOSCOPY, COLON, DIAGNOSTIC      EYE SURGERY      HERNIA REPAIR      LUMBAR SPINE SURGERY      NASAL SEPTUM SURGERY      DE OFFICE/OUTPT VISIT,PROCEDURE ONLY N/A 2018    TRANSPEDICULAR VERTEBRECTOMY AND CAGE INSERTION OF T8 WITH POSTERIOR INSTRUMENTATION OF THORACIC SPINE AND TUMOR REMOVAL performed by Galen Gonzales MD at [address] [facility] OFFICE/OUTPT VISIT,PROCEDURE ONLY Left 8/11/2018    COLONOSCOPY performed by Michele Jaquez MD at [address] [facility]    [place] OFFICE/OUTPT [address] Left 8/12/2018    COLONOSCOPY performed by Michele Jaquez MD at [address]         Restrictions/Precautions:  Fall Risk         Spinal Precautions: No Bending, No Lifting, No Twisting  Other position/activity restrictions: paraplegia with extensor spasticity at times       Prior Level of Function:  ADL Assistance: Independent  Homemaking Assistance: Independent  Ambulation Assistance: Independent  Transfer Assistance: Independent  Additional Comments: Pt reports he was ambulating without AD PLOF. Pt reports pain, numbness, & unsteadiness with mobility at home since April 2018. Subjective       Subjective: Nurse reports Pt was t/f to ICU for sepsis the previous day. Pt motivated to try, fatigued quickly              Pain:  Pain Assessment  Patient Currently in Pain: No (no c/o pain during session)       Objective  Overall Cognitive Status: WNL            Comment: Completed B shoulder flexion, horizontal add/abuction x 10 reps with 1# weight. Complete B elbow flexion x 10 reps with 2# weight. Completed while in supine. Therapist educated Pt on back precautions during session.  Session was cut short d/t MD present for wound I & D Activity Tolerance:  Activity Tolerance: Patient limited by fatigue    Assessment:     Performance deficits / Impairments: Decreased functional mobility , Decreased ADL status, Decreased sensation, Decreased endurance, Decreased balance, Decreased strength, Decreased high-level IADLs  Prognosis: Fair  Discharge Recommendations: IP Rehab    Patient Education:  Patient Education: slide board transfer  Barriers to Learning: none    Equipment Recommendations: Other: Continue to assess pending progress    Safety:  Safety Devices in place: Yes  Type of devices:  All fall risk precautions in place, Call light within reach    Plan:  Times per week: 6x  Current Treatment Recommendations: Functional Mobility Training, Safety Education & Training, Self-Care / ADL, Balance Training, Endurance Training, Strengthening    Goals:  Patient goals : go home    Short term goals  Time Frame for Short term goals: 2 weeks  Short term goal 1: Complete BUE light strengthening HEP with min RBs to increase UB endurance to A with t/fs  Short term goal 2: Pt to tolerate sitting at EOB with no greater than CGA for greater than 10 mins and 1-2 UE release for increased ease with ADL tasks  Short term goal 3: Tolerate further assessment of t/fs by OTR when appropriate- GOAL REVISED   Long term goals  Time Frame for Long term goals : No LTG set d/t short ELOS

## 2018-08-15 NOTE — CONSULTS
spoke with his wife and her friend, who were in the room. The patient is complaining of back pain where he had the surgery. He  reports that it is new. He has elevated PSA level consistent with the  suspicion of metastatic disease. He has also seen oncologist.  The patient  has not been treated for his active cancer. PAST MEDICAL HISTORY:  Significant for diabetes, prostate cancer, has  history of prostatectomy, chronic back pain, asthma, and COPD. After he  came to hospital following surgery, he was admitted to ICU for acute  respiratory failure. He has paraplegia due to compression involving the  T7-T8 fracture. Meds, had been on high-dose steroid. He had history of  diabetes, hypothyroidism, morbid obesity, and obstructive sleep apnea. He  had leukocytosis on and off since he had the surgery. He had history of  ureteral stricture following the prostate surgery and required Khan  catheter. ALLERGIES:  He has allergies to TORADOL, TROMETHAMINE, and CRESTOR. CURRENT MEDICATIONS:  Include baclofen, Casodex, chemotherapeutic agent,  bisacodyl. He is on cefepime, dexamethasone, diclofenac, Colace,  duloxetine, Lovenox, fentanyl, fluticasone, Norco, insulin, levothyroxine,  lidocaine, nystatin, Protonix, polyethylene glycol, pregabalin, and  vancomycin. REVIEW OF SYSTEMS:  He reports of pain on his back. He has cough. No  chest pain. He has no sensation below his breasts. He had some tingling  pain on his legs. No spontaneous movement was noted. PHYSICAL EXAMINATION:  GENERAL:  He looks very sick. VITAL SIGNS:  Temperature 100.2, respirations 20, pulse 107, and blood  pressure 90/53. HEENT:  He has pink conjunctivae. Anicteric sclerae. CHEST:  Bilateral air entry. CARDIOVASCULAR SYSTEM:  Regular. ABDOMEN:  Distended. Bowel sound heard. EXTREMITIES:  No edema. He is paraplegic. BACK:  Examination of his thoracic spine shows intact surgical scar.   There  was serous fluid soaking the dressing. The area was boggy. Feels there is  some fluid. No purulent drainage was noted. The skin has bruising. DIAGNOSTIC DATA:  He has WBC of  27.2, hemoglobin 9.6, hematocrit 29.4,  platelets of 785. Sodium 132, potassium 3.8, chloride 92, bicarbonate 24,  BUN 24, and creatinine 0.6. Lactic acid was 1.8. IMPRESSION:  He is a 60-year-old male patient who is paraplegic secondary  to cord compression from tumor involving T7 and T8 with fracture pathology. Has leukocytosis, slightly related to the steroid he has been getting. Unfortunately, he has associated fever, low blood pressure concerning for  infection. His central line has been removed. Cultures have been sent. He was empirically started on antibiotics. The patient today is  complaining of new-onset pain on his spine. We will scan the area to make  sure there is not any fluid collection/abscess which may be contributing to the fever and pain ( he had long operation which is a risk factor for infection. We will continue to follow the patient. I had a lengthy discussion with his wife about the treatment plan.         Ubaldo Marques M.D.    D: 08/14/2018 16:24:41       T: 08/14/2018 20:18:31     [email or web address]  Job#: [id number]     Doc#: [id number]    CC:

## 2018-08-15 NOTE — PLAN OF CARE
resulted. Patient currently on antibiotics. Patient remains on levophed for blood pressure support. Problem: Infection - Central Venous Catheter-Associated Bloodstream Infection:  Goal: Will show no infection signs and symptoms  Will show no infection signs and symptoms    Outcome: Not Met This Shift  Positive blood cultures x2 resulted. Dr. Kristi Isaacs notified. Patient continues on antibiotics as ordered. Problem: Nutrition  Goal: Optimal nutrition therapy  Outcome: Not Met This Shift  Patient continues to report decreased appetite. Problem: Skin Integrity:  Goal: Absence of new skin breakdown  Absence of new skin breakdown   Outcome: Ongoing  No evidence of new skin breakdown. Continue to monitor. Goal: Will show no infection signs and symptoms  Will show no infection signs and symptoms    Outcome: Not Met This Shift  Positive blood cultures x2 resulted. Dr. Kristi Isaacs notified. Patient continues on antibiotics as ordered. Problem: Neurological  Goal: Maximum potential motor/sensory/cognitive function  Outcome: Ongoing  Patient continues to report decreased sensation in BLE. Spontaneous movement of BLE. Problem: DISCHARGE BARRIERS  Goal: Patient's continuum of care needs are met  Outcome: Ongoing  Care needs continue to be assessed. Comments: Care plan reviewed with patient. Patient verbalizes understanding of the plan of care and contributes to goal setting.

## 2018-08-16 ENCOUNTER — APPOINTMENT (OUTPATIENT)
Dept: GENERAL RADIOLOGY | Age: 63
DRG: 518 | End: 2018-08-16
Payer: MEDICARE

## 2018-08-16 LAB
AEROBIC CULTURE: ABNORMAL
AEROBIC CULTURE: ABNORMAL
ALBUMIN SERPL-MCNC: 1.8 G/DL (ref 3.5–5.1)
ALP BLD-CCNC: 130 U/L (ref 38–126)
ALT SERPL-CCNC: 12 U/L (ref 11–66)
ANION GAP SERPL CALCULATED.3IONS-SCNC: 13 MEQ/L (ref 8–16)
AST SERPL-CCNC: 12 U/L (ref 5–40)
BASOPHILS # BLD: 0 %
BASOPHILS ABSOLUTE: 0 THOU/MM3 (ref 0–0.1)
BILIRUB SERPL-MCNC: 0.8 MG/DL (ref 0.3–1.2)
BILIRUBIN DIRECT: 0.4 MG/DL (ref 0–0.3)
BLOOD CULTURE, ROUTINE: ABNORMAL
BUN BLDV-MCNC: 24 MG/DL (ref 7–22)
CALCIUM SERPL-MCNC: 8.1 MG/DL (ref 8.5–10.5)
CHLORIDE BLD-SCNC: 96 MEQ/L (ref 98–111)
CO2: 23 MEQ/L (ref 23–33)
CREAT SERPL-MCNC: 0.7 MG/DL (ref 0.4–1.2)
DOHLE BODIES: PRESENT
EKG ATRIAL RATE: 84 BPM
EKG ATRIAL RATE: 95 BPM
EKG ATRIAL RATE: 97 BPM
EKG P AXIS: 38 DEGREES
EKG P AXIS: 44 DEGREES
EKG P AXIS: 61 DEGREES
EKG P-R INTERVAL: 106 MS
EKG P-R INTERVAL: 110 MS
EKG P-R INTERVAL: 110 MS
EKG Q-T INTERVAL: 330 MS
EKG Q-T INTERVAL: 342 MS
EKG Q-T INTERVAL: 366 MS
EKG QRS DURATION: 104 MS
EKG QRS DURATION: 98 MS
EKG QRS DURATION: 98 MS
EKG QTC CALCULATION (BAZETT): 419 MS
EKG QTC CALCULATION (BAZETT): 429 MS
EKG QTC CALCULATION (BAZETT): 432 MS
EKG R AXIS: 33 DEGREES
EKG R AXIS: 58 DEGREES
EKG R AXIS: 64 DEGREES
EKG T AXIS: 0 DEGREES
EKG T AXIS: 31 DEGREES
EKG T AXIS: 47 DEGREES
EKG VENTRICULAR RATE: 84 BPM
EKG VENTRICULAR RATE: 95 BPM
EKG VENTRICULAR RATE: 97 BPM
EOSINOPHIL # BLD: 2 %
EOSINOPHILS ABSOLUTE: 0.2 THOU/MM3 (ref 0–0.4)
ERYTHROCYTE [DISTWIDTH] IN BLOOD BY AUTOMATED COUNT: 14.8 % (ref 11.5–14.5)
ERYTHROCYTE [DISTWIDTH] IN BLOOD BY AUTOMATED COUNT: 50.8 FL (ref 35–45)
GFR SERPL CREATININE-BSD FRML MDRD: > 90 ML/MIN/1.73M2
GLUCOSE BLD-MCNC: 110 MG/DL (ref 70–108)
GLUCOSE BLD-MCNC: 128 MG/DL (ref 70–108)
GLUCOSE BLD-MCNC: 136 MG/DL (ref 70–108)
GLUCOSE BLD-MCNC: 137 MG/DL (ref 70–108)
GLUCOSE BLD-MCNC: 76 MG/DL (ref 70–108)
GLUCOSE BLD-MCNC: 91 MG/DL (ref 70–108)
GRAM STAIN RESULT: ABNORMAL
HCT VFR BLD CALC: 28.8 % (ref 42–52)
HEMOGLOBIN: 8.9 GM/DL (ref 14–18)
LIPASE: 13 U/L (ref 5.6–51.3)
LYMPHOCYTES # BLD: 1 %
LYMPHOCYTES ABSOLUTE: 0.1 THOU/MM3 (ref 1–4.8)
MCH RBC QN AUTO: 30 PG (ref 26–33)
MCHC RBC AUTO-ENTMCNC: 30.9 GM/DL (ref 32.2–35.5)
MCV RBC AUTO: 97 FL (ref 80–94)
MONOCYTES # BLD: 3 %
MONOCYTES ABSOLUTE: 0.3 THOU/MM3 (ref 0.4–1.3)
MRSA SCREEN: NORMAL
NUCLEATED RED BLOOD CELLS: 0 /100 WBC
ORGANISM: ABNORMAL
PLATELET # BLD: 237 THOU/MM3 (ref 130–400)
PLATELET ESTIMATE: ADEQUATE
PMV BLD AUTO: 9.8 FL (ref 9.4–12.4)
POTASSIUM SERPL-SCNC: 4.1 MEQ/L (ref 3.5–5.2)
RBC # BLD: 2.97 MILL/MM3 (ref 4.7–6.1)
SEG NEUTROPHILS: 94 %
SEGMENTED NEUTROPHILS ABSOLUTE COUNT: 8.3 THOU/MM3 (ref 1.8–7.7)
SODIUM BLD-SCNC: 132 MEQ/L (ref 135–145)
TOTAL PROTEIN: 5.5 G/DL (ref 6.1–8)
TOXIC GRANULATION: PRESENT
TROPONIN T: < 0.01 NG/ML
URINE CULTURE REFLEX: ABNORMAL
URINE CULTURE REFLEX: ABNORMAL
URINE CULTURE, ROUTINE: NORMAL
VRE CULTURE: NORMAL
WBC # BLD: 8.8 THOU/MM3 (ref 4.8–10.8)

## 2018-08-16 PROCEDURE — 6370000000 HC RX 637 (ALT 250 FOR IP): Performed by: INTERNAL MEDICINE

## 2018-08-16 PROCEDURE — A4212 NON CORING NEEDLE OR STYLET: HCPCS

## 2018-08-16 PROCEDURE — 36569 INSJ PICC 5 YR+ W/O IMAGING: CPT

## 2018-08-16 PROCEDURE — 6360000002 HC RX W HCPCS: Performed by: INTERNAL MEDICINE

## 2018-08-16 PROCEDURE — 2580000003 HC RX 258: Performed by: INTERNAL MEDICINE

## 2018-08-16 PROCEDURE — 76937 US GUIDE VASCULAR ACCESS: CPT

## 2018-08-16 PROCEDURE — 85025 COMPLETE CBC W/AUTO DIFF WBC: CPT

## 2018-08-16 PROCEDURE — 6360000002 HC RX W HCPCS: Performed by: SURGERY

## 2018-08-16 PROCEDURE — 6370000000 HC RX 637 (ALT 250 FOR IP): Performed by: NURSE PRACTITIONER

## 2018-08-16 PROCEDURE — 99024 POSTOP FOLLOW-UP VISIT: CPT | Performed by: NEUROLOGICAL SURGERY

## 2018-08-16 PROCEDURE — 99232 SBSQ HOSP IP/OBS MODERATE 35: CPT | Performed by: SURGERY

## 2018-08-16 PROCEDURE — 36592 COLLECT BLOOD FROM PICC: CPT

## 2018-08-16 PROCEDURE — 80053 COMPREHEN METABOLIC PANEL: CPT

## 2018-08-16 PROCEDURE — 93005 ELECTROCARDIOGRAM TRACING: CPT | Performed by: INTERNAL MEDICINE

## 2018-08-16 PROCEDURE — 6370000000 HC RX 637 (ALT 250 FOR IP): Performed by: PHYSICIAN ASSISTANT

## 2018-08-16 PROCEDURE — 6370000000 HC RX 637 (ALT 250 FOR IP): Performed by: SURGERY

## 2018-08-16 PROCEDURE — 6360000002 HC RX W HCPCS: Performed by: FAMILY MEDICINE

## 2018-08-16 PROCEDURE — 84484 ASSAY OF TROPONIN QUANT: CPT

## 2018-08-16 PROCEDURE — 93010 ELECTROCARDIOGRAM REPORT: CPT | Performed by: NUCLEAR MEDICINE

## 2018-08-16 PROCEDURE — 99232 SBSQ HOSP IP/OBS MODERATE 35: CPT | Performed by: NURSE PRACTITIONER

## 2018-08-16 PROCEDURE — 82248 BILIRUBIN DIRECT: CPT

## 2018-08-16 PROCEDURE — 6370000000 HC RX 637 (ALT 250 FOR IP): Performed by: PHYSICAL MEDICINE & REHABILITATION

## 2018-08-16 PROCEDURE — 36415 COLL VENOUS BLD VENIPUNCTURE: CPT

## 2018-08-16 PROCEDURE — 74250 X-RAY XM SM INT 1CNTRST STD: CPT

## 2018-08-16 PROCEDURE — C1751 CATH, INF, PER/CENT/MIDLINE: HCPCS

## 2018-08-16 PROCEDURE — 82948 REAGENT STRIP/BLOOD GLUCOSE: CPT

## 2018-08-16 PROCEDURE — 99223 1ST HOSP IP/OBS HIGH 75: CPT | Performed by: INTERNAL MEDICINE

## 2018-08-16 PROCEDURE — 2000000000 HC ICU R&B

## 2018-08-16 PROCEDURE — 05HY33Z INSERTION OF INFUSION DEVICE INTO UPPER VEIN, PERCUTANEOUS APPROACH: ICD-10-PCS | Performed by: INTERNAL MEDICINE

## 2018-08-16 PROCEDURE — 71045 X-RAY EXAM CHEST 1 VIEW: CPT

## 2018-08-16 PROCEDURE — 2500000003 HC RX 250 WO HCPCS: Performed by: NURSE PRACTITIONER

## 2018-08-16 PROCEDURE — 2709999900 HC NON-CHARGEABLE SUPPLY

## 2018-08-16 PROCEDURE — 94640 AIRWAY INHALATION TREATMENT: CPT

## 2018-08-16 PROCEDURE — 97530 THERAPEUTIC ACTIVITIES: CPT

## 2018-08-16 PROCEDURE — 83690 ASSAY OF LIPASE: CPT

## 2018-08-16 PROCEDURE — 2700000000 HC OXYGEN THERAPY PER DAY

## 2018-08-16 RX ORDER — INSULIN GLARGINE 100 [IU]/ML
10 INJECTION, SOLUTION SUBCUTANEOUS DAILY
Status: DISCONTINUED | OUTPATIENT
Start: 2018-08-17 | End: 2018-08-17

## 2018-08-16 RX ORDER — NEOSTIGMINE METHYLSULFATE 1 MG/ML
1 INJECTION, SOLUTION INTRAVENOUS EVERY 6 HOURS
Status: DISCONTINUED | OUTPATIENT
Start: 2018-08-16 | End: 2018-08-18 | Stop reason: ALTCHOICE

## 2018-08-16 RX ORDER — MORPHINE SULFATE 2 MG/ML
1 INJECTION, SOLUTION INTRAMUSCULAR; INTRAVENOUS ONCE
Status: COMPLETED | OUTPATIENT
Start: 2018-08-16 | End: 2018-08-16

## 2018-08-16 RX ORDER — MORPHINE SULFATE 2 MG/ML
1 INJECTION, SOLUTION INTRAMUSCULAR; INTRAVENOUS EVERY 4 HOURS PRN
Status: DISCONTINUED | OUTPATIENT
Start: 2018-08-16 | End: 2018-08-17

## 2018-08-16 RX ADMIN — PREGABALIN 75 MG: 75 CAPSULE ORAL at 08:47

## 2018-08-16 RX ADMIN — DEXTROSE MONOHYDRATE 12.5 G: 25 INJECTION, SOLUTION INTRAVENOUS at 17:31

## 2018-08-16 RX ADMIN — BACLOFEN 10 MG: 10 TABLET ORAL at 16:05

## 2018-08-16 RX ADMIN — BARIUM SULFATE 140 ML: 240 SUSPENSION ORAL at 11:25

## 2018-08-16 RX ADMIN — OXYCODONE HYDROCHLORIDE AND ACETAMINOPHEN 1 TABLET: 5; 325 TABLET ORAL at 13:47

## 2018-08-16 RX ADMIN — NYSTATIN 500000 UNITS: 100000 SUSPENSION ORAL at 08:59

## 2018-08-16 RX ADMIN — Medication 10 ML: at 03:44

## 2018-08-16 RX ADMIN — NYSTATIN 500000 UNITS: 100000 SUSPENSION ORAL at 21:25

## 2018-08-16 RX ADMIN — IPRATROPIUM BROMIDE AND ALBUTEROL SULFATE 1 AMPULE: .5; 3 SOLUTION RESPIRATORY (INHALATION) at 12:52

## 2018-08-16 RX ADMIN — ENOXAPARIN SODIUM 40 MG: 40 INJECTION SUBCUTANEOUS at 21:25

## 2018-08-16 RX ADMIN — SODIUM CHLORIDE: 9 INJECTION, SOLUTION INTRAVENOUS at 02:13

## 2018-08-16 RX ADMIN — PANTOPRAZOLE SODIUM 40 MG: 40 TABLET, DELAYED RELEASE ORAL at 06:18

## 2018-08-16 RX ADMIN — Medication 10 ML: at 21:26

## 2018-08-16 RX ADMIN — MORPHINE SULFATE 1 MG: 2 INJECTION, SOLUTION INTRAMUSCULAR; INTRAVENOUS at 16:47

## 2018-08-16 RX ADMIN — MORPHINE SULFATE 1 MG: 2 INJECTION, SOLUTION INTRAMUSCULAR; INTRAVENOUS at 21:25

## 2018-08-16 RX ADMIN — OXYCODONE HYDROCHLORIDE AND ACETAMINOPHEN 1 TABLET: 5; 325 TABLET ORAL at 08:47

## 2018-08-16 RX ADMIN — DULOXETINE HYDROCHLORIDE 60 MG: 60 CAPSULE, DELAYED RELEASE ORAL at 08:58

## 2018-08-16 RX ADMIN — Medication 10 ML: at 09:00

## 2018-08-16 RX ADMIN — Medication 2 G: at 16:05

## 2018-08-16 RX ADMIN — ONDANSETRON 4 MG: 2 INJECTION INTRAMUSCULAR; INTRAVENOUS at 13:52

## 2018-08-16 RX ADMIN — IPRATROPIUM BROMIDE AND ALBUTEROL SULFATE 1 AMPULE: .5; 3 SOLUTION RESPIRATORY (INHALATION) at 21:53

## 2018-08-16 RX ADMIN — BACLOFEN 10 MG: 10 TABLET ORAL at 08:58

## 2018-08-16 RX ADMIN — POLYETHYLENE GLYCOL 3350 17 G: 17 POWDER, FOR SOLUTION ORAL at 08:48

## 2018-08-16 RX ADMIN — DAKIN'S SOLUTION 0.125% (QUARTER STRENGTH): 0.12 SOLUTION at 18:06

## 2018-08-16 RX ADMIN — PREGABALIN 75 MG: 75 CAPSULE ORAL at 21:27

## 2018-08-16 RX ADMIN — IPRATROPIUM BROMIDE AND ALBUTEROL SULFATE 1 AMPULE: .5; 3 SOLUTION RESPIRATORY (INHALATION) at 08:45

## 2018-08-16 RX ADMIN — Medication 10 ML: at 01:22

## 2018-08-16 RX ADMIN — Medication 2 G: at 06:18

## 2018-08-16 RX ADMIN — DOCUSATE SODIUM 100 MG: 100 CAPSULE, LIQUID FILLED ORAL at 08:47

## 2018-08-16 RX ADMIN — IPRATROPIUM BROMIDE AND ALBUTEROL SULFATE 1 AMPULE: .5; 3 SOLUTION RESPIRATORY (INHALATION) at 16:57

## 2018-08-16 RX ADMIN — MORPHINE SULFATE 1 MG: 2 INJECTION, SOLUTION INTRAMUSCULAR; INTRAVENOUS at 01:22

## 2018-08-16 RX ADMIN — MORPHINE SULFATE 1 MG: 2 INJECTION, SOLUTION INTRAMUSCULAR; INTRAVENOUS at 05:43

## 2018-08-16 RX ADMIN — DICLOFENAC 2 G: 10 GEL TOPICAL at 21:26

## 2018-08-16 RX ADMIN — NEOSTIGMINE METHYLSULFATE 1 MG: 1 INJECTION, SOLUTION INTRAVENOUS at 12:43

## 2018-08-16 RX ADMIN — NEOSTIGMINE METHYLSULFATE 1 MG: 1 INJECTION, SOLUTION INTRAVENOUS at 21:27

## 2018-08-16 RX ADMIN — BISACODYL 10 MG: 10 SUPPOSITORY RECTAL at 10:20

## 2018-08-16 RX ADMIN — ACETAMINOPHEN 650 MG: 325 TABLET ORAL at 00:37

## 2018-08-16 RX ADMIN — BACLOFEN 10 MG: 10 TABLET ORAL at 21:25

## 2018-08-16 RX ADMIN — DICLOFENAC 2 G: 10 GEL TOPICAL at 10:16

## 2018-08-16 RX ADMIN — DOCUSATE SODIUM 100 MG: 100 CAPSULE, LIQUID FILLED ORAL at 21:46

## 2018-08-16 RX ADMIN — FENTANYL CITRATE 50 MCG: 50 INJECTION INTRAMUSCULAR; INTRAVENOUS at 03:43

## 2018-08-16 RX ADMIN — Medication 2 G: at 22:07

## 2018-08-16 RX ADMIN — NALOXEGOL OXALATE 12.5 MG: 12.5 TABLET, FILM COATED ORAL at 06:18

## 2018-08-16 RX ADMIN — SENNOSIDES 17.2 MG: 8.6 TABLET, FILM COATED ORAL at 21:25

## 2018-08-16 RX ADMIN — BICALUTAMIDE 50 MG: 50 TABLET, FILM COATED ORAL at 08:59

## 2018-08-16 RX ADMIN — LEVOTHYROXINE SODIUM 50 MCG: 50 TABLET ORAL at 06:18

## 2018-08-16 ASSESSMENT — PAIN SCALES - GENERAL
PAINLEVEL_OUTOF10: 8
PAINLEVEL_OUTOF10: 7
PAINLEVEL_OUTOF10: 2
PAINLEVEL_OUTOF10: 10
PAINLEVEL_OUTOF10: 7
PAINLEVEL_OUTOF10: 7
PAINLEVEL_OUTOF10: 10
PAINLEVEL_OUTOF10: 10
PAINLEVEL_OUTOF10: 8
PAINLEVEL_OUTOF10: 7
PAINLEVEL_OUTOF10: 10
PAINLEVEL_OUTOF10: 7
PAINLEVEL_OUTOF10: 8
PAINLEVEL_OUTOF10: 7
PAINLEVEL_OUTOF10: 8

## 2018-08-16 ASSESSMENT — PAIN DESCRIPTION - ORIENTATION
ORIENTATION: MID
ORIENTATION: RIGHT

## 2018-08-16 ASSESSMENT — PAIN DESCRIPTION - FREQUENCY
FREQUENCY: CONTINUOUS
FREQUENCY: CONTINUOUS

## 2018-08-16 ASSESSMENT — PAIN DESCRIPTION - PAIN TYPE
TYPE: ACUTE PAIN
TYPE: ACUTE PAIN

## 2018-08-16 ASSESSMENT — PAIN DESCRIPTION - DIRECTION: RADIATING_TOWARDS: LEFT ARM AND JAW

## 2018-08-16 ASSESSMENT — PAIN DESCRIPTION - DESCRIPTORS
DESCRIPTORS: SHARP
DESCRIPTORS: CRUSHING

## 2018-08-16 ASSESSMENT — PAIN DESCRIPTION - LOCATION
LOCATION: CHEST
LOCATION: CHEST
LOCATION: ABDOMEN

## 2018-08-16 ASSESSMENT — PAIN DESCRIPTION - ONSET: ONSET: AWAKENED FROM SLEEP

## 2018-08-16 NOTE — PROGRESS NOTES
[facility]  Daily Progress Note  Pt Name: Yolanda York  Medical Record Number: [id number]  Date of Birth 1955   Today's Date: 8/16/2018  Chief complaint: frustrated  ASSESSMENT   1. Hospital day # 15   2. Paralytic ileus following a lengthy back surgery for metastatic prostate cancer  Leukocytosis - clinically does not appear to be of abdominal origin - UTI confirmed, Bacteremia, thoracic wound infection    has a past medical history of Anxiety; Arthritis; Asthma; Cancer ([facility] [address] COPD (chronic obstructive pulmonary disease) ([facility] [address] Depression; Diabetes mellitus ([facility] [address] GERD (gastroesophageal reflux disease); Neuropathy; and Pneumonia. PLAN   1. Continue Movantik and bowel regimen, restart prostigmin therapy  2. Gi has ordered a SBFT. Likely to have an extended transit time due to ileus and require am films despite starting test today. Further dilation is predominately colon. Ileus has basically reset from recent sepsis. Restart medical therapy above that provided improvement. 3. Would reserve enteric diversion for signs of ischemia or perforation. 4. Need to address nutritional status, has been over 7 days since adequate protein intake. ?TPN  SUBJECTIVE   [place] is complaining of being frustrated at his lack of progress and setbacks. Now off pressors. Back wound opened yesterday with large amounts of pustulant output. He does state his abdomen is larger again, passed very little flatus, no BM. He is tolerating a Diet NPO Time Specified Exceptions are: Sips with Meds, Ice Chips. His pain is well controlled on current medications.     CURRENT MEDICATIONS   Scheduled Meds:   neostigmine  1 mg Intramuscular Q6H    lidocaine 1 % injection  5 mL Intradermal Once    sodium chloride flush  10 mL Intravenous 2 times per day    ceFAZolin  2 g Intravenous Q8H    sodium hypochlorite   Irrigation Daily    sodium chloride  250 mL Intravenous Once    diclofenac

## 2018-08-16 NOTE — PLAN OF CARE
Problem: Falls - Risk of:  Goal: Will remain free from falls  Will remain free from falls   Outcome: Ongoing  Fall prevention measures in place and explained to patient. Call light within reach, bed in low position, bed alarm on. No falls at this time. Goal: Absence of physical injury  Absence of physical injury   Outcome: Ongoing  Safety measures remain in place and explained to patient. No evidence of physical injury at this time. Problem: Pain:  Goal: Pain level will decrease  Pain level will decrease   Outcome: Ongoing  Patient continues to report chest pain. Pain medication given per orders. Continue to monitor. Goal: Control of acute pain  Control of acute pain   Outcome: Ongoing  Pain medication given per order. Continue to monitor. Goal: Control of chronic pain  Control of chronic pain   Outcome: Ongoing      Problem: Risk for Impaired Skin Integrity  Goal: Tissue integrity - skin and mucous membranes  Structural intactness and normal physiological function of skin and  mucous membranes. Outcome: Ongoing  Areas of impaired skin integrity in various stages of healing. Patient turned Q2H and as needed. Heels and arms elevated off bed on pillows. Skin prep barrier film applied to areas of redness. Wounds cleansed per orders. Problem: Discharge Planning:  Goal: Discharged to appropriate level of care  Discharged to appropriate level of care   Outcome: Not Met This Shift  Patient continues to require ICU level care. Discharge planning continues. Problem: Cardiac Output - Decreased:  Goal: Hemodynamic stability will improve  Hemodynamic stability will improve   Outcome: Ongoing  Patient remains on small amount of levophed to maintain pressures at goal. Continue to monitor.      Problem: Urinary Elimination:  Goal: Signs and symptoms of infection will decrease  Signs and symptoms of infection will decrease   Outcome: Ongoing  WBC within normal limits this AM. Back wound continues to have

## 2018-08-16 NOTE — PROGRESS NOTES
IM Progress Note  Dr. Nivia Burns  8/16/2018 7:48 AM      Patient name Terra White  [id number]  PCP: Netta Ny MD  Admit Date: 8/1/2018  Acct No. [de-identified]    Subjective: Interval History:   Just off pressors earlier  Had CP last night   Trop and EKG reviewed  Pain now almost gone   He pointed out to his pain earlier was at the epigastrium and radiating to his Rt UQ          Diet: Diet NPO Time Specified Exceptions are: Sips with Meds, Ice Chips    I/O last 3 completed shifts: In: 0252 [P.O.:520; I.V.:2881]  Out: 3750 [Urine:3750]  No intake/output data recorded. Admission weight: 279 lb (126.6 kg) as of 8/1/2018  5:04 PM  Wt Readings from Last 3 Encounters:   08/15/18 272 lb 0.8 oz (123.4 kg)     Body mass index is 37.94 kg/m².           Medications:   Scheduled Meds:   lidocaine 1 % injection  5 mL Intradermal Once    sodium chloride flush  10 mL Intravenous 2 times per day    ceFAZolin  2 g Intravenous Q8H    sodium hypochlorite   Irrigation Daily    sodium chloride  250 mL Intravenous Once    diclofenac sodium  2 g Topical BID    enoxaparin  40 mg Subcutaneous Q24H    dexamethasone  1 mg Intravenous Daily    sodium chloride flush  10 mL Intravenous Q12H    pantoprazole  40 mg Oral QAM AC    naloxegol  12.5 mg Oral QAM    insulin lispro  0-12 Units Subcutaneous TID WC    insulin lispro  0-6 Units Subcutaneous Nightly    insulin glargine  36 Units Subcutaneous Daily    lidocaine  2 patch Transdermal Daily    pregabalin  75 mg Oral BID    docusate sodium  100 mg Oral BID    senna  2 tablet Oral Nightly    bisacodyl  10 mg Rectal Daily    baclofen  10 mg Oral TID    polyethylene glycol  17 g Oral Daily    ipratropium-albuterol  1 ampule Inhalation Q4H WA    calcium replacement protocol   Other RX Placeholder    potassium (CARDIAC) replacement protocol   Other RX Placeholder    magnesium replacement protocol   Other RX Placeholder    DULoxetine  60 mg Oral Daily    nystatin  5 mL Oral 4x Daily    bicalutamide  50 mg Oral Daily    Fluticasone Furoate-Vilanterol  1 applicator Inhalation Daily    levothyroxine  50 mcg Oral Daily     Continuous Infusions:   sodium chloride 100 mL/hr at 08/16/18 0213    norepinephrine Stopped (08/16/18 0650)    dextrose         Labs :     CBC:   Recent Labs      08/14/18   0535  08/15/18   0925  08/16/18   0436   WBC  27.2*  14.5*  8.8   HGB  9.6*  8.2*  8.9*   PLT  290  268  237     BMP:    Recent Labs      08/14/18   0535  08/15/18   0925  08/16/18   0436   NA  132*  129*  132*   K  3.8  3.8  4.1   CL  92*  94*  96*   CO2  24  23  23   BUN  24*  27*  24*   CREATININE  0.9  0.8  0.7   GLUCOSE  128*  149*  137*     Hepatic:   Recent Labs      08/15/18   0925   AST  11   ALT  14   BILITOT  1.2   ALKPHOS  129*     Troponin: No results for input(s): TROPONINI in the last 72 hours. BNP: No results for input(s): BNP in the last 72 hours. Lipids:   No results for input(s): CHOL, HDL in the last 72 hours. Invalid input(s): LDLCALCU  INR:   No results for input(s): INR in the last 72 hours.     Radiology    Objective:   Vitals: /64   Pulse 91   Temp 99.4 °F (37.4 °C) (Oral)   Resp 17   Ht 5' 11\" (1.803 m)   Wt 272 lb 0.8 oz (123.4 kg)   SpO2 100%   BMI 37.94 kg/m²   HEENT: Head:pupils react  Neck: supple thick  Lungs: air exchange appreciated bilat  Heart: regular  rhythm   Abdomen: distended BS are appreciated NG NT  Extremities: warm +  Edema all 4 ext  Neurologic:  Awake ,paraplegic    Impression:   :   Severe Sepsis with Stap bactremia and Enterococcus in urine  S/p spinal surgery for metastatic prostate cancer to the spine with acute cord compression and paraplegia  With prolonged effect of anesthesia now with infected surgical site  Acute Resp failure extubated now  Anemia s/p PRBC 1 unit  Leucocytosis  Fluid retention  IRDM  Rhabdo  improved  Hypothyroidism  ED  COPD  Hypotension back on  pressors  Ileus clinically improved

## 2018-08-16 NOTE — PROGRESS NOTES
(poor intake/edema)  · Ideal Body Wt: 172 lb (78 kg), % Ideal Body 160%  · Adjusted Body Wt: 203 lb (92.1 kg), body weight adjusted for Obesity  · BMI Classification: BMI 35.0 - 39.9 Obese Class II  · Comparative Standards (Estimated Nutrition Needs):  · Estimated Daily Total Kcal: 9322 (28/kgm adj. wt. of 92kgm)  · Estimated Daily Protein (g): 120-138 grams (1.3-1.5/kgm adj. wt. of 92kgm)    Estimated Intake vs Estimated Needs: Intake Less Than Needs    Nutrition Risk Level: High    Nutrition Interventions:    (Monitor ability to use gut. When diet is FL or more, will restart  ONS)  Continued Inpatient Monitoring, Education Initiated, Coordination of Care (Encouraged small po feeds as tolerated to boost kcals/protein. )    Nutrition Evaluation:   · Evaluation: Goals set   · Goals: adequate nutrition within 1-4 days    · Monitoring: Diet Progression, NPO Status, Diet Tolerance, Wound Healing, Ascites/Edema, Weight, Pertinent Labs, Nausea or Vomiting, Diarrhea    See Adult Nutrition Doc Flowsheet for more detail.      Electronically signed by Margarita Armando RD, LD on 8/16/18 at 2:14 PM    Contact Number: [phone]

## 2018-08-16 NOTE — PROGRESS NOTES
PICC Procedure Note    Wu Dyer   Admitted- 8/1/2018  5:04 PM  Admission diagnosis- Cord compression [facility]) [G95.20]      Attending Physician- Shanthi Shah MD  Ordering Physician- [facility]  Indication for Insertion: Poor Vascular Access    Catheter Insertion Date- 8/16/2018   Lot Number- [id number]  Gauge-6  Lumen-triple    Insertion Site- TATE Basilic  Vein Diameter- 1.35 mm  Catheter Length- 50 cm  Internal Length- 49 cm  Exposed Catheter Length- 1cm   Upper Arm Circumference- 32cm  Tip Confirmation System Bundle met- Yes  If X-ray required, Tip Location- N/A  Radiologist- N/A    PICC insertion successful- Yes  Ultrasound- yes    Okay To Use PICC- Yes    Electronically signed by Juana Mayen RN on 8/16/2018 at 4:53 PM

## 2018-08-16 NOTE — PROGRESS NOTES
Physical Medicine & Rehabilitation   Progress Note    Chief Complaint:  Acute T8 spinal cord compression due to metastasis and pathologic fracture s/p surgical decompression on 8/3/2018. Rehab needs. Subjective:  Patient seen lying in bed. Patient states he is not feeling well today. Dr. Sofia Zamudio and Dr. Gopi Issa opened back incision and drained a gross amount of purulent drainage. The incision was left open and packed with Kerlix. Infectious disease is considering a wound vac placement. Remains on antibiotics. Patient has completed some bed exercises with PT, however is limited by fatigue at this time. Ileus continues to be an issue, GI attempted SBFL, however patient refused to drink contrast. Patient extremely fatigued at this time. Palliative care now more involved. Rehabilitation:  Physical therapy: FIMS:  Bed Mobility:   FIMS:  , PT Equipment Recommendations  Other: cont to assess needs, Assessment: Pt tolerated exercises and ROM fairly with rest breaks and was noted to have frequent involuntary muscle spasms in renae LEs. Recommend continued skilled PT. Occupational therapy: FIMS:   ,  , Assessment: Pt demo significant deficits in BLE, as well as tone & surgical pain. Continued OT recommended to educate Pt on compensatory strategies for ADLs & further assess safe t/f technique.        Review of Systems:  CONSTITUTIONAL:  negative  RESPIRATORY:  positive for  dyspnea and oxygen per nasal cannula  CARDIOVASCULAR:  negative  GASTROINTESTINAL:  positive for nausea, abdominal pain and abdominal distention- improved  GENITOURINARY:  negative  MUSCULOSKELETAL:  negative for  myalgias, joint swelling, stiff joints, decreased range of motion and muscle weakness  NEUROLOGICAL:  positive for coordination problems, gait problems, weakness, numbness, pain and tingling  BEHAVIOR/PSYCH:  positive for depressed mood  System review otherwise negative    Objective:  /64   Pulse 91   Temp 99.4 °F (37.4 °C) (Oral)

## 2018-08-16 NOTE — PROGRESS NOTES
endurance; Patient limited by pain    Assessment: Body structures, Functions, Activity limitations: Decreased functional mobility , Decreased balance, Decreased endurance, Decreased ROM, Decreased strength, Decreased coordination, Decreased sensation  Assessment: Pt tolerated exercises and ROM fairly with rest breaks and was noted to have frequent involuntary muscle spasms in renae LEs. Recommend continued skilled PT. Prognosis: Good     REQUIRES PT FOLLOW UP: Yes  Discharge Recommendations: Continue to assess pending progress, Patient would benefit from continued therapy after discharge    Patient Education:  Patient Education: range of motion and heelcord stretching with spouse and daughter    Equipment Recommendations: Other: cont to assess needs    Safety:  Type of devices:  All fall risk precautions in place, Bed alarm in place, Call light within reach, Nurse notified, Gait belt, Patient at risk for falls, Left in bed    Plan:  Times per week: 5-6X N/O  Times per day: Daily  Specific instructions for Next Treatment: therex, bed mobility, sitting balance, slideboard transfers, w/c mobility, PT to assess transfers  Current Treatment Recommendations: Strengthening, ROM, Functional Mobility Training, Balance Training, Endurance Training, Home Exercise Program, Safety Education & Training, Patient/Caregiver Education & Training, Pain Management, Equipment Evaluation, Education, & procurement, Neuromuscular Re-education, Transfer Training    Goals:  Patient goals : less pain    Short term goals  Time Frame for Short term goals: 2 weeks  Short term goal 1: bed mobility with modAx2 to get in/out of bed  Short term goal 2: tolerate >10 min dyn sitting balance with 0-1 UE support and </=minAx1 to demonstrate inc trunk control for progression to transfers  Short term goal 3: slideboard transfer with modAx2 to get bed to/from w/c safely    Long term goals  Time Frame for Long term goals : no LTGs set secondary to short ELOS          AM-PAC Inpatient Mobility without Stair Climbing Raw Score : 5  AM-PAC Inpatient without Stair Climbing T-Scale Score : 23.59  Mobility Inpatient CMS 0-100% Score: 100  Mobility Inpatient without Stair CMS G-Code Modifier : RICHARD [place] Blood, [place] [address]

## 2018-08-16 NOTE — PROGRESS NOTES
0730 Dr. Aggie Smiley up to see pt, orders received    0745 Dr. Marisa Holland up to see pt    0930 talked with Dr. Janna Le about pt    1000 Dr. Jade Jolley up to see pt, orders received    21  x-ray up to do small bowel follow through     [id number] pt only able to drink 1 cup of contrast, x-ray techs will check with radiologist and see it is enough    1100 Dr. Janna Le and his team made rounds on pt, updated on condition    1120 x-ray tech back up and said that radiologist said unable to do test    1130 text sent to Trish Martínez CNP for Dr. Joellen Gonzalez and updated on unable to do test    1150 Dr. Milton Tenorio called up and updated on x-ray and chemstick results, orders received    1500 pt c/o chest pain, orders received    1600 Dr. Janna Le came back to see pt, orders received    1830 Dr. David Pedraza up to see pt talked with wife and daughter    1 reported off to [facility] RN

## 2018-08-16 NOTE — PROGRESS NOTES
00:00 - Patient reporting chest pain with midnight assessment. Patient states pain is in right side of chest and is worse with inspiration. Patient describes pain as a \"sharp\" continuous pain that is rated 2/10. Patient denies radiation of pain. EKG obtained. 00:45 - EKG showed to Dr. Morales Fairchild. Order received to give 1 mg morphine. No further orders received. 01:20 - Patient reports chest pain has subsided at this time. Continues to report 7/10 headache. 01:22 - Morphine 1 mg given per order. 02:54 - Patient reporting chest pain on both sides of chest. States pain is a crushing, continuous 10/10 pain that radiates to left jaw and down the left arm. Patient states pain woke him from sleeping and that it had a sudden onset. Patient reports feelings of shortness of breath. Current VS: HR 98 NSR, BP 99/58 (72) , 100% on 1 L N/C, RR 17. Stat order placed for EKG. Patient taken off home CPAP machine and placed on 1 L O2 via N/C.     03:04 - EKG obtained. 03:36 - Dr. Chaim Damian paged regarding chest pain. Awaiting response at this time. 04:07 - Spoke with Dr. Chaim Damian regarding patient's reports of chest pain. Notified that patient is reporting 10/10 chest pain that is in both sides of chest and radiates to left jaw and left arm. Notified that EKG was obtained and was unchanged from previous EKG. Received order to give morphine 1 mg Q4H PRN, obtain serial troponins Q6H x2 occurrences, and to consult cardiology if troponin's come back positive. No further orders received at this time.

## 2018-08-16 NOTE — PROGRESS NOTES
Discussed in detail with Dr. Segundo Toro. Met with patient, wife, and daughter at length. They are aware (from discussion with Dr. Griselda Ponds yesterday) that overall prognosis is poor. They understand that length of survival is unknown but that it could be weeks/months. They are appropriately processing this news and asking appropriate questions. Their  was here this morning and they were able to complete paperwork. Discussed resuscitation wishes with patient and family. He wishes for Limited Resuscitation: No shock, No CPR, No intubation, No resuscitative meds. Family supportive of this. Discussed need for hospice care eventually, as his condition is likely to decline with time (how long is unknown). Patient does state uncontrolled back pain. Reviewed MAR and discussed with Dr. Segundo Toro; will continue with current PRN morphine and percocet given more regularly if needed, and will reassess tomorrow morning. Much emotional support provided. Primary RN Anthony Siddiqi updated on discussion. Palliative care will continue to follow.

## 2018-08-16 NOTE — CONSULTS
Assessment and Plan:        1. Paraplegia: Secondary to cord compression from tumor involving T7/T8 with fracture.  Status post surgical debulking and repair 8/3/18. 2. Metastatic prostate cancer: Previous surgical resection in 2014. Widely metastatic disease. Very poor prognosis. 3. Diabetes mellitus: Subcutaneous insulin with sliding scale insulin. 4. COPD: Asthmatic component.  Treat with as needed albuterol. 5. Hypothyroidism: On Synthroid. 6. Morbid obesity: Aware. 7. Obstructive sleep apnea: Recommend BiPAP at night. 8. Septicemia: Bacterial seeding into the blood stream from postsurgical wound infection. Secondary to MSSA. On Ancef. Infectious disease documented central line infection. I suspect seeding from wound. 9. Surgical site wound infection: Growing MSSA. Status post bedside debridement on 8/15/18. Undergoing dressing changes and possible wound VAC. I suspect patient will require lifetime suppressive therapy with antibiotics. 10. Hypotension: Status post pressors. Now off pressors. Secondary to sepsis. 11. Ileus: Multiple bowel decompressions. On Movantik to help with GI motility. 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 12. Acute respiratory failure: Patient extubated 8/5/18.   Resolved.     CC:  Metastatic prostate cancer  HPI: The patient is a 77-year-old white male reformed smoker. Natali Gruber has a history of diabetes mellitus, COPD with asthmatic component, and hypothyroidism. Natali Gruber carries a diagnosis of sleep apnea.  He has a history of prostate cancer that was diagnosed in 2014. Natali Gruber underwent prostatectomy in 2016. Natali Gruber had progressive back pain and had an MRI completed in May 2018 which showed degenerative disc disease. Hilario iY continued to worsen and CT scan completed July 2018 showed sclerotic lesions and an MRI completed 7/26/18 showed extensive vertebral enhancing masses in the body of T7.  This was associated with neuroforaminal stenosis compressing the nerve roots.  Other enhancing lesions were noted at T6, and T8 through T12.  Follow-up PSA was 0.  He was started on systemic steroids. [facility] was admitted on 8/1/18 for evaluation of possible surgery. [facility] developed lower extremity weakness when he presented to the emergency room for admission. [facility] had rapid neurologic decline with subsequent paralysis to the lower extremities bilaterally and had no pin prick sensation below the nipple.  However, he had no incontinence.  Patient had paraplegia secondary to cord compression secondary to tumor involvement of the spine at T8 with fracture.  Patient underwent extensive surgical debridement and repair on 8/3/18. [facility] was brought to the intensive care unit on mechanical ventilator support postoperatively. Patient was extubated 8/5/18. Patient subsequently developed issues with ileus and required multiple bowel decompression colonoscopies. Despite these interventions, ileus has persisted. Unfortunately, the patient developed abscess at the surgical site with multiple areas of gas production in the deep surgical site. Patient had drainage from the incision site. He subsequently developed bacteremia from the surgical site infection. Organism is MSSA. Patient treated with Ancef. He did undergo debridement locally on 8/15/18, and will require lifetime suppressive therapy after acute treatment has completed. ROS: Patient relates that his bloating is worse. His abdominal distention is worse. He states that as his abdomen swells he develops more chest pain. He relates that Percocet helps a little but not a lot. Still no bowel movement. He denies fevers or chills. He denies wheezing. He denies angina. He does have nausea. He states that he is starting to get some sensation in the legs, but has no ability to move his legs. He denies hemoptysis or epistaxis. He denies hallucinations. He does complain of depression and frustration.   PMH:  Per HPI  SHX:  Reformed smoker  FHX: Positive

## 2018-08-17 LAB
ANION GAP SERPL CALCULATED.3IONS-SCNC: 10 MEQ/L (ref 8–16)
BASOPHILS # BLD: 0.2 %
BASOPHILS ABSOLUTE: 0 THOU/MM3 (ref 0–0.1)
BUN BLDV-MCNC: 27 MG/DL (ref 7–22)
CALCIUM SERPL-MCNC: 7.8 MG/DL (ref 8.5–10.5)
CHLORIDE BLD-SCNC: 97 MEQ/L (ref 98–111)
CO2: 26 MEQ/L (ref 23–33)
CREAT SERPL-MCNC: 0.6 MG/DL (ref 0.4–1.2)
DOHLE BODIES: PRESENT
EOSINOPHIL # BLD: 1.9 %
EOSINOPHILS ABSOLUTE: 0.1 THOU/MM3 (ref 0–0.4)
ERYTHROCYTE [DISTWIDTH] IN BLOOD BY AUTOMATED COUNT: 14.8 % (ref 11.5–14.5)
ERYTHROCYTE [DISTWIDTH] IN BLOOD BY AUTOMATED COUNT: 51.3 FL (ref 35–45)
GFR SERPL CREATININE-BSD FRML MDRD: > 90 ML/MIN/1.73M2
GLUCOSE BLD-MCNC: 123 MG/DL (ref 70–108)
GLUCOSE BLD-MCNC: 90 MG/DL (ref 70–108)
HCT VFR BLD CALC: 23.6 % (ref 42–52)
HEMOGLOBIN: 7.4 GM/DL (ref 14–18)
IMMATURE GRANS (ABS): 0.04 THOU/MM3 (ref 0–0.07)
IMMATURE GRANULOCYTES: 0.7 %
LYMPHOCYTES # BLD: 4.6 %
LYMPHOCYTES ABSOLUTE: 0.3 THOU/MM3 (ref 1–4.8)
MCH RBC QN AUTO: 29.8 PG (ref 26–33)
MCHC RBC AUTO-ENTMCNC: 31.4 GM/DL (ref 32.2–35.5)
MCV RBC AUTO: 95.2 FL (ref 80–94)
MONOCYTES # BLD: 8.1 %
MONOCYTES ABSOLUTE: 0.5 THOU/MM3 (ref 0.4–1.3)
NUCLEATED RED BLOOD CELLS: 0 /100 WBC
PLATELET # BLD: 238 THOU/MM3 (ref 130–400)
PMV BLD AUTO: 9.5 FL (ref 9.4–12.4)
POTASSIUM SERPL-SCNC: 3.8 MEQ/L (ref 3.5–5.2)
RBC # BLD: 2.48 MILL/MM3 (ref 4.7–6.1)
SCAN OF BLOOD SMEAR: NORMAL
SEG NEUTROPHILS: 84.5 %
SEGMENTED NEUTROPHILS ABSOLUTE COUNT: 5 THOU/MM3 (ref 1.8–7.7)
SODIUM BLD-SCNC: 133 MEQ/L (ref 135–145)
WBC # BLD: 5.9 THOU/MM3 (ref 4.8–10.8)

## 2018-08-17 PROCEDURE — 6360000002 HC RX W HCPCS: Performed by: INTERNAL MEDICINE

## 2018-08-17 PROCEDURE — 6360000002 HC RX W HCPCS: Performed by: SURGERY

## 2018-08-17 PROCEDURE — 6370000000 HC RX 637 (ALT 250 FOR IP): Performed by: INTERNAL MEDICINE

## 2018-08-17 PROCEDURE — 85025 COMPLETE CBC W/AUTO DIFF WBC: CPT

## 2018-08-17 PROCEDURE — 94640 AIRWAY INHALATION TREATMENT: CPT

## 2018-08-17 PROCEDURE — 80048 BASIC METABOLIC PNL TOTAL CA: CPT

## 2018-08-17 PROCEDURE — 36415 COLL VENOUS BLD VENIPUNCTURE: CPT

## 2018-08-17 PROCEDURE — 6370000000 HC RX 637 (ALT 250 FOR IP): Performed by: PHYSICAL MEDICINE & REHABILITATION

## 2018-08-17 PROCEDURE — 2580000003 HC RX 258: Performed by: INTERNAL MEDICINE

## 2018-08-17 PROCEDURE — 6370000000 HC RX 637 (ALT 250 FOR IP): Performed by: PHYSICIAN ASSISTANT

## 2018-08-17 PROCEDURE — 82948 REAGENT STRIP/BLOOD GLUCOSE: CPT

## 2018-08-17 PROCEDURE — 2700000000 HC OXYGEN THERAPY PER DAY

## 2018-08-17 PROCEDURE — 1200000000 HC SEMI PRIVATE

## 2018-08-17 PROCEDURE — 1200000003 HC TELEMETRY R&B

## 2018-08-17 PROCEDURE — 6370000000 HC RX 637 (ALT 250 FOR IP): Performed by: SURGERY

## 2018-08-17 PROCEDURE — 99024 POSTOP FOLLOW-UP VISIT: CPT | Performed by: NEUROLOGICAL SURGERY

## 2018-08-17 PROCEDURE — 99232 SBSQ HOSP IP/OBS MODERATE 35: CPT | Performed by: SURGERY

## 2018-08-17 PROCEDURE — 2709999900 HC NON-CHARGEABLE SUPPLY

## 2018-08-17 PROCEDURE — 6370000000 HC RX 637 (ALT 250 FOR IP): Performed by: NURSE PRACTITIONER

## 2018-08-17 RX ORDER — MORPHINE SULFATE 2 MG/ML
2 INJECTION, SOLUTION INTRAMUSCULAR; INTRAVENOUS
Status: DISCONTINUED | OUTPATIENT
Start: 2018-08-17 | End: 2018-08-19

## 2018-08-17 RX ORDER — MORPHINE SULFATE 2 MG/ML
2 INJECTION, SOLUTION INTRAMUSCULAR; INTRAVENOUS ONCE
Status: COMPLETED | OUTPATIENT
Start: 2018-08-17 | End: 2018-08-17

## 2018-08-17 RX ORDER — LORAZEPAM 2 MG/ML
0.5 CONCENTRATE ORAL EVERY 4 HOURS PRN
Status: DISCONTINUED | OUTPATIENT
Start: 2018-08-17 | End: 2018-08-22 | Stop reason: HOSPADM

## 2018-08-17 RX ORDER — MORPHINE SULFATE 20 MG/ML
5 SOLUTION ORAL
Status: DISCONTINUED | OUTPATIENT
Start: 2018-08-17 | End: 2018-08-19

## 2018-08-17 RX ORDER — MORPHINE SULFATE 20 MG/ML
5 SOLUTION ORAL EVERY 4 HOURS PRN
Status: DISCONTINUED | OUTPATIENT
Start: 2018-08-17 | End: 2018-08-17

## 2018-08-17 RX ADMIN — SODIUM CHLORIDE: 9 INJECTION, SOLUTION INTRAVENOUS at 13:55

## 2018-08-17 RX ADMIN — PANTOPRAZOLE SODIUM 40 MG: 40 TABLET, DELAYED RELEASE ORAL at 07:43

## 2018-08-17 RX ADMIN — MORPHINE SULFATE 1 MG: 2 INJECTION, SOLUTION INTRAMUSCULAR; INTRAVENOUS at 21:10

## 2018-08-17 RX ADMIN — BISACODYL 10 MG: 10 SUPPOSITORY RECTAL at 07:43

## 2018-08-17 RX ADMIN — BACLOFEN 10 MG: 10 TABLET ORAL at 07:43

## 2018-08-17 RX ADMIN — IPRATROPIUM BROMIDE AND ALBUTEROL SULFATE 1 AMPULE: .5; 3 SOLUTION RESPIRATORY (INHALATION) at 09:27

## 2018-08-17 RX ADMIN — POLYETHYLENE GLYCOL 3350 17 G: 17 POWDER, FOR SOLUTION ORAL at 07:43

## 2018-08-17 RX ADMIN — MORPHINE SULFATE 1 MG: 2 INJECTION, SOLUTION INTRAMUSCULAR; INTRAVENOUS at 10:14

## 2018-08-17 RX ADMIN — DICLOFENAC 2 G: 10 GEL TOPICAL at 07:43

## 2018-08-17 RX ADMIN — ACETAMINOPHEN 650 MG: 325 TABLET ORAL at 21:10

## 2018-08-17 RX ADMIN — LEVOTHYROXINE SODIUM 50 MCG: 50 TABLET ORAL at 07:43

## 2018-08-17 RX ADMIN — NEOSTIGMINE METHYLSULFATE 1 MG: 1 INJECTION, SOLUTION INTRAVENOUS at 13:46

## 2018-08-17 RX ADMIN — DULOXETINE HYDROCHLORIDE 60 MG: 60 CAPSULE, DELAYED RELEASE ORAL at 07:42

## 2018-08-17 RX ADMIN — NALOXEGOL OXALATE 12.5 MG: 12.5 TABLET, FILM COATED ORAL at 13:46

## 2018-08-17 RX ADMIN — BACLOFEN 10 MG: 10 TABLET ORAL at 13:46

## 2018-08-17 RX ADMIN — Medication 2 G: at 06:50

## 2018-08-17 RX ADMIN — MORPHINE SULFATE 2 MG: 2 INJECTION, SOLUTION INTRAMUSCULAR; INTRAVENOUS at 22:30

## 2018-08-17 RX ADMIN — IPRATROPIUM BROMIDE AND ALBUTEROL SULFATE 1 AMPULE: .5; 3 SOLUTION RESPIRATORY (INHALATION) at 13:15

## 2018-08-17 RX ADMIN — IPRATROPIUM BROMIDE AND ALBUTEROL SULFATE 1 AMPULE: .5; 3 SOLUTION RESPIRATORY (INHALATION) at 21:32

## 2018-08-17 RX ADMIN — NEOSTIGMINE METHYLSULFATE 1 MG: 1 INJECTION, SOLUTION INTRAVENOUS at 01:34

## 2018-08-17 RX ADMIN — ACETAMINOPHEN 650 MG: 325 TABLET ORAL at 06:30

## 2018-08-17 RX ADMIN — DOCUSATE SODIUM 100 MG: 100 CAPSULE, LIQUID FILLED ORAL at 07:43

## 2018-08-17 RX ADMIN — Medication 5 MG: at 13:43

## 2018-08-17 RX ADMIN — Medication 5 MG: at 17:49

## 2018-08-17 RX ADMIN — NEOSTIGMINE METHYLSULFATE 1 MG: 1 INJECTION, SOLUTION INTRAVENOUS at 07:52

## 2018-08-17 RX ADMIN — Medication 10 ML: at 07:54

## 2018-08-17 RX ADMIN — BICALUTAMIDE 50 MG: 50 TABLET, FILM COATED ORAL at 07:42

## 2018-08-17 RX ADMIN — NYSTATIN 500000 UNITS: 100000 SUSPENSION ORAL at 07:43

## 2018-08-17 RX ADMIN — MORPHINE SULFATE 1 MG: 2 INJECTION, SOLUTION INTRAMUSCULAR; INTRAVENOUS at 01:34

## 2018-08-17 RX ADMIN — PREGABALIN 75 MG: 75 CAPSULE ORAL at 07:43

## 2018-08-17 RX ADMIN — IPRATROPIUM BROMIDE AND ALBUTEROL SULFATE 1 AMPULE: .5; 3 SOLUTION RESPIRATORY (INHALATION) at 16:27

## 2018-08-17 RX ADMIN — MORPHINE SULFATE 1 MG: 2 INJECTION, SOLUTION INTRAMUSCULAR; INTRAVENOUS at 05:35

## 2018-08-17 RX ADMIN — Medication 5 MG: at 23:56

## 2018-08-17 RX ADMIN — Medication 2 G: at 22:32

## 2018-08-17 RX ADMIN — Medication 2 G: at 13:54

## 2018-08-17 ASSESSMENT — PAIN SCALES - GENERAL
PAINLEVEL_OUTOF10: 8
PAINLEVEL_OUTOF10: 7
PAINLEVEL_OUTOF10: 8
PAINLEVEL_OUTOF10: 7
PAINLEVEL_OUTOF10: 8
PAINLEVEL_OUTOF10: 5
PAINLEVEL_OUTOF10: 8
PAINLEVEL_OUTOF10: 6
PAINLEVEL_OUTOF10: 9
PAINLEVEL_OUTOF10: 8
PAINLEVEL_OUTOF10: 7
PAINLEVEL_OUTOF10: 9
PAINLEVEL_OUTOF10: 7
PAINLEVEL_OUTOF10: 6

## 2018-08-17 NOTE — PROGRESS NOTES
Patient arrived to [place] [id number] from ICU, in bed, VS assessed. Family not present at this time. Vika Moulton RN from hospice on unit to see patient.

## 2018-08-17 NOTE — PROGRESS NOTES
Discussed with Dr. Owen Finely consult placed.  hospice called and will see patient this morning. Patient resting in bed with eyes closed; he looks very comfortable, no distress. Primary RN reports that PRN Morphine 1mg was given every 4 hours through night and patient reported much better pain control this morning. Family reports that patient is \"in and out\" of sleep but does still respond. Patient and family ready for hospice. Patient's wish is to go home if possible; [facility] wishes [facility] them) if home going is possible.  hospice RN Brandy Lopez updated and will evaluate patient and speak with family and will further develop plan. Wife and daughter express that patient is at peace and \"ready\". Much emotional support provided. Family is also at peace with everything. Palliative care will continue to follow and assist as needed.

## 2018-08-17 NOTE — PLAN OF CARE
Problem: Nutrition  Goal: Optimal nutrition therapy  Outcome: Ongoing  Nutrition Problem: Increased nutrient needs  Intervention: Food and/or Nutrient Delivery: Continue NPO ([place] pt/family wishes regarding overall plan of care.   Should plans change, may consider parenteral nutrition.  )  Nutritional Goals: adequate nutrition within 1-4 days

## 2018-08-17 NOTE — PROGRESS NOTES
0745 assessment completed and charted    200 Dr. Negro Kaplan up to see pt     0775 text message sent to Dr. Gabriel Silver about transferring out, awaiting call     0900 Dr. Gabriel Silver up to see pt, orders received    0915 Dr. Christine Hamilton up to see pt     1000 report called to [address] 82,[place] [address] on 5K    1040 pt transported to 5 per bed with transport team

## 2018-08-17 NOTE — PLAN OF CARE
Problem: Impaired respiratory status  Intervention: Administer nebulizer treatments  Patient lung sounds are considered normal for their current lung condition. No signs of distress noted.  Current treatment regimen appropriate

## 2018-08-17 NOTE — PLAN OF CARE
Problem: Impaired respiratory status  Goal: Lung sounds clear or within normal limits for patient  Outcome: Ongoing  Treatment will be continued as ordered to improve aeration.

## 2018-08-17 NOTE — FLOWSHEET NOTE
[facility] 60  OCCUPATIONAL THERAPY MISSED TREATMENT NOTE  Zander Monsivais MED 5K  [id number]      Date: 2018  Patient Name: Nettie Hernandez        CSN: [id number]   : 1955  (61 y.o.)  Gender: male   Referring Practitioner: Dr. Lake Garcia  Diagnosis: cord compression         REASON FOR MISSED TREATMENT:  Missed Treat. Pt & family have decided on comfort care only at this time. Pt will likely go home with hospice. Will discharge from therapy at this time.

## 2018-08-17 NOTE — PROGRESS NOTES
Hospice referral completed with wife Debbie Light, daughter, and 3 grandchildren out in sitting area. Pt lying in bed with eyes closed. Went in with primary nurse Ariana CASTILLO. VS stable. Respirations easy and unlabored. Did not open eyes but did answer to name. Asked him about pain and reported 5 on Karen scale. Said that acceptable level for him. Talked about care and he voiced that wishes for comfort care only with no further aggressive treatment. Wish would to go home but if symptoms would be out of control would be willing to stay in hospital. Hospice concepts, philosophies, and services explained. Discussed qualification process as well as criteria for inpatient hospice. Family voiced that want to get patient home as this is his wishes. Talked with them about care in home and at this time they wish for [facility] hosp[facility] as they are closer to patient location. Much emotional support provided as well as education on dying and disease process. Updated SW Chyna Bush of need for referral to [address] Dr. Jaycob Leyva on floor and updated. SL medication ordered as suggested by pharmacist for transition for medication that can be administered in home. Probable home with hospice on Saturday if hospice can admit. [facility] will check on patient status on Saturday and remain available as needed for questions/concerns or patient changes.

## 2018-08-17 NOTE — PROGRESS NOTES
IM Progress Note  Dr. Allyn Arenas  8/17/2018 9:26 AM      Patient name Jonel Jackson  [id number]  PCP: Joe Mazariegos MD  Admit Date: 8/1/2018  Acct No. [de-identified]    Subjective: Interval History:   Pt now limited code  Still with intermittent epigastric pain  Now resting and sleepy from morphine  Off pressors        Diet: Diet NPO Time Specified Exceptions are: Sips with Meds, Ice Chips    I/O last 3 completed shifts: In: 1079.6 [P.O.:200; I.V.:879.6]  Out: 1100 [Urine:1100]  No intake/output data recorded. Admission weight: 279 lb (126.6 kg) as of 8/1/2018  5:04 PM  Wt Readings from Last 3 Encounters:   08/15/18 272 lb 0.8 oz (123.4 kg)     Body mass index is 37.94 kg/m².           Medications:   Scheduled Meds:   neostigmine  1 mg Intramuscular Q6H    lidocaine 1 % injection  5 mL Intradermal Once    sodium chloride flush  10 mL Intravenous 2 times per day    ceFAZolin  2 g Intravenous Q8H    sodium hypochlorite   Irrigation Daily    sodium chloride  250 mL Intravenous Once    diclofenac sodium  2 g Topical BID    enoxaparin  40 mg Subcutaneous Q24H    sodium chloride flush  10 mL Intravenous Q12H    pantoprazole  40 mg Oral QAM AC    naloxegol  12.5 mg Oral QAM    lidocaine  2 patch Transdermal Daily    pregabalin  75 mg Oral BID    docusate sodium  100 mg Oral BID    senna  2 tablet Oral Nightly    bisacodyl  10 mg Rectal Daily    baclofen  10 mg Oral TID    polyethylene glycol  17 g Oral Daily    ipratropium-albuterol  1 ampule Inhalation Q4H WA    calcium replacement protocol   Other RX Placeholder    potassium (CARDIAC) replacement protocol   Other RX Placeholder    magnesium replacement protocol   Other RX Placeholder    DULoxetine  60 mg Oral Daily    nystatin  5 mL Oral 4x Daily    bicalutamide  50 mg Oral Daily    Fluticasone Furoate-Vilanterol  1 applicator Inhalation Daily    levothyroxine  50 mcg Oral Daily     Continuous Infusions:   sodium chloride 50 mL/hr

## 2018-08-17 NOTE — CARE COORDINATION
8/17/18, 3:03 PM    DISCHARGE [facility] with [facility] has advised that patient is not meeting criteria for in-patient hospice. Family would like to take patient home with hospice through [address] Call placed to [facility] and referral information provided. Advised that [facility] staff will assess tomorrow to see if patient is meeting in-patient criteria-if not, plan is to transition to home. Obtained contact information for the weekend. Chart information faxed for review. RN and family updated. Hospice RN Sharda Clements also updated. Directions left on chart for staff.

## 2018-08-17 NOTE — PROGRESS NOTES
This may represent a diffuse ileus. However, overall, the degree of gaseous distention appears slightly improved from the prior examination from 8/12/2018. Lumbar fusion surgical hardware is demonstrated along with partial visualization of thoracic fusion hardware. Mesh from prior hernia repair seen overlying the lower leftward abdomen. Multilevel degenerative disc disease of the lumbar spine is demonstrated. There is diffuse osteopenia. There is bilateral hip joint arthrosis.       Impression  1. Multiple distended gas-filled bowel loops are seen overlying the abdomen and pelvis. This involves both large and small bowel loops. This may represent a diffuse ileus. The overall degree of gaseous distention appears slightly improved from the prior   examination. Recommend continued follow-up.     **This report has been created using voice recognition software.  It may contain minor errors which are inherent in voice recognition technology. **     Final report electronically signed by Dr. Marge Mary on 8/13/2018 1:25 PM    PROCEDURE: XR ABDOMEN (KUB) (SINGLE AP VIEW)  8/10/2018   CLINICAL INFORMATION: Follow-up ileus.   COMPARISON: No prior study.   TECHNIQUE: AP supine abdomen performed.      FINDINGS:   POSTOPERATIVE CHANGES:   1. Posterior spinal fusion hardware thoracolumbar spine. 2. Anterior disc space cages lower lumbar spine. 3. Several surgical coils overlying the pelvis.   LINES/TUBES/MECHANICAL DEVICES:   1. The distal tip of the esophageal tube is within the stomach.   BOWEL GAS PATTERN:  1.  Stable gaseous distention of the colon and gas within several loops of small bowel which in the right clinical setting is consistent with an ileus which is not significantly changed from the previous examination.   LUNG BASES:   1.  There is blunting of the left lateral costophrenic angle suggesting a small amount of pleural fluid.   FREE AIR: None.   MASS SHADOWS: None.   PATHOLOGIC CALCIFICATIONS: None.   OSSEOUS STRUCTURES:   1. No acute osseous abnormality. 2. There is generalized osteopenia. 3. Paravertebral ossifications at several levels along the lumbar spine.   OTHER: None.     Impression  1.  Stable gaseous distention of the colon and gas within several loops of small bowel which in the right clinical setting is consistent with an ileus which is not significantly changed from the previous examination.      **This report has been created using voice recognition software.  It may contain minor errors which are inherent in voice recognition technology. **     Final report electronically signed by Dr. Irvin Gonzalez on 8/10/2018 10:53 AM    PROCEDURE: XR ABDOMEN (KUB) (SINGLE AP VIEW)  CLINICAL INFORMATION: ileus; compare with recent series. ,    COMPARISON: No prior study. TECHNIQUE: 3 supine images    FINDINGS: Diffuse gaseous distention of the colon the colon is dilated to the level of the distal descending colon. There is gas in the rectum. Multiple loops of gas-filled nondilated small bowel are present scattered throughout the abdomen. Hernia mesh   is evident. Lumbar intervertebral spacer devices are present. Impression:    Diffuse ileus with both the gas-filled loops of small bowel and dilated colon. Continued progress imaging suggested. Previously seen NG tube is not identified on this image. **This report has been created using voice recognition software.  It may contain minor errors which are inherent in voice recognition technology. **    Final report electronically signed by Dr. Rhonda Whitney on 8/9/2018 8:33 AM    PROCEDURE: XR ABDOMEN (KUB) (SINGLE AP VIEW)  8/8/2018   CLINICAL INFORMATION: Ileus. Recent back surgery. . Abdominal distention.   COMPARISON: 8/7/2018   TECHNIQUE: 3 supine images of the abdomen were obtained.     FINDINGS:   There is moderate gaseous distention of the colon and moderate gaseous distention of a few centrally located small bowel loops, consistent with moderate postoperative ileus. The overall appearance is slightly worsened since yesterday. . Kidneys are somewhat obscured. No definite renal or ureteral calculi are seen. There are a few phleboliths in the pelvis. Vascular clips are present in the pelvis from prior surgery. Metallic rods are present in the lower thoracic spine. Mild left basilar atelectasis/pneumonia.      Impression  1. Mild left basilar atelectasis/pneumonia. 2. Moderate postoperative ileus, slightly worse than yesterday.      **This report has been created using voice recognition software.  It may contain minor errors which are inherent in voice recognition technology. **     Final report electronically signed by Dr. Christiano Harrison on 8/8/2018 1:11 PM    PROCEDURE: XR ABDOMEN (KUB) (SINGLE AP VIEW)  8/7/2018   CLINICAL INFORMATION: Constipation.   COMPARISON: No prior study.   TECHNIQUE: 3 AP supine views of the abdomen performed.      FINDINGS:   POSTOPERATIVE CHANGES:   1. Partially imaged posterior spinal fixation hardware lower thoracic spine. 2. Disc space cages at L3-4, L4-5 and L5-S1.  3. Surgical clips overlie the inferior aspect of the mid pelvis and the medial aspect of the proximal right thigh.   LINES/TUBES/MECHANICAL DEVICES: None.   BOWEL GAS PATTERN:  1.  There is mild gaseous distention of the colon and gas within several nondistended loops of small bowel which in the right clinical setting could represent an ileus. There is a small amount stool within the ascending colon.   LUNG BASES:   1. Not included within the field of imaging.   FREE AIR: None.   MASS SHADOWS: None.   PATHOLOGIC CALCIFICATIONS: None.   OSSEOUS STRUCTURES:   1. Please refer to the CT abdomen/pelvis examination report dated 8/1/2018 for findings related to the spine.   OTHER: None.      Impression  1. [place] is mild gaseous distention of the colon and gas within several nondistended loops of small bowel which in the right clinical setting could represent an ileus.  There is a small and in addition see orders and plans    Electronically signed by Ang English MD on 8/17/2018 at 5:50 PM

## 2018-08-18 PROCEDURE — 1200000000 HC SEMI PRIVATE

## 2018-08-18 PROCEDURE — 2580000003 HC RX 258: Performed by: INTERNAL MEDICINE

## 2018-08-18 PROCEDURE — 1200000003 HC TELEMETRY R&B

## 2018-08-18 PROCEDURE — 6370000000 HC RX 637 (ALT 250 FOR IP): Performed by: INTERNAL MEDICINE

## 2018-08-18 PROCEDURE — 6360000002 HC RX W HCPCS: Performed by: INTERNAL MEDICINE

## 2018-08-18 PROCEDURE — 94640 AIRWAY INHALATION TREATMENT: CPT

## 2018-08-18 RX ORDER — MORPHINE SULFATE 2 MG/ML
2 INJECTION, SOLUTION INTRAMUSCULAR; INTRAVENOUS ONCE
Status: COMPLETED | OUTPATIENT
Start: 2018-08-18 | End: 2018-08-18

## 2018-08-18 RX ADMIN — Medication 5 MG: at 02:27

## 2018-08-18 RX ADMIN — MORPHINE SULFATE 2 MG: 2 INJECTION, SOLUTION INTRAMUSCULAR; INTRAVENOUS at 04:14

## 2018-08-18 RX ADMIN — IPRATROPIUM BROMIDE AND ALBUTEROL SULFATE 1 AMPULE: .5; 3 SOLUTION RESPIRATORY (INHALATION) at 07:53

## 2018-08-18 RX ADMIN — MORPHINE SULFATE 2 MG: 2 INJECTION, SOLUTION INTRAMUSCULAR; INTRAVENOUS at 23:31

## 2018-08-18 RX ADMIN — MORPHINE SULFATE 2 MG: 2 INJECTION, SOLUTION INTRAMUSCULAR; INTRAVENOUS at 16:58

## 2018-08-18 RX ADMIN — MORPHINE SULFATE 2 MG: 2 INJECTION, SOLUTION INTRAMUSCULAR; INTRAVENOUS at 18:03

## 2018-08-18 RX ADMIN — Medication 5 MG: at 12:54

## 2018-08-18 RX ADMIN — MORPHINE SULFATE 2 MG: 2 INJECTION, SOLUTION INTRAMUSCULAR; INTRAVENOUS at 12:01

## 2018-08-18 RX ADMIN — MORPHINE SULFATE 2 MG: 2 INJECTION, SOLUTION INTRAMUSCULAR; INTRAVENOUS at 01:22

## 2018-08-18 RX ADMIN — Medication 5 MG: at 19:47

## 2018-08-18 RX ADMIN — Medication 10 ML: at 21:14

## 2018-08-18 RX ADMIN — Medication 5 MG: at 07:11

## 2018-08-18 RX ADMIN — MORPHINE SULFATE 2 MG: 2 INJECTION, SOLUTION INTRAMUSCULAR; INTRAVENOUS at 21:12

## 2018-08-18 RX ADMIN — Medication 5 MG: at 22:05

## 2018-08-18 RX ADMIN — Medication 2 G: at 05:58

## 2018-08-18 ASSESSMENT — PAIN SCALES - GENERAL
PAINLEVEL_OUTOF10: 9
PAINLEVEL_OUTOF10: 10
PAINLEVEL_OUTOF10: 8
PAINLEVEL_OUTOF10: 9
PAINLEVEL_OUTOF10: 10
PAINLEVEL_OUTOF10: 9
PAINLEVEL_OUTOF10: 6
PAINLEVEL_OUTOF10: 9
PAINLEVEL_OUTOF10: 10
PAINLEVEL_OUTOF10: 10
PAINLEVEL_OUTOF10: 9

## 2018-08-18 NOTE — PLAN OF CARE
Problem: Impaired respiratory status  Goal: Lung sounds clear or within normal limits for patient  Outcome: Ongoing  Improve aeration of lungs. Decrease WOB.

## 2018-08-18 NOTE — PROGRESS NOTES
Covering Dr. Idalmis Manzanares day 14    S/P  bilateral pedicle screws in T5, T6, T7, T10 and T11 + posterior decompression at T8-T9    Dx: metastatic prostate cancer + spinal compression and paraplegia     Today has more sick appearance. A/A/Ox3  FC x 2 with good strength in his upper extremities, no movement was noticed today in his lower extremities today. No changes in her his sensation in his lower extremities ( Feels better to sensation in right leg comparing with right). Has babinski's sign in both sides  Still having a significant drainage from his wound. A/P:    Post Op day 14    S/P  bilateral pedicle screws in T5, T6, T7, T10 and T11 + posterior decompression at T8-T9    Dx: metastatic prostate cancer + spinal compression and paraplegia. - condition: deteriorated  - Severe sepsis. - Today: no movement was noticed in his lower extremities my today assessment. - Wound care and infection managements per ID team ( please Dr. Eileen Seals). -  Medical management per patient's primary service ( hospitalist service)   -  Family starts thinking about comfort measures and most likely they will proceed with this option, I spoke with patient's wife and told her my input.  -  follow up the GI recommendations. -  Poor prognosis. -  Family support.

## 2018-08-18 NOTE — PLAN OF CARE
Problem: Impaired respiratory status  Goal: Lung sounds clear or within normal limits for patient  Outcome: Ongoing  duoneb aerosols continued Q4 WA to help improve aeration t/o lungs. Pt wanted tx stopped early, due to coughing.

## 2018-08-18 NOTE — PLAN OF CARE
Problem: Falls - Risk of:  Goal: Will remain free from falls  Will remain free from falls   Outcome: Ongoing  Patient free from falls this shift. Problem: Pain:  Goal: Pain level will decrease  Pain level will decrease   Outcome: Ongoing  Patient complains of generalized pain. PRN IV Morphine and oral Morphine given for pain. Problem: Urinary Elimination:  Goal: Signs and symptoms of infection will decrease  Signs and symptoms of infection will decrease   Outcome: Ongoing  Patient has adequate output via domínguez catheter. Problem: Infection - Central Venous Catheter-Associated Bloodstream Infection:  Goal: Will show no infection signs and symptoms  Will show no infection signs and symptoms   Outcome: Ongoing  No signs of infection noted around PICC line. Comments: Care plan reviewed with patient. Patient verbalizes understanding of the plan of care and contribute to goal setting.

## 2018-08-18 NOTE — PROGRESS NOTES
08/16/18   0436  08/17/18   0535   WBC  8.8  5.9   HGB  8.9*  7.4*   PLT  237  238     BMP:    Recent Labs      08/16/18   0436  08/17/18   0535   NA  132*  133*   K  4.1  3.8   CL  96*  97*   CO2  23  26   BUN  24*  27*   CREATININE  0.7  0.6   GLUCOSE  137*  123*     Hepatic:   Recent Labs      08/16/18   0446   AST  12   ALT  12   BILITOT  0.8   ALKPHOS  130*     Troponin: No results for input(s): TROPONINI in the last 72 hours. BNP: No results for input(s): BNP in the last 72 hours. Lipids:   No results for input(s): CHOL, HDL in the last 72 hours. Invalid input(s): LDLCALCU  INR:   No results for input(s): INR in the last 72 hours.     Radiology    Objective:   Vitals: /64   Pulse 111   Temp 98 °F (36.7 °C)   Resp 16   Ht 5' 11\" (1.803 m)   Wt 272 lb 0.8 oz (123.4 kg)   SpO2 93%   BMI 37.94 kg/m²   HEENT: Head:pupils react  Neck: supple thick  Lungs: decreased bilat  Heart: regular  rhythm   Abdomen: distended BS sluggish   Extremities: warm +  Edema all 4 ext  Neurologic:  sedated    Impression:   :   Severe Sepsis with Stap bactremia and Enterococcus in urine  S/p spinal surgery for metastatic prostate cancer to the spine with acute cord compression and paraplegia  With prolonged effect of anesthesia now with infected surgical site  Acute Resp failure extubated now  Anemia s/p PRBC 1 unit  Leucocytosis  Fluid retention  IRDM  Rhabdo  improved  Hypothyroidism  ED  COPD  Hypotension back on  pressors  Recurrent  Ileus s/p decompression colonoscopy  Epigastric pain now improved    Plan:    Continue comfort measures  Informed family wishes to keep him here rather than take home     Edie José MD

## 2018-08-18 NOTE — PROGRESS NOTES
[facility] and family's wish to stay here instead of going home with hospice today, spoke with Edilia East, updated. Called  hospice, spoke with Corazon Hitchcock RN, updated.

## 2018-08-19 LAB
AEROBIC CULTURE: ABNORMAL
ANAEROBIC CULTURE: ABNORMAL
GRAM STAIN RESULT: ABNORMAL
ORGANISM: ABNORMAL

## 2018-08-19 PROCEDURE — 2709999900 HC NON-CHARGEABLE SUPPLY

## 2018-08-19 PROCEDURE — 2580000003 HC RX 258: Performed by: INTERNAL MEDICINE

## 2018-08-19 PROCEDURE — 99024 POSTOP FOLLOW-UP VISIT: CPT | Performed by: NEUROLOGICAL SURGERY

## 2018-08-19 PROCEDURE — 1200000003 HC TELEMETRY R&B

## 2018-08-19 PROCEDURE — 6370000000 HC RX 637 (ALT 250 FOR IP): Performed by: INTERNAL MEDICINE

## 2018-08-19 PROCEDURE — 1200000000 HC SEMI PRIVATE

## 2018-08-19 PROCEDURE — 6360000002 HC RX W HCPCS: Performed by: INTERNAL MEDICINE

## 2018-08-19 RX ORDER — MORPHINE SULFATE 2 MG/ML
2 INJECTION, SOLUTION INTRAMUSCULAR; INTRAVENOUS
Status: DISCONTINUED | OUTPATIENT
Start: 2018-08-19 | End: 2018-08-22 | Stop reason: HOSPADM

## 2018-08-19 RX ORDER — MORPHINE SULFATE 20 MG/ML
10 SOLUTION ORAL EVERY 4 HOURS PRN
Status: DISCONTINUED | OUTPATIENT
Start: 2018-08-19 | End: 2018-08-22 | Stop reason: HOSPADM

## 2018-08-19 RX ADMIN — LORAZEPAM 0.5 MG: 2 SOLUTION, CONCENTRATE ORAL at 05:21

## 2018-08-19 RX ADMIN — Medication 10 MG: at 14:45

## 2018-08-19 RX ADMIN — Medication 5 MG: at 07:31

## 2018-08-19 RX ADMIN — Medication 10 ML: at 20:34

## 2018-08-19 RX ADMIN — Medication 5 MG: at 03:53

## 2018-08-19 RX ADMIN — LORAZEPAM 0.5 MG: 2 SOLUTION, CONCENTRATE ORAL at 00:10

## 2018-08-19 RX ADMIN — Medication 10 MG: at 20:34

## 2018-08-19 RX ADMIN — Medication 5 MG: at 11:07

## 2018-08-19 RX ADMIN — MORPHINE SULFATE 2 MG: 2 INJECTION, SOLUTION INTRAMUSCULAR; INTRAVENOUS at 05:21

## 2018-08-19 RX ADMIN — Medication 5 MG: at 00:10

## 2018-08-19 RX ADMIN — MORPHINE SULFATE 2 MG: 2 INJECTION, SOLUTION INTRAMUSCULAR; INTRAVENOUS at 01:59

## 2018-08-19 ASSESSMENT — PAIN SCALES - GENERAL
PAINLEVEL_OUTOF10: 9

## 2018-08-19 ASSESSMENT — PAIN DESCRIPTION - DESCRIPTORS: DESCRIPTORS: ACHING

## 2018-08-19 ASSESSMENT — PAIN DESCRIPTION - PAIN TYPE: TYPE: ACUTE PAIN

## 2018-08-19 ASSESSMENT — PAIN DESCRIPTION - LOCATION: LOCATION: BACK

## 2018-08-19 NOTE — PLAN OF CARE
Problem: Falls - Risk of:  Goal: Will remain free from falls  Will remain free from falls   Outcome: Ongoing  No fall this shift, bed alarm on and family at bedside. Door to room open. Problem: Pain:  Goal: Pain level will decrease  Pain level will decrease   Outcome: Ongoing  PRN oral morphine rate and dose adjusted today, patient continues to rate pain 9.5 out of 10. Problem: Risk for Impaired Skin Integrity  Goal: Tissue integrity - skin and mucous membranes  Structural intactness and normal physiological function of skin and  mucous membranes. Outcome: Ongoing  No new skin issues assessed. MEHREEN skin on back and buttocks, patient refusing complete turn to side to assess. Khan remains in place. Problem: Urinary Elimination:  Goal: Signs and symptoms of infection will decrease  Signs and symptoms of infection will decrease   Outcome: Ongoing  Remains in isolation for VRE    Problem: Infection - Central Venous Catheter-Associated Bloodstream Infection:  Goal: Will show no infection signs and symptoms  Will show no infection signs and symptoms   Outcome: Ongoing  Flushing PICC per protocol    Problem: Nutrition  Goal: Optimal nutrition therapy  Outcome: Ongoing  NPO, sips at times to help with dry mouth    Problem: Skin Integrity:  Goal: Absence of new skin breakdown  Absence of new skin breakdown   Outcome: Ongoing      Problem: Neurological  Goal: Maximum potential motor/sensory/cognitive function  Outcome: Ongoing      Problem: DISCHARGE BARRIERS  Goal: Patient's continuum of care needs are met  Outcome: Ongoing  [facility] .  hospice to re-evaluate patient tomorrow morning    Comments: Care plan reviewed with patient and family. Patient and family verbalize understanding of the plan of care and contribute to goal setting.

## 2018-08-19 NOTE — PROGRESS NOTES
paraplegia  With prolonged effect of anesthesia now with infected surgical site  Acute Resp failure extubated now  Anemia s/p PRBC 1 unit  Leucocytosis  Fluid retention  IRDM  Rhabdo  improved  Hypothyroidism  ED  COPD  Hypotension back on  pressors  Recurrent  Ileus s/p decompression colonoscopy  Epigastric pain now improved    Plan:    Increase morphine   Will check with hospice if pt qualifies for inpt hospice    Edie José MD

## 2018-08-20 PROCEDURE — 6370000000 HC RX 637 (ALT 250 FOR IP): Performed by: INTERNAL MEDICINE

## 2018-08-20 PROCEDURE — 2580000003 HC RX 258: Performed by: INTERNAL MEDICINE

## 2018-08-20 PROCEDURE — 1200000003 HC TELEMETRY R&B

## 2018-08-20 PROCEDURE — 1200000000 HC SEMI PRIVATE

## 2018-08-20 PROCEDURE — 2709999900 HC NON-CHARGEABLE SUPPLY

## 2018-08-20 PROCEDURE — 6360000002 HC RX W HCPCS: Performed by: INTERNAL MEDICINE

## 2018-08-20 RX ORDER — FENTANYL 12 UG/H
1 PATCH TRANSDERMAL
Status: DISCONTINUED | OUTPATIENT
Start: 2018-08-20 | End: 2018-08-22 | Stop reason: HOSPADM

## 2018-08-20 RX ADMIN — Medication 10 ML: at 21:36

## 2018-08-20 RX ADMIN — Medication 10 MG: at 20:38

## 2018-08-20 RX ADMIN — Medication 10 MG: at 05:57

## 2018-08-20 RX ADMIN — Medication 10 MG: at 10:33

## 2018-08-20 RX ADMIN — MORPHINE SULFATE 2 MG: 2 INJECTION, SOLUTION INTRAMUSCULAR; INTRAVENOUS at 09:02

## 2018-08-20 RX ADMIN — Medication 10 MG: at 14:46

## 2018-08-20 RX ADMIN — Medication 10 ML: at 09:04

## 2018-08-20 RX ADMIN — MORPHINE SULFATE 2 MG: 2 INJECTION, SOLUTION INTRAMUSCULAR; INTRAVENOUS at 17:17

## 2018-08-20 RX ADMIN — MORPHINE SULFATE 2 MG: 2 INJECTION, SOLUTION INTRAMUSCULAR; INTRAVENOUS at 12:15

## 2018-08-20 ASSESSMENT — PAIN SCALES - GENERAL
PAINLEVEL_OUTOF10: 9
PAINLEVEL_OUTOF10: 10
PAINLEVEL_OUTOF10: 9
PAINLEVEL_OUTOF10: 10
PAINLEVEL_OUTOF10: 9
PAINLEVEL_OUTOF10: 9
PAINLEVEL_OUTOF10: 10

## 2018-08-20 ASSESSMENT — PAIN DESCRIPTION - LOCATION: LOCATION: BACK

## 2018-08-20 ASSESSMENT — PAIN DESCRIPTION - PAIN TYPE: TYPE: ACUTE PAIN;CHRONIC PAIN

## 2018-08-20 NOTE — PROGRESS NOTES
IM Progress Note  Dr. Glenn Solano  8/20/2018 11:47 AM      Patient name Denia Huang  [id number]  PCP: Enoc Kerns MD  Admit Date: 8/1/2018  Acct No. [de-identified]    Subjective: Interval History:   Pt still requiring IV pain meds  D/w inpt hospice yesterday and today and will be re evaluated     Diet: Diet NPO Time Specified Exceptions are: Sips with Meds, Ice Chips    I/O last 3 completed shifts: In: 130 [P.O.:100; I.V.:30]  Out: 850 [Urine:850]  No intake/output data recorded. Admission weight: 279 lb (126.6 kg) as of 8/1/2018  5:04 PM  Wt Readings from Last 3 Encounters:   08/15/18 272 lb 0.8 oz (123.4 kg)     Body mass index is 37.94 kg/m². Medications:   Scheduled Meds:   sodium hypochlorite   Irrigation Daily    sodium chloride flush  10 mL Intravenous Q12H     Continuous Infusions:      Labs :     CBC:   No results for input(s): WBC, HGB, PLT in the last 72 hours. BMP:    No results for input(s): NA, K, CL, CO2, BUN, CREATININE, GLUCOSE in the last 72 hours. Hepatic:   No results for input(s): AST, ALT, ALB, BILITOT, ALKPHOS in the last 72 hours. Troponin: No results for input(s): TROPONINI in the last 72 hours. BNP: No results for input(s): BNP in the last 72 hours. Lipids:   No results for input(s): CHOL, HDL in the last 72 hours. Invalid input(s): LDLCALCU  INR:   No results for input(s): INR in the last 72 hours.     Radiology    Objective:   Vitals: /74   Pulse 113   Temp 98.7 °F (37.1 °C) (Oral)   Resp 16   Ht 5' 11\" (1.803 m)   Wt 272 lb 0.8 oz (123.4 kg)   SpO2 90%   BMI 37.94 kg/m²   HEENT: Head:pupils react  Neck: supple thick  Lungs: decreased bilat  Heart: regular  rhythm   Abdomen: distended increased BS sluggish   Extremities: warm +  Edema all 4 ext  Neurologic:  sleeping    Impression:   :   Severe Sepsis with Stap bactremia and Enterococcus in urine  S/p spinal surgery for metastatic prostate cancer to the spine with acute cord compression and paraplegia  With prolonged effect of anesthesia now with infected surgical site  Acute Resp failure extubated now  Anemia s/p PRBC 1 unit  Leucocytosis  Fluid retention  IRDM  Rhabdo  improved  Hypothyroidism  ED  COPD  Hypotension back on  pressors  Recurrent  Ileus s/p decompression colonoscopy  Epigastric pain now improved    Plan:    await inpt input otherwise transfer to [facility] with hospice    Natalie Lewis MD

## 2018-08-20 NOTE — PROGRESS NOTES
Called and spoke with patient's nurse Cristela. She reports patient continues to c/o pain in his back, rating 10/10. Has received 10 mg morphine po x2 this shift and one dose of iv; rates 9/10 at this time. Discussed alternating between oral morphine and IV morphine and that I would come and evaluate effectiveness in a few hours.

## 2018-08-20 NOTE — CARE COORDINATION
8/20/18, 7:24 AM    DISCHARGE BARRIERS        Patient transferred to [facility]. Report given to unit SW, Tatum FERRELL, regarding discharge plan for this patient.

## 2018-08-20 NOTE — CARE COORDINATION
8/20/18, 3:18 PM    DISCHARGE BARRIERS    Spoke with spouse re: discharge plans should patient not meet criteria for in-patient hospice. She would like [facility] as it is about a mile from her home. Discussed medicare coverage and ECF with hospice. Spouse asking about medicaid and actually had an appointment with [facility] this morning but cancelled it as she needed to be here to speak with hospice staff. She is receptive to speaking with someone from [address] [place] [address] about starting medicaid application. Call to Mark Arshad with [facility] and she will see patient today. Call to [address] [place] [address] is for patient to be assessed for in-patient status yet today.

## 2018-08-20 NOTE — PROGRESS NOTES
Patient and family have decided to transition to comfort measures and hospice care. Discussed with the patient and his wife. Oncology will sign off. Please call with questions or concerns.    Reviewed with Dr. René Black  Electronically signed by   Sage Beaver PA-C on 8/20/2018 at 10:44 AM

## 2018-08-21 PROCEDURE — 6370000000 HC RX 637 (ALT 250 FOR IP): Performed by: INTERNAL MEDICINE

## 2018-08-21 PROCEDURE — 2580000003 HC RX 258: Performed by: INTERNAL MEDICINE

## 2018-08-21 PROCEDURE — 1200000000 HC SEMI PRIVATE

## 2018-08-21 PROCEDURE — 6360000002 HC RX W HCPCS: Performed by: INTERNAL MEDICINE

## 2018-08-21 PROCEDURE — 2709999900 HC NON-CHARGEABLE SUPPLY

## 2018-08-21 RX ORDER — LORAZEPAM 2 MG/ML
0.5 CONCENTRATE ORAL EVERY 4 HOURS PRN
Qty: 30 ML | Refills: 0 | Status: SHIPPED | OUTPATIENT
Start: 2018-08-21 | End: 2018-09-04

## 2018-08-21 RX ORDER — ACETAMINOPHEN 650 MG/1
650 SUPPOSITORY RECTAL EVERY 4 HOURS PRN
Qty: 60 SUPPOSITORY | Refills: 3 | DISCHARGE
Start: 2018-08-21

## 2018-08-21 RX ORDER — FENTANYL 12 UG/H
1 PATCH TRANSDERMAL
Qty: 1 PATCH | Refills: 0 | Status: SHIPPED | OUTPATIENT
Start: 2018-08-23 | End: 2018-08-24

## 2018-08-21 RX ORDER — MORPHINE SULFATE 20 MG/ML
10 SOLUTION ORAL EVERY 4 HOURS PRN
Qty: 15 ML | Refills: 0 | Status: SHIPPED | OUTPATIENT
Start: 2018-08-21 | End: 2018-08-24

## 2018-08-21 RX ADMIN — Medication 10 ML: at 20:05

## 2018-08-21 RX ADMIN — MORPHINE SULFATE 2 MG: 2 INJECTION, SOLUTION INTRAMUSCULAR; INTRAVENOUS at 12:42

## 2018-08-21 RX ADMIN — Medication 10 ML: at 09:15

## 2018-08-21 RX ADMIN — Medication 10 MG: at 09:21

## 2018-08-21 RX ADMIN — Medication 10 MG: at 05:24

## 2018-08-21 RX ADMIN — Medication 10 MG: at 20:05

## 2018-08-21 RX ADMIN — Medication 10 MG: at 15:02

## 2018-08-21 RX ADMIN — MORPHINE SULFATE 2 MG: 2 INJECTION, SOLUTION INTRAMUSCULAR; INTRAVENOUS at 23:21

## 2018-08-21 RX ADMIN — DAKIN'S SOLUTION 0.125% (QUARTER STRENGTH): 0.12 SOLUTION at 09:00

## 2018-08-21 ASSESSMENT — PAIN SCALES - GENERAL
PAINLEVEL_OUTOF10: 0
PAINLEVEL_OUTOF10: 10
PAINLEVEL_OUTOF10: 10
PAINLEVEL_OUTOF10: 8
PAINLEVEL_OUTOF10: 10
PAINLEVEL_OUTOF10: 0
PAINLEVEL_OUTOF10: 0

## 2018-08-21 ASSESSMENT — PAIN DESCRIPTION - LOCATION: LOCATION: BACK

## 2018-08-21 ASSESSMENT — PAIN DESCRIPTION - FREQUENCY: FREQUENCY: CONTINUOUS

## 2018-08-21 ASSESSMENT — PAIN DESCRIPTION - DESCRIPTORS: DESCRIPTORS: CONSTANT

## 2018-08-21 ASSESSMENT — PAIN DESCRIPTION - PAIN TYPE: TYPE: ACUTE PAIN;CHRONIC PAIN

## 2018-08-21 NOTE — PROGRESS NOTES
Pt is currently being put on hospice care and no need for services at this time. We will sign off and see as needed.

## 2018-08-21 NOTE — PROGRESS NOTES
IM Progress Note  Dr. Glenn Solano  8/21/2018 10:44 AM      Patient name Denia Huang  [id number]  PCP: Enoc Kerns MD  Admit Date: 8/1/2018  Acct No. [de-identified]    Subjective: Interval History:   Pt well sedated  D/w wife at bedside    Diet: Diet NPO Time Specified Exceptions are: Sips with Meds, Ice Chips    I/O last 3 completed shifts: In: 200 [P.O.:200]  Out: 925 [Urine:925]  I/O this shift:  In: 20 [I.V.:20]  Out: -         Admission weight: 279 lb (126.6 kg) as of 8/1/2018  5:04 PM  Wt Readings from Last 3 Encounters:   08/15/18 272 lb 0.8 oz (123.4 kg)     Body mass index is 37.94 kg/m². Medications:   Scheduled Meds:   fentaNYL  1 patch Transdermal Q72H    sodium hypochlorite   Irrigation Daily    sodium chloride flush  10 mL Intravenous Q12H     Continuous Infusions:      Labs :     CBC:   No results for input(s): WBC, HGB, PLT in the last 72 hours. BMP:    No results for input(s): NA, K, CL, CO2, BUN, CREATININE, GLUCOSE in the last 72 hours. Hepatic:   No results for input(s): AST, ALT, ALB, BILITOT, ALKPHOS in the last 72 hours. Troponin: No results for input(s): TROPONINI in the last 72 hours. BNP: No results for input(s): BNP in the last 72 hours. Lipids:   No results for input(s): CHOL, HDL in the last 72 hours. Invalid input(s): LDLCALCU  INR:   No results for input(s): INR in the last 72 hours.     Radiology    Objective:   Vitals: BP (!) 141/77   Pulse 107   Temp 98.6 °F (37 °C) (Axillary)   Resp 16   Ht 5' 11\" (1.803 m)   Wt 272 lb 0.8 oz (123.4 kg)   SpO2 93%   BMI 37.94 kg/m²   HEENT: Head:pupils react  Neck: supple thick  Lungs: decreased bilat  Heart: regular  rhythm   Abdomen: distended increased BS sluggish   Extremities: warm +  Edema all 4 ext  Neurologic:  sleeping    Impression:   :   Severe Sepsis with Stap bactremia and Enterococcus in urine  S/p spinal surgery for metastatic prostate cancer to the spine with acute cord compression and

## 2018-08-21 NOTE — PROGRESS NOTES
Pt lying in bed. Opened eyes to nurse in room. Gave thumbs up when asked how pain doing. Asked if okay when he is at rest and he shook head yes. Primary nurse Mustapha Emanuel RN reports that patient able to rest peacefully in between doses of medication with eyes closed. Reviewed medication needs through out night after additions of Duragesic patch and amount minimal. Discussed with Dr. Quita Wright palliative care medical director. patient not meeting for [facility] hospice care. Plan for patient to transition to [facility] with hospice care.  [employer] working with family. Updated patient wife. Grateful for [facility] hospice assistance. Voiced that facility will be nice due to being only about 1 mile from home. [facility] hospice will remain available for questions/concerns, pt changes.

## 2018-08-22 VITALS
HEART RATE: 108 BPM | DIASTOLIC BLOOD PRESSURE: 74 MMHG | OXYGEN SATURATION: 93 % | RESPIRATION RATE: 20 BRPM | WEIGHT: 272.05 LBS | HEIGHT: 71 IN | BODY MASS INDEX: 38.09 KG/M2 | SYSTOLIC BLOOD PRESSURE: 127 MMHG | TEMPERATURE: 98.7 F

## 2018-08-22 PROCEDURE — 2580000003 HC RX 258: Performed by: INTERNAL MEDICINE

## 2018-08-22 PROCEDURE — 6370000000 HC RX 637 (ALT 250 FOR IP): Performed by: INTERNAL MEDICINE

## 2018-08-22 PROCEDURE — 6360000002 HC RX W HCPCS: Performed by: INTERNAL MEDICINE

## 2018-08-22 RX ADMIN — DAKIN'S SOLUTION 0.125% (QUARTER STRENGTH) 473 ML: 0.12 SOLUTION at 11:09

## 2018-08-22 RX ADMIN — Medication 10 ML: at 07:45

## 2018-08-22 RX ADMIN — Medication 10 MG: at 04:37

## 2018-08-22 RX ADMIN — Medication 10 MG: at 10:34

## 2018-08-22 RX ADMIN — LORAZEPAM 0.5 MG: 2 SOLUTION, CONCENTRATE ORAL at 11:06

## 2018-08-22 RX ADMIN — MORPHINE SULFATE 2 MG: 2 INJECTION, SOLUTION INTRAMUSCULAR; INTRAVENOUS at 01:12

## 2018-08-22 RX ADMIN — MORPHINE SULFATE 2 MG: 2 INJECTION, SOLUTION INTRAMUSCULAR; INTRAVENOUS at 07:45

## 2018-08-22 ASSESSMENT — PAIN SCALES - GENERAL
PAINLEVEL_OUTOF10: 10
PAINLEVEL_OUTOF10: 10
PAINLEVEL_OUTOF10: 0
PAINLEVEL_OUTOF10: 9
PAINLEVEL_OUTOF10: 9
PAINLEVEL_OUTOF10: 0
PAINLEVEL_OUTOF10: 9

## 2018-08-22 ASSESSMENT — PAIN DESCRIPTION - PAIN TYPE: TYPE: ACUTE PAIN;CHRONIC PAIN

## 2018-08-22 ASSESSMENT — PAIN DESCRIPTION - ORIENTATION: ORIENTATION: LOWER;UPPER

## 2018-08-22 ASSESSMENT — PAIN DESCRIPTION - LOCATION: LOCATION: BACK

## 2018-08-22 NOTE — CARE COORDINATION
8/22/18, 11:40 AM    DISCHARGE BARRIERS    Patient is to be discharged today to [facility]. He will be private pay with hospice care through [address]. Faisal Tse with facility notified of discharge as well as Romelia Aldridge with [facility]. AVS faxed to [facility] and hospice office. Transport request faxed to [facility]. Number provided to RN for report. 12:19 PM RN Mustapha Emanuel has advised that patient is in route to [facility]. She has advised staff at facility. Call to spouse [address] and advised her of discharge and call to [address] (Armand Mantilla) and advised her of discharge. No other needs at this time. 8/22/18, 12:21 PM    Discharge plan discussed by  and . Discharge plan reviewed with patient/ family. Patient/ family verbalize understanding of discharge plan and are in agreement with plan. Understanding was demonstrated using the teach back method.    Services After Discharge  Services At/After Discharge: Nursing Services, Hospice, Transport, In ambulance ([facility], [facility], [facility])   IMM Letter  IMM Letter given to Patient/Family/Significant other/Guardian/POA/by[de-identified] Updated  IMM Letter date given[de-identified] 08/22/18  IMM Letter time given[de-identified] [phone]

## 2018-08-22 NOTE — CARE COORDINATION
Discharge orders on chart. Met with [facility] and he is in agreement for discharge today, Medicare Rights updated.  Electronically signed by Gracie Ghosh RN on 8/22/18 at 11:44 AM

## 2018-08-22 NOTE — DISCHARGE SUMMARY
requiring. 8.  Hypothyroidism. 9.  COPD. 10.  Obstructive sleep apnea. 11.  Recurrent ileus. 12. Staph bacteremia and enterococcus in urine. MEDICATIONS:  To continue per hospice recommendations.         Srinivas Escalante M.D.    D: 08/21/2018 10:59:10       T: 08/21/2018 11:02:01     [email or web address]  Job#: [id number]     Doc#: [id number]    CC:

## 2018-10-23 LAB — MISC REFERENCE: NORMAL

## 2020-01-03 NOTE — PROGRESS NOTES
INSERTION OF T8 WITH POSTERIOR INSTRUMENTATION OF THORACIC SPINE AND TUMOR REMOVAL performed by Geoff Wallace MD at [address]         Restrictions/Precautions:  Fall Risk         Spinal Precautions: No Bending, No Lifting, No Twisting  Other position/activity restrictions: CHANDRIKA drain, 8/9 NG tube       Prior Level of Function:  ADL Assistance: Independent  Homemaking Assistance: Independent  Ambulation Assistance: Independent  Transfer Assistance: Independent  Additional Comments: Pt reports he was ambulating without AD PLOF. Pt reports pain, numbness, & unsteadiness with mobility at home since April 2018.      Subjective:     Subjective: pt in bed and agreeable for therapy he cont to be motivated and eager to get up, he cont to have extensor tone in LEs however much improved from previous date, he reported that he could feel feet on floor and lt touch at renae knees this date, with sitting edge of bed last couple of days he has been c/o of left buttock pain/burning sensation will cont to monitor     Pain:   .  Pain Assessment  Pain Level:  (no number given he c/o of min back pain with activity and left buttock pain/burning )       Social/Functional:  Lives With: Family (wife & 2 grandchildren (15 & 12 yo))  Type of Home: House  Home Layout: One level  Home Access: Stairs to enter with rails (3)  Home Equipment: Cane     Objective:  Rolling to Right: Maximum assistance (and to the left after he was depenent to get LEs into hooklying position he was able to reach across body for bedrail multi rolls for positioning of sheets under him prior to sitting edge of bed )  Supine to Sit: Maximum assistance (at trunk of 2 persons he has a hard time getting right UE under him as he is close to edge of bed and he was depenent to lower LEs off edge of bed )  Sit to Supine: Contact guard assistance (at trunk as he was able to lower to elbow down to shoulder and he was depenent at LEs to lift up into bed )  Scooting: Moderate assistance (pt was able to wt shift to the right and needed assist to scoot hip forward and same to the left, also completed side scoots to the right and left only scooting a couple of inches at a time and mod assist x 2 after scooting he wasn't able to reposition his LEs and was dependent to reposition)          Balance  Comments: pt sat edge of bed for 24 min wtih renae UEs at support he required CGA for balance and midline he did better at returning to midline this date but used a lot of UE vs trunk to return to midline, did free an UE to complete reaching activity he cont to require min to mod assist for balance when reaching, did place renae UEs on knees he was able to hold balance for > 1 min then lifted both UEs off knees and he was able to hold for ~ 30 sec before needing min assist, he also completed seated edge of bed alt UEs with shoulder flex/ext to 90 degrees with CGA to min assist and then he freed both UEs and completed shoulder horz abd/add x 5 reps with min assist for balance           Exercises:  Exercises  Comments: completed ex and range of motion renae LEs no movement noted at left LE this date with the exception of trace in adductors when he was completing right LE hip adduction, completed heelcord stretch able to stretch past neutral this date passive hip IR/ER stretch, and hip and knee flexion due to recent back sx discussed that we won't stretch knee to chest as he was asking for this, at left LE also passive hip abd/add, in hooklying position he wasn't able to hold left LE in midline this date and no trace contraction noted, also attempted quad sets and short arc quads with no contraction this date, at right LE completed passive hip and knee flex/ext no trace movement at hip flexors but occ in hamstring would feel trace, hp abd/add in straight plane he was dependent for abd but was able to complete add at least 1/2 the range, in hooklying position he was able to hold it in midline and completed small range of hip abd/add, and hip IR/ER at right LE some movement,quad sets did feel contraction and able to release contraction to command but unable to initiate movement wtih long or short arc quads        Activity Tolerance:  Activity Tolerance: Patient Tolerated treatment well    Assessment: Body structures, Functions, Activity limitations: Decreased functional mobility , Decreased balance, Decreased endurance, Decreased ROM, Decreased strength, Decreased coordination, Decreased sensation  Assessment: pt tolerated session well he cont to be very motivated throughout session, he cont to demonstrate decreased strength and sensation in LEs, noted improved siting balance this date and able to free UEs more with activity, and able to complete side scoots in prep for slide board transfes, he would benefit from cont skilled therap to work on strength, balance, tone management and increased independence with functional mobility   Prognosis: Good  REQUIRES PT FOLLOW UP: Yes  Discharge Recommendations: Continue to assess pending progress, Patient would benefit from continued therapy after discharge    Patient Education:  Patient Education: POC, balance and midline     Equipment Recommendations: Other: cont to assess needs    Safety:  Type of devices:  All fall risk precautions in place, Bed alarm in place, Call light within reach, Patient at risk for falls, Left in bed, Nurse notified    Plan:  Times per week: 5-6X N/O  Times per day: Daily  Specific instructions for Next Treatment: therex, bed mobility, sitting balance, PT to assess transfers  Current Treatment Recommendations: Strengthening, ROM, Functional Mobility Training, Balance Training, Endurance Training, Home Exercise Program, Safety Education & Training, Patient/Caregiver Education & Training, Pain Management, Equipment Evaluation, Education, & procurement, Neuromuscular Re-education    Goals:  Patient goals : less pain    Short term goals  Time Frame for Short term goals: 2 weeks  Short term goal 1: bed mobility with modAx2 to get in/out of bed  Short term goal 2: tolerate >10 min dyn sitting balance with 0-1 UE support and </=minAx1 to demonstrate inc trunk control for progression to transfers  Short term goal 3: PT to assess transfers    Long term goals  Time Frame for Long term goals : no LTGs set secondary to short ELOS Cephalexin Pregnancy And Lactation Text: This medication is Pregnancy Category B and considered safe during pregnancy.  It is also excreted in breast milk but can be used safely for shorter doses.

## 2021-11-23 NOTE — PROGRESS NOTES
This patient is not a [facility] hospice pt going home Saturday with another hospice. Spoke with patient wife, emotional support given. Discussed pain medications, patient had a dose or oral morphine this morning and seems to be well controlled at this time. No other questions or concerns at this time from wife. Re stated 24 hr hospice avilibility. Updated primary nurse Megan Bello. [Follow-Up] : a follow-up visit [Asthma] : asthma [Sleep Apnea] : sleep apnea [TextBox_44] : 2 weeks. Sleep study results. Pt has no pulmonary complaints and currently has a PFT kaila tcoming up.

## 2025-05-14 NOTE — CONSULTS
Left message to call back on patient's voicemail.  [employer] message sent   Physical Medicine & Rehabilitation   Consult Note      Admitting Physician: Renard Sumner MD    Primary Care Provider: Nikunj Bailon MD     Reason for Consult:  Acute T8 spinal cord compression due to metastasis and pathologic fracture. S/P surgical decompression on 8/3/18. Inpatient rehab. needs. History of Present Illness:  Karna Simmonds is a 61 y.o., [de-identified], white male admitted to [facility] on 8/1/2018. Patient initially presented to [facility] ED for worsening lumbar pain and bilateral leg weakness. He was discharged home that same day to follow up with his PCP. PCP ordered a bone scan which was performed on 7/25/18. This revealed spinal metastasis. He was scheduled with radiation oncology, whom recommended that he go to ED for further evaluation. Further imaging of his spine revealed multiple areas of metastasis including C7, multiple areas in thoracic and lumbar vertebrae. He had diffuse replacement of T8 by metastasis with an associated fracture causing mass effect upon the spinal cord. Neurosurgery and oncology were consulted. Patient was started on high dose steroids and taken to OR by Neurosurgery on 8/3/18. Dr Jazzmine Woods performed extensive procedure including tumor removal and repair. Patient remained intubated due to prolonged surgery. He was extubated and transferred to stepdown on 8/6/2018. Patient had some abdominal cramping with nausea and has not had a BM since admission. KUB was competed on 8/7/2018 which showed a possible ileus. He currently reported that his thoracic spine pain is at a 4 and his abdominal pain is at a 3 on a pain scale of 1-10 with 1 being no pain and 10 being intolerable pain. He noted no other acute trunk or limb pain. He also has numbness involving the Left greater than Right lower limb. He also reported numbness involving the Left lower anterior abdominal wall and Left inguinal region.       Current Rehabilitation Assessments:  Physical therapy: FIMS:  Bed Mobility: Scooting: Dependent/Total (x2 in sitting to edge of bed, )  FIMS:  , PT Equipment Recommendations  Other: cont to assess needs, Assessment: pt tolerated fair, pain in back and abdomen, muscle spasms and extensor tone BLE, dec balance, dec coordination and strength BLE/trunk, +2 for safe mobility, recommend cont PT to inc pt functional mobility    Occupational therapy: FIMS:   ,  , Assessment: Pt demo significant deficits in BLE, as well as tone & surgical pain. Continued OT recommended to educate Pt on compensatory strategies for ADLs & further assess safe t/f technique. Past Medical History:        Diagnosis Date    Anxiety     Arthritis     Asthma     Cancer ([facility]     prostate    COPD (chronic obstructive pulmonary disease) ([facility]     Depression     Diabetes mellitus (HCC)     GERD (gastroesophageal reflux disease)     Neuropathy     Pneumonia        Past Surgical History:        Procedure Laterality Date    CARPAL TUNNEL RELEASE Bilateral     CERVICAL DISCECTOMY      COLONOSCOPY      ENDOSCOPY, COLON, DIAGNOSTIC      EYE SURGERY      HERNIA REPAIR      LUMBAR SPINE SURGERY      NASAL SEPTUM SURGERY      ND OFFICE/OUTPT VISIT,PROCEDURE ONLY N/A 8/2/2018    TRANSPEDICULAR VERTEBRECTOMY AND CAGE INSERTION OF T8 WITH POSTERIOR INSTRUMENTATION OF THORACIC SPINE AND TUMOR REMOVAL performed by Jose Farrell MD at [facility] [address] [place] [address]         Allergies:     Allergies   Allergen Reactions    Toradol [Ketorolac Tromethamine] Other (See Comments)     Renal failure    Crestor [Rosuvastatin Calcium] Rash        Current Medications:   Current Facility-Administered Medications: furosemide (LASIX) injection 10 mg, 10 mg, Intravenous, Q8H  pantoprazole (PROTONIX) injection 40 mg, 40 mg, Intravenous, Daily  cyclobenzaprine (FLEXERIL) tablet 5 mg, 5 mg, Oral, TID PRN  Dexamethasone Sodium Phosphate injection 3 mg, 3 mg, Intravenous, Q8H  [START ON 8/8/2018] gabapentin (NEURONTIN) tablet 1,800 mg, 1,800 mg, Oral, Daily  gabapentin (NEURONTIN) tablet 1,200 mg, 1,200 mg, Oral, BID  bisacodyl (DULCOLAX) suppository 10 mg, 10 mg, Rectal, Daily  oxyCODONE-acetaminophen (PERCOCET) 5-325 MG per tablet 1 tablet, 1 tablet, Oral, Q6H PRN  senna (SENOKOT) tablet 8.6 mg, 1 tablet, Oral, Nightly  docusate sodium (COLACE) capsule 100 mg, 100 mg, Oral, Daily  polyethylene glycol (GLYCOLAX) packet 17 g, 17 g, Oral, Daily  insulin glargine (LANTUS) injection vial 30 Units, 30 Units, Subcutaneous, Daily  methylnaltrexone (RELISTOR) injection 20 mg, 0.15 mg/kg, Subcutaneous, Q48H  insulin lispro (HUMALOG) injection vial 0-6 Units, 0-6 Units, Subcutaneous, TID WC  insulin lispro (HUMALOG) injection vial 0-3 Units, 0-3 Units, Subcutaneous, Nightly  insulin regular (HUMULIN R;NOVOLIN R) injection 0-10 Units, 0-10 Units, Intravenous, PRN  dextrose 50 % solution 12.5 g, 12.5 g, Intravenous, PRN  fentaNYL (SUBLIMAZE) injection 50 mcg, 50 mcg, Intravenous, Q1H PRN  ipratropium-albuterol (DUONEB) nebulizer solution 1 ampule, 1 ampule, Inhalation, Q4H WA  calcium replacement protocol, , Other, RX Placeholder  potassium (CARDIAC) replacement protocol, , Other, RX Placeholder  magnesium replacement protocol, , Other, RX Placeholder  DULoxetine (CYMBALTA) extended release capsule 60 mg, 60 mg, Oral, Daily  ondansetron (ZOFRAN) injection 4 mg, 4 mg, Intravenous, Q6H PRN  glucose (GLUTOSE) 40 % oral gel 15 g, 15 g, Oral, PRN  dextrose 50 % solution 12.5 g, 12.5 g, Intravenous, PRN  glucagon (rDNA) injection 1 mg, 1 mg, Intramuscular, PRN  dextrose 5 % solution, 100 mL/hr, Intravenous, PRN  nystatin (MYCOSTATIN) 584627 UNIT/ML suspension 500,000 Units, 5 mL, Oral, 4x Daily  bicalutamide (CASODEX) chemo tablet 50 mg, 50 mg, Oral, Daily  Fluticasone Furoate-Vilanterol 200-25 MCG/INH AEPB 1 applicator, 1 applicator, Inhalation, Daily  HYDROcodone-acetaminophen (NORCO)  MG per tablet 1 intubation, extubated 8/5/18  · GERD  · Asthma  · Arthritis  · Anxiety  · Depression  · Gait dysfunction  · Deficits with his A.D.L.'s.    Recommendations:  · Continue current inpatient rehab. therapies. · Bilateral foot drop boots have been ordered. · Dr. Ludy Mendes to evaluate today. It was my pleasure to evaluate Justin Rodríguez today. Please call with questions. Gisselle Marinelli, APRN - CNP       Review of Systems:  CONSTITUTIONAL:  negative  EYES:  negative  HEENT:  negative  RESPIRATORY:  positive for  dyspnea and the patient currently requires 3 liters of Oxygen per nasal canula. CARDIOVASCULAR:  negative  GASTROINTESTINAL:  positive for nausea, change in bowel habits, abdominal pain and abdominal distention  GENITOURINARY:  negative  SKIN:  negative  HEMATOLOGIC/LYMPHATIC:  positive for post-surgical anemia. MUSCULOSKELETAL:  positive for  myalgias, arthralgias, pain, stiff joints, decreased range of motion, muscle weakness and bone pain  NEUROLOGICAL:  positive for coordination problems, gait problems, numbness, pain and extensive extensor tone involving both lower limbs throughout. BEHAVIOR/PSYCH:  positive for depressed mood  System review otherwise negative    Physical Exam:  /74   Pulse 85   Temp 98.8 °F (37.1 °C) (Oral)   Resp 16   Ht 5' 11\" (1.803 m)   Wt 299 lb 1 oz (135.7 kg)   SpO2 95%   BMI 41.71 kg/m²   He was noted to be awake, alert, and in moderate acute distress due to his respiratory and abdominal status.   Orientation:   person, place, time  Mood: labile  Affect: depressed, labile and calm  General appearance: Patient is well nourished, well developed, well groomed and in no acute distress, obese, appearing stated age, moderate distress, flattened affect    Memory:   normal,   Attention/Concentration: normal  Language:  normal    Cranial Nerves:  cranial nerves II-XII are grossly intact  ROM:  abnormal - with respect to his thoracic and lumbosacral spine and discussed. I agree with above documentation.       eJnny Wiggins MD

## (undated) DEVICE — TUBING PRSS 36 M F

## (undated) DEVICE — PREP SOL PVP IODINE 4%  4 OZ/BTL

## (undated) DEVICE — ULTRACLEAN ACCESSORY ELECTRODE 6" (15.24 CM) COATED BLADE: Brand: ULTRACLEAN

## (undated) DEVICE — BANDAGE ADH W1XL3IN NAT FAB WVN FLX DURABLE N ADH PD SEAL

## (undated) DEVICE — CARBIDE MATCH HEAD

## (undated) DEVICE — CONMED DISPOSABLE BIPOLAR CABLE, 10' (3.05M): Brand: CONMED

## (undated) DEVICE — DRAIN SURG FLAT W7MMXL20CM FULL PERF

## (undated) DEVICE — SPLINT ARMBRD W3XL10.5IN POLYFOAM DLX A LN

## (undated) DEVICE — SET CATH 20GA L1.75IN RAD ART POLYUR RADPQ W/ INTEGR

## (undated) DEVICE — WILSON FRAME STYLE POSITIONING KITS - 	FRAME PAD SLEEVES (SET OF 2) , DRAPE PROTECTOR, BAR PROTECTOR 7" COMFORT FOAM HEADREST, LAMINECTOMY ARM CRADLES: Brand: SOULE MEDICAL

## (undated) DEVICE — GLOVE SURG SZ 65 THK91MIL LTX FREE SYN POLYISOPRENE

## (undated) DEVICE — SPONGE LAP W18XL18IN WHT COT 4 PLY FLD STRUNG RADPQ DISP ST

## (undated) DEVICE — SUTURE VCRL SZ 0 L45CM ABSRB VLT OS-6 L36.4MM 1/2 CIR REV J711T

## (undated) DEVICE — BASIC SINGLE BASIN BTC-LF: Brand: MEDLINE INDUSTRIES, INC.

## (undated) DEVICE — AGENT HEMSTAT W2XL14IN OXIDIZED REGENERATED CELOS ABSRB FOR

## (undated) DEVICE — GOWN,SIRUS,NONRNF,SETINSLV,XL,20/CS: Brand: MEDLINE

## (undated) DEVICE — 500ML,PRESSURE INFUSER W/STOPCOCK: Brand: MEDLINE

## (undated) DEVICE — GLOVE SURG SZ 6 THK91MIL LTX FREE SYN POLYISOPRENE ANTI

## (undated) DEVICE — INTENDED FOR TISSUE SEPARATION, AND OTHER PROCEDURES THAT REQUIRE A SHARP SURGICAL BLADE TO PUNCTURE OR CUT.: Brand: BARD-PARKER ® CARBON RIB-BACK BLADES

## (undated) DEVICE — ROYAL SILK SURGICAL GOWN, XXL: Brand: CONVERTORS

## (undated) DEVICE — NEEDLE SPNL L3.5IN PNK HUB S STL REG WALL FIT STYL W/ QNCKE

## (undated) DEVICE — GAUZE,SPONGE,8"X4",12PLY,XRAY,STRL,LF: Brand: MEDLINE

## (undated) DEVICE — LINER SUCT CANSTR 1500CC SEMI RIG W/ POR HYDROPHOBIC SHUT

## (undated) DEVICE — APPLIER LIG CLP M L11IN TI STR RNG HNDL FOR 20 CLP DISP

## (undated) DEVICE — DRAPE C ARM W36XL30IN RECTANG BND BG AND TAPE

## (undated) DEVICE — BLADE LARYNSCP SZ 4 ENH DIR INTUB GLIDESCOPE MCGRATH MAC

## (undated) DEVICE — CODMAN® SURGICAL PATTIES 1/2" X 1/2" (1.27CM X 1.27CM): Brand: CODMAN®

## (undated) DEVICE — SUTURE PERMA-HAND SZ 3-0 L30IN NONABSORBABLE BLK L60MM KS 622H

## (undated) DEVICE — SUTURE ABSRB L45CM L36MM SZ 2-0 OS-6 VLT POLYGLACTIN 910 J710T

## (undated) DEVICE — BIPOLAR SEALER 23-112-1 AQM 6.0: Brand: AQUAMANTYS ®

## (undated) DEVICE — Z DISCONTINUED BY MEDLINE USE 2711682 TRAY SKIN PREP DRY W/ PREM GLV

## (undated) DEVICE — 4-PORT MANIFOLD: Brand: NEPTUNE 2

## (undated) DEVICE — PRESSURE MONITORING SET: Brand: TRUWAVE

## (undated) DEVICE — TUBING, SUCTION, 1/4" X 20', STRAIGHT: Brand: MEDLINE INDUSTRIES, INC.

## (undated) DEVICE — Device

## (undated) DEVICE — SELF DRILLING SCREW
Type: IMPLANTABLE DEVICE | Status: NON-FUNCTIONAL
Brand: ESCALATE
Removed: 2018-08-02

## (undated) DEVICE — STERILE-Z SURGICAL PATIENT COVERS CLEAR POLYETHYLENE STERILE UNIVERSAL FIT 10 PER CASE: Brand: STERILE-Z

## (undated) DEVICE — DIFFUSER: Brand: CORE, MAESTRO

## (undated) DEVICE — SOLUTION IV IRRIG WATER 1000ML POUR BRL 2F7114

## (undated) DEVICE — SOLUTION IV 1000ML 0.9% SOD CHL PH 5 INJ USP VIAFLX PLAS

## (undated) DEVICE — PROBE STIM 3 MM FOR PEDCL SCREW DISP

## (undated) DEVICE — GARMENT,MEDLINE,DVT,INT,CALF,MED, GEN2: Brand: MEDLINE

## (undated) DEVICE — 3M™ BAIR HUGGER® MULTI ACCESS BLANKET, PEDIATRIC, FULL BODY, 10 PER CASE 31000: Brand: BAIR HUGGER™

## (undated) DEVICE — YANKAUER,BULB TIP,W/O VENT,RIGID,STERILE: Brand: MEDLINE

## (undated) DEVICE — Z CONVERTED USE 2715898 SWABSTICK MEDICATED W1.75XL6.5IN 1.6ML 3.15% CHG 70% ISO

## (undated) DEVICE — SUREFIT, DUAL DISPERSIVE ELECTRODE, CONTACT QUALITY MONITOR: Brand: SUREFIT

## (undated) DEVICE — SYRINGE MED 50ML LUERLOCK TIP

## (undated) DEVICE — GLOVE ORANGE PI 8   MSG9080

## (undated) DEVICE — 3M™ STERI-DRAPE™ INSTRUMENT POUCH 1018: Brand: STERI-DRAPE™

## (undated) DEVICE — RESERVOIR,SUCTION,100CC,SILICONE: Brand: MEDLINE

## (undated) DEVICE — 3M™ IOBAN™ 2 ANTIMICROBIAL INCISE DRAPE 6650EZ: Brand: IOBAN™ 2

## (undated) DEVICE — SUTURE NRLN SZ 4-0 L18IN NONABSORBABLE BLK L17MM RB-1 1/2 C554D

## (undated) DEVICE — SOLUTION IV 500ML 0.9% SOD CHL PH 5 INJ USP VIAFLX PLAS

## (undated) DEVICE — 1010 S-DRAPE TOWEL DRAPE 10/BX: Brand: STERI-DRAPE™

## (undated) DEVICE — SPONGE GZ W4XL4IN COT 12 PLY TYP VII WVN C FLD DSGN

## (undated) DEVICE — SYRINGE IRRIG 60ML SFT PLIABLE BLB EZ TO GRP 1 HND USE W/

## (undated) DEVICE — COVER XR CASS W21XL40IN UNIV ADH MICROSHIELD

## (undated) DEVICE — SPONGE DRN W4XL4IN RAYON/POLYESTER 6 PLY NONWOVEN PRECUT

## (undated) DEVICE — PACK PROCEDURE SURG ORTH BASIC SRHP LF

## (undated) DEVICE — CODMAN® SURGICAL PATTIES 1/2" X1 1/2" (1.27CM X 3.81CM): Brand: CODMAN®

## (undated) DEVICE — MARKER,SKIN,WI/RULER AND LABELS: Brand: MEDLINE

## (undated) DEVICE — C-ARMOR C-ARM EQUIPMENT COVERS CLEAR STERILE UNIVERSAL FIT 12 PER CASE: Brand: C-ARMOR

## (undated) DEVICE — GLOVE ORANGE PI 8 1/2   MSG9085

## (undated) DEVICE — TOTAL TRAY, DB, 100% SILI FOLEY, 16FR 10: Brand: MEDLINE

## (undated) DEVICE — DRESSING GRMCDL 6 12FR D1N CNTR HOLE 4MM ANTMCRBL PRTCTVE DI

## (undated) DEVICE — SOLUTION IV 1000ML LAC RINGERS PH 6.5 INJ USP VIAFLX PLAS

## (undated) DEVICE — GLOVE ORANGE PI 7 1/2   MSG9075

## (undated) DEVICE — AGENT HEMSTAT W4XL8IN OXIDIZED REGENERATED CELOS ABSRB

## (undated) DEVICE — OIL CARTRIDGE: Brand: CORE, MAESTRO

## (undated) DEVICE — DRAIN SURG 10FR PVC TB W/ TRCR MID PERF NO RESVR HUBLESS

## (undated) DEVICE — CODMAN® SURGICAL PATTIES 1/4" X 1/4" (0.64CM X 0.64CM): Brand: CODMAN®

## (undated) DEVICE — SET CATH 20GA L1.5IN RAD ART POLYUR RADPQ W/ INTEGR 0.018IN

## (undated) DEVICE — DRESSING TRNSPAR W5XL4.5IN FLM SHT SEMIPERMEABLE WIND

## (undated) DEVICE — ZEISS MD MICROSCOPE DRAPE 54 X 120 - GLASS LENS: Brand: OPTICS ONE

## (undated) DEVICE — GOWN,SIRUS,NON REINFRCD,LARGE,SET IN SL: Brand: MEDLINE

## (undated) DEVICE — GLOVE ORANGE PI 7   MSG9070

## (undated) DEVICE — 35 ML SYRINGE LUER-LOCK TIP: Brand: MONOJECT

## (undated) DEVICE — WAX SURG 2.5GM HEMSTAT BNE BEESWAX PARAFFIN ISO PALMITATE